# Patient Record
Sex: MALE | Race: WHITE | NOT HISPANIC OR LATINO | Employment: OTHER | URBAN - METROPOLITAN AREA
[De-identification: names, ages, dates, MRNs, and addresses within clinical notes are randomized per-mention and may not be internally consistent; named-entity substitution may affect disease eponyms.]

---

## 2017-02-09 ENCOUNTER — HOSPITAL ENCOUNTER (OUTPATIENT)
Facility: CLINIC | Age: 62
Discharge: HOME | End: 2017-02-09
Attending: INTERNAL MEDICINE | Admitting: INTERNAL MEDICINE
Payer: COMMERCIAL

## 2017-02-09 VITALS
HEIGHT: 72 IN | SYSTOLIC BLOOD PRESSURE: 120 MMHG | RESPIRATION RATE: 12 BRPM | BODY MASS INDEX: 29.12 KG/M2 | OXYGEN SATURATION: 92 % | WEIGHT: 215 LBS | DIASTOLIC BLOOD PRESSURE: 78 MMHG

## 2017-02-09 LAB — COLONOSCOPY: NORMAL

## 2017-02-09 PROCEDURE — G0500 MOD SEDAT ENDO SERVICE >5YRS: HCPCS | Performed by: INTERNAL MEDICINE

## 2017-02-09 PROCEDURE — 45378 DIAGNOSTIC COLONOSCOPY: CPT | Performed by: INTERNAL MEDICINE

## 2017-02-09 PROCEDURE — G0121 COLON CA SCRN NOT HI RSK IND: HCPCS | Performed by: INTERNAL MEDICINE

## 2017-02-09 PROCEDURE — 25000128 H RX IP 250 OP 636: Performed by: INTERNAL MEDICINE

## 2017-02-09 PROCEDURE — 25000125 ZZHC RX 250: Performed by: INTERNAL MEDICINE

## 2017-02-09 RX ORDER — FENTANYL CITRATE 50 UG/ML
INJECTION, SOLUTION INTRAMUSCULAR; INTRAVENOUS PRN
Status: DISCONTINUED | OUTPATIENT
Start: 2017-02-09 | End: 2017-02-09 | Stop reason: HOSPADM

## 2017-02-09 RX ORDER — ONDANSETRON 4 MG/1
4 TABLET, ORALLY DISINTEGRATING ORAL EVERY 6 HOURS PRN
Status: DISCONTINUED | OUTPATIENT
Start: 2017-02-09 | End: 2017-02-09 | Stop reason: HOSPADM

## 2017-02-09 RX ORDER — ONDANSETRON 2 MG/ML
4 INJECTION INTRAMUSCULAR; INTRAVENOUS
Status: DISCONTINUED | OUTPATIENT
Start: 2017-02-09 | End: 2017-02-09 | Stop reason: HOSPADM

## 2017-02-09 RX ORDER — NALOXONE HYDROCHLORIDE 0.4 MG/ML
.1-.4 INJECTION, SOLUTION INTRAMUSCULAR; INTRAVENOUS; SUBCUTANEOUS
Status: DISCONTINUED | OUTPATIENT
Start: 2017-02-09 | End: 2017-02-09 | Stop reason: HOSPADM

## 2017-02-09 RX ORDER — ONDANSETRON 2 MG/ML
4 INJECTION INTRAMUSCULAR; INTRAVENOUS EVERY 6 HOURS PRN
Status: DISCONTINUED | OUTPATIENT
Start: 2017-02-09 | End: 2017-02-09 | Stop reason: HOSPADM

## 2017-02-09 RX ORDER — FLUMAZENIL 0.1 MG/ML
0.2 INJECTION, SOLUTION INTRAVENOUS
Status: DISCONTINUED | OUTPATIENT
Start: 2017-02-09 | End: 2017-02-09 | Stop reason: HOSPADM

## 2017-02-09 RX ORDER — LIDOCAINE 40 MG/G
CREAM TOPICAL
Status: DISCONTINUED | OUTPATIENT
Start: 2017-02-09 | End: 2017-02-09 | Stop reason: HOSPADM

## 2017-02-09 NOTE — H&P
Pre-Endoscopy History and Physical     Lev Cabral MRN# [id number]   YOB: 1955 Age: 61 year old     Date of Procedure: 2/9/2017  Primary care provider: Анна Gonzáles  Type of Endoscopy: Colonoscopy with possible biopsy, possible polypectomy  Reason for Procedure: screen  Type of Anesthesia Anticipated: Conscious Sedation    HPI:    eLv is a 61 year old male who will be undergoing the above procedure.      A history and physical has been performed. The patient's medications and allergies have been reviewed. The risks and benefits of the procedure and the sedation options and risks were discussed with the patient.  All questions were answered and informed consent was obtained.      He denies a personal or family history of anesthesia complications or bleeding disorders.     Patient Active Problem List   Diagnosis     CARDIOVASCULAR SCREENING; LDL GOAL LESS THAN 130     Family history of diabetes mellitus     Advanced directives, counseling/discussion     Cervicalgia     Acute reaction to stress        Past Medical History   Diagnosis Date     Unspecified adjustment reaction         Past Surgical History   Procedure Laterality Date     Appendectomy       Ent surgery       tonsils     Orthopedic surgery  1999     neck fusion- 3 level     Orthopedic surgery Left      left knee arthroscopic     Colonoscopy  2009     Dr. Oliva       Social History   Substance Use Topics     Smoking status: Never Smoker      Smokeless tobacco: Never Used     Alcohol Use: No       Family History   Problem Relation Age of Onset     CANCER Father      brain tumor     DIABETES Father      DIABETES Mother      Type 2     Colon Cancer No family hx of        Prior to Admission medications    Medication Sig Start Date End Date Taking? Authorizing Provider   Cholecalciferol (VITAMIN D) 1000 UNITS capsule Take 2 capsules by mouth daily   Yes Reported, Patient   azithromycin (ZITHROMAX) 250 MG tablet Two tablets first  day, then one tablet daily for four days. 3/30/16   Francisca Lopez PA-C   guaiFENesin-codeine (ROBITUSSIN AC) 100-10 MG/5ML SOLN Take 10 mLs by mouth nightly as needed for cough 3/30/16   Francisca Lopez PA-C   benzonatate (TESSALON) 200 MG capsule Take 1 capsule (200 mg) by mouth 3 times daily as needed for cough 3/30/16   Francisca Lopez PA-C       Allergies   Allergen Reactions     No Known Drug Allergies         REVIEW OF SYSTEMS:   5 point ROS negative except as noted above in HPI, including Gen., Resp., CV, GI &  system review.    PHYSICAL EXAM:   Ht 1.829 m (6')  Wt 97.523 kg (215 lb)  BMI 29.15 kg/m2 Estimated body mass index is 29.15 kg/(m^2) as calculated from the following:    Height as of this encounter: 1.829 m (6').    Weight as of this encounter: 97.523 kg (215 lb).   GENERAL APPEARANCE: alert, and oriented  MENTAL STATUS: alert  AIRWAY EXAM: Mallampatti Class I (visualization of the soft palate, fauces, uvula, anterior and posterior pillars)  RESP: lungs clear to auscultation - no rales, rhonchi or wheezes  CV: regular rates and rhythm  DIAGNOSTICS:    Not indicated    IMPRESSION   ASA Class 2 - Mild systemic disease    PLAN:   Plan for Colonoscopy with possible biopsy, possible polypectomy. We discussed the risks, benefits and alternatives and the patient wished to proceed.    The above has been forwarded to the consulting provider.      Signed Electronically by: Amos Loving  February 9, 2017

## 2017-02-09 NOTE — LETTER
January 25, 2017      Lev Cabral  [address] [place] [address]              Dear Lev Cabral,    Thank you for choosing [facility]. You are scheduled for the following service(s).   Miralax Prep    Procedure:   Colonoscopy   Provider:         Dr. Loving  Date:    Thursday February 9, 2017                Arrival Time:   0800  *check in at Emergency/Endoscopy desk*  Procedure Time:  0830     Location:   [facility]      Endoscopy Department, First Floor (Enter through ER Doors) *       [address] [place], [place] [address]    [phone] or [phone] () to reschedule   What is a colonoscopy?  A colonoscopy is the most accurate test to detect colon polyps and colon cancer, and the only test where polyps can be removed. During this procedure, a doctor examines the lining of your large intestine and rectum through a flexible tube called a colonoscope.   To produce the best and most accurate results, your colon must be completely clean. You will drink a special bowel cleansing preparation to help clean out your colon. You will also need to follow a special diet several days prior to your scheduled colonoscopy.  What happens during a colonoscopy?  Plan to spend up to two hours, starting at registration time, at the endoscopy center the day of your procedure. The exam itself takes approximately 15 minutes to complete, and recovery is approximately 30 minutes.     Before the exam:    You will change into a gown.    Your medical history will be reviewed with you and you will be given a consent form to sign.     A nurse will insert an intravenous (IV) line into your hand or arm.  During the exam:     Medicine will be given through the IV line to help you relax and feel drowsy.     Your heart rate and oxygen levels will be monitored. If your blood pressure is low, you may be given fluids through the IV line.     The doctor will  insert a flexible hollow tube, called a colonoscope, into your rectum.   The scope will be advanced slowly through the large intestine (colon).    You may have a feeling of pressure or fullness.     If an abnormal tissue, or a polyp are found, the doctor may remove it through the endoscope for closer examination, or biopsy. Tissue removal is painless.  What happens after the exam?           The doctor will talk with you about the initial results of your exam.     The doctor will prepare a full report for the physician who referred you for the colonoscopy.     You may feel bloated after the procedure. This is normal.     Medication given during the exam will prohibit you from driving for the rest of the day.     Following the exam, you may resume your normal diet. Avoid alcohol until the next day.     You may resume your regular activities the day after the procedure.     A nurse will provide you with complete discharge instructions before you leave the endoscopy center. Be sure to ask the nurse for specific instructions if you take blood thinners such as aspirin, Coumadin or Plavix.     Any tissue samples removed during the exam will be sent to a lab for evaluation. It may take 5-7 working days for you to be notified of the results.     Miralax-Gatorade  If you have diabetes, ask your regular doctor for diet and medication restrictions.   If you take a medication to thin your blood, such as coumadin or warfarin, please call your primary care provider for directions on when to stop this medication.  If you take aspirin, you may continue to do so.  If you are or may be pregnant, please discuss the risks and benefits of this procedure with your doctor.  You must arrange for a ride for the day of your exam. If you fail to arrange transportation with a responsible adult (someone you know and trust) your procedure will need to be cancelled and rescheduled.  If you must cancel or reschedule your appointment, please call  [phone] as soon as possible.        PREPARATION  To ensure a successful exam, please follow all instructions carefully. Failure to accurately and completely prepare for your exam may result in the need for an additional procedure and both procedures will be billed to your insurance.     Purchase the following over-the-counter supplies at your local pharmacy:      ? 2 tablets bisacodyl, each containing 5 mg of bisacodyl (Dulcolax  laxative NOT Dulcolax  stool softener)   ? 1-8.3 oz. bottle Miralax  powder (238 grams)   ? 64 oz. Gatorade  liquid (NOT red; NOT powdered). Regular Gatorade , Gatorade G2 , Powerade  or  PoweradeZero  are acceptable.   ? 1-10 oz. bottle Magnesium Citrate (NOT red)  7 days before your exam:  Discontinue fiber supplements or medications containing iron. This includes multivitamins with iron, Metamucil  and Fibercon .    3 Days prior to your exam:  Stop eating all high-fiber foods and begin a Low-Fiber Diet. A low fiber diet helps make the cleanout more effective.     Examples of a Low Fiber Diet include:     White bread, white rice, pasta, potato without skin, plain crackers     Fish, white meat chicken, eggs, peanut butter without nuts     Clear beverages (apple juice, white grape juice, Sprite , sparkling water, Gatorade )     Cooked carrots, cooked green beans, cooked spinach        Milk, plain yogurt, cheese     Jelly, salt, pepper, sugar  Avoid: Raw fruits or vegetables, whole wheat or high fiber foods, seeds, nuts, popcorn, bran or bulking agents     2 days prior to your exam     Continue Low Fiber Diet.     Drink at least 8 (eight ounces) glasses of water throughout the day.     Refrigerate the Gatorade  or Powerade , if you wish to drink it cold.     Stop eating solid foods at 11:45 pm.    1 day prior to your exam  Start a Clear Liquid Diet   Examples of a Clear Liquid Diet:     No red liquids; No coffee; No alcohol; No dairy products     May drink clear caffeinated  beverages    Water: drink at least 8 glasses of water during the day     Tea (do not add milk or creamer)     Clear broth or bouillon     Gatorade , Pedialyte  or Powerade  (No red)     Carbonated and non-carbonated soft drinks (Sprite , 7-Up , Ginger ale)     Strained fruit juices without pulp (apple, white grape, white cranberry)     Jell-O , popsicles, hard candy (No red)    At 12 Noon:  Take the 2 bisacodyl (Dulcolax ) tablets    At 4 PM (no later than 6 PM)    Mix 1 bottle of Miralax  (8.3 oz) with 64 oz. of Gatorade  in a large pitcher.     Drink 1 - 8 oz. glass of the Miralax /Gatorade  solution.     Continue drinking 1 - 8 oz. glass every 15 minutes thereafter until the mixture is gone.     Continue clear liquid diet     Colon Cleansing Tips     If you experience nausea or vomiting while taking the prep, rinse your mouth with water,  or mouthwash , take a 15-30 minute break, and then continue taking the prep solution. If you are still unable to complete the prep, without severe nausea or vomiting, you will need to contact your primary care provider (the provider who ordered the test) for a possible anti-nausea medication. Or if you are prone to nausea you may want to ask your primary care provider to prescribe an as needed anti-nausea medication that you may have on hand.    Chill the Miralax /Gatorade  solution in the refrigerator. DO NOT add ice to the solution or your drinking glass.      Set a timer for every 15 minutes. Drink each 8 - oz. glass of solution quickly to help flush your colon.     Stay near a toilet! You will have diarrhea.     Even if you are sitting on the toilet, continue to drink the cleansing solution every 15 minutes.     Drink all of the solution until it is gone.     You will be uncomfortable until the stool has flushed from your colon (in about 2-4 hours). You may feel chilled.     You may suck on a few hard candies (NO red).     Alcohol-free baby wipes or Vaseline  may help ease  skin irritation.     Over-the-counter hydrocortisone creams, hemorrhoid treatments or Tucks may be used if desired.    The day of your exam:            Continue clear liquid diet. Do not eat solid foods.     You may take all of your morning medications.    4 hours before your procedure:     Drink 10 oz. of Magnesium Citrate.    2 hours before your procedure:     Stop drinking clear liquids.     Allow extra time to travel to your procedure as you may need to stop and use a restroom along the way.  You are ready for the exam, if you followed all instructions and your stool is no longer formed, but clear or yellow liquid. If you are unsure whether your colon is clean, please call our department at [phone] before you leave for your appointment.      Bring a list of all of your current medications, including any allergy or over-the-counter medications.      Bring a photo ID, as well as up-to-date insurance information, such as your insurance card and any referral forms that might be required by your insurance company.  DIRECTIONS  From the north ([facility], [place])  Take [address] south, exit to [address] [place] [address]. Get into the left hand channing, turn left (east), go one-half mile to [address]. Turn left (north) on [address]. Go north to first stoplight, take a right on the newly constructed [address] and follow it to the Emergency entrance.  From the south ([facility] [place] [facility])  Take 35 north to the east split, [address], and exit to [address] [place] [address]. Turn left (west) on [address] [address] to [address]. Turn right (north) on [address]. Go north to first stoplight, take a right on the newly [address] and follow it to the Emergency entrance.    From the east via 35E ([facility])  Take [address]E south to [address] [address] exit. Turn right on [address] [address]. Go west to [address]. Turn right (north) on [address], go to the first stoplight, take a right and  follow the newly constructed road, [place], to the Emergency entrance.    From the east via [address] ([facility])  Take [address] west to [address]. Turn left (south) on [address] to [address]. Turn left (east) on [address] and follow the newly constructed road to the Emergency entrance.    From the west via [address] ([place])  Take [address] east to [address]. Turn right (south) on [address] to [address].  Turn left (east) on [address] and follow the newly constructed road to the Emergency entrance.  Cut along line  -------------------------------------------------------------------------------------------------------------------  SHOPPING LIST FOR OVER-THE-COUNTER COLONOSCOPY PREP:  ? 2 tablets bisacodyl, each containing 5 mg of bisacodyl (Dulcolax  laxative                     NOT Dulcolax  stool softener)   ? 1-8.3 oz. bottle Miralax  powder (238 grams)   ? 64 oz. Gatorade  liquid (NOT red; NOT powdered). Regular Gatorade ,                     Gatorade G2 , Powerade  or  PoweradeZero  are acceptable.   ? 1-10 oz. bottle Magnesium Citrate (NOT red)

## 2017-02-09 NOTE — DISCHARGE INSTRUCTIONS
The patient has received a copy of the Provation  report the doctor has written and discharge instructions have been discussed with the patient and responsible adult.  All questions were addressed and answered prior to patient discharge.

## 2017-07-06 ENCOUNTER — RADIANT APPOINTMENT (OUTPATIENT)
Dept: GENERAL RADIOLOGY | Facility: CLINIC | Age: 62
End: 2017-07-06
Attending: PHYSICIAN ASSISTANT
Payer: COMMERCIAL

## 2017-07-06 ENCOUNTER — OFFICE VISIT (OUTPATIENT)
Dept: FAMILY MEDICINE | Facility: CLINIC | Age: 62
End: 2017-07-06
Payer: COMMERCIAL

## 2017-07-06 VITALS
TEMPERATURE: 98.1 F | BODY MASS INDEX: 28.65 KG/M2 | SYSTOLIC BLOOD PRESSURE: 126 MMHG | OXYGEN SATURATION: 94 % | DIASTOLIC BLOOD PRESSURE: 80 MMHG | WEIGHT: 211.5 LBS | HEART RATE: 68 BPM | HEIGHT: 72 IN

## 2017-07-06 DIAGNOSIS — M94.8X9: ICD-10-CM

## 2017-07-06 DIAGNOSIS — R22.2 LUMP IN CHEST: Primary | ICD-10-CM

## 2017-07-06 DIAGNOSIS — R22.2 LUMP IN CHEST: ICD-10-CM

## 2017-07-06 PROCEDURE — 99213 OFFICE O/P EST LOW 20 MIN: CPT | Performed by: PHYSICIAN ASSISTANT

## 2017-07-06 PROCEDURE — 71101 X-RAY EXAM UNILAT RIBS/CHEST: CPT | Mod: LT

## 2017-07-06 NOTE — NURSING NOTE
Chief Complaint   Patient presents with     Rib Problem       Initial /80  Pulse 68  Temp 98.1  F (36.7  C) (Oral)  Ht 6' (1.829 m)  Wt 211 lb 8 oz (95.9 kg)  SpO2 94%  BMI 28.68 kg/m2 Estimated body mass index is 28.68 kg/(m^2) as calculated from the following:    Height as of this encounter: 6' (1.829 m).    Weight as of this encounter: 211 lb 8 oz (95.9 kg).  Medication Reconciliation: complete   Chirag Teague CMA

## 2017-07-06 NOTE — MR AVS SNAPSHOT
After Visit Summary   7/6/2017    Lev Cabral    MRN: [id number]           Patient Information     Date Of Birth          1955        Visit Information        Provider Department      7/6/2017 8:00 AM Cr Corley PA-C [facility]        Today's Diagnoses     Lump in chest    -  1    Calcification of cartilage           Follow-ups after your visit        Additional Services     GENERAL SURG ADULT REFERRAL       Your provider has referred you to: [facility]: [facility] [place] [phone]   [email or web address]    Please be aware that coverage of these services is subject to the terms and limitations of your health insurance plan.  Call member services at your health plan with any benefit or coverage questions.      Please bring the following with you to your appointment:    (1) Any X-Rays, CTs or MRIs which have been performed.  Contact the facility where they were done to arrange for  prior to your scheduled appointment.   (2) List of current medications   (3) This referral request   (4) Any documents/labs given to you for this referral                  Who to contact     If you have questions or need follow up information about today's clinic visit or your schedule please contact [facility] directly at [phone].  Normal or non-critical lab and imaging results will be communicated to you by MyChart, letter or phone within 4 business days after the clinic has received the results. If you do not hear from us within 7 days, please contact the clinic through MyChart or phone. If you have a critical or abnormal lab result, we will notify you by phone as soon as possible.  Submit refill requests through [employer] or call your pharmacy and they will forward the refill request to us. Please allow 3 business days for your refill to be completed.          Additional Information About Your Visit       "  MyChart Information     MyShape lets you send messages to your doctor, view your test results, renew your prescriptions, schedule appointments and more. To sign up, go to [email or web address] . Click on \"Log in\" on the left side of the screen, which will take you to the Welcome page. Then click on \"Sign up Now\" on the right side of the page.     You will be asked to enter the access code listed below, as well as some personal information. Please follow the directions to create your username and password.     Your access code is: [email or web address]  Expires: 10/4/2017  9:03 AM     Your access code will  in 90 days. If you need help or a new code, please call your [facility] clinic or [phone].        Care EveryWhere ID     This is your Care EveryWhere ID. This could be used by other organizations to access your [facility] medical records  [id number]        Your Vitals Were     Pulse Temperature Height Pulse Oximetry BMI (Body Mass Index)       68 98.1  F (36.7  C) (Oral) 6' (1.829 m) 94% 28.68 kg/m2        Blood Pressure from Last 3 Encounters:   17 126/80   17 120/78   16 136/76    Weight from Last 3 Encounters:   17 211 lb 8 oz (95.9 kg)   17 215 lb (97.5 kg)   16 235 lb 8 oz (106.8 kg)              We Performed the Following     GENERAL SURG ADULT REFERRAL        Primary Care Provider Office Phone # Fax #    Анна Gonzáles [phone]       [facility] [address] [facility] [address]        Equal Access to Services     KATHLEEN HANSEN AH: Hadii lolita Rivera, waaiyanada arie, davonte kaalmada yuliya, rachael nunez. So [facility] [phone].    ATENCIÓN: Si habla español, tiene a henry disposición servicios gratuitos de asistencia lingüística. Llame al [phone].    We comply with applicable federal civil rights laws and [place] laws. We do not discriminate on the basis of race, color, national " origin, age, disability sex, sexual orientation or gender identity.            Thank you!     Thank you for choosing [facility]  for your care. Our goal is always to provide you with excellent care. Hearing back from our patients is one way we can continue to improve our services. Please take a few minutes to complete the written survey that you may receive in the mail after your visit with us. Thank you!             Your Updated Medication List - Protect others around you: Learn how to safely use, store and throw away your medicines at www.disposemymeds.org.          This list is accurate as of: 7/6/17  9:03 AM.  Always use your most recent med list.                   Brand Name Dispense Instructions for use Diagnosis    vitamin D 1000 UNITS capsule      Take 2 capsules by mouth daily

## 2017-07-06 NOTE — PROGRESS NOTES
"  SUBJECTIVE:                                                    Lev Cabral is a 61 year old male who presents to clinic today for the following health issues:    Musculoskeletal problem/pain      Duration: many years    Description  Location: lower left ribs    Intensity:  mild    Accompanying signs and symptoms: changed in size, thinks there might be another one     History  Previous similar problem: no   Previous evaluation:  none    Precipitating or alleviating factors:  Trauma or overuse: no   Aggravating factors include: none    Therapies tried and outcome: nothing    -Patient is a 60yo male with a hx of a \"lump\" along the lower left rib area  -He has noted it for years (est 30) but now it is increasing?  -Has lost some weight in the recent months - unsure if that is why it is looking larger?   Of note, weight loss IS intentional  -Hx of a rib fx in that area many years ago, otherwise no trauma  -the lump is not tender; is not soft - feels like \"a bone, hard as a rock\"  -it does not move    -denies fevers, chills; weight loss is intentional    Problem list and histories reviewed & adjusted, as indicated.  Additional history: as documented    Patient Active Problem List   Diagnosis     CARDIOVASCULAR SCREENING; LDL GOAL LESS THAN 130     Family history of diabetes mellitus     Advanced directives, counseling/discussion     Cervicalgia     Acute reaction to stress     Past Surgical History:   Procedure Laterality Date     APPENDECTOMY       COLONOSCOPY  2009    Dr. Oliva     COLONOSCOPY N/A 2/9/2017    Procedure: COLONOSCOPY;  Surgeon: Amos Loving MD;  Location:  GI     ENT SURGERY      tonsils     ORTHOPEDIC SURGERY  1999    neck fusion- 3 level     ORTHOPEDIC SURGERY Left     left knee arthroscopic       Social History   Substance Use Topics     Smoking status: Never Smoker     Smokeless tobacco: Never Used     Alcohol use No     Family History   Problem Relation Age of Onset     CANCER Father  "     brain tumor     DIABETES Father      DIABETES Mother      Type 2     Colon Cancer No family hx of            Reviewed and updated as needed this visit by clinical staff       Reviewed and updated as needed this visit by Provider         ROS:  Constitutional, HEENT, cardiovascular, pulmonary, gi and gu systems are negative, except as otherwise noted.    OBJECTIVE:     /80  Pulse 68  Temp 98.1  F (36.7  C) (Oral)  Ht 6' (1.829 m)  Wt 211 lb 8 oz (95.9 kg)  SpO2 94%  BMI 28.68 kg/m2  Body mass index is 28.68 kg/(m^2).  GENERAL: healthy, alert and no distress  MS/SKIN: along the left anterior, lower rib line (just lateral to sternum) is a large, non-tender, hardened lesion, about the size of a golf ball. It is rigid to touch without smooth surface    Diagnostic Test Results:  Xray - IMPRESSION: Cardiac silhouette and pulmonary vasculature are within  normal limits. No focal airspace disease, pleural effusion or  pneumothorax. Prominent costochondral calcification is noted at the  patient's palpable area of concern. No fractures are seen.    ASSESSMENT/PLAN:   1. Lump in chest  2. Calcification of cartilage  Radiographic impression as above. Likely costochondral calcification. Unclear if any relation to his past reported rib injury but doubtful. I do wonder if this has remained the same size but his weight loss has given it more subjective prominence. Patient did want to explore removal or further recommendation so will have him see general surgery.  - XR Ribs & Chest Left G/E 3 Views; Future  - GENERAL SURG ADULT REFERRAL    Cr Corley PA-C  [facility]

## 2017-07-07 ENCOUNTER — TELEPHONE (OUTPATIENT)
Dept: SURGERY | Facility: CLINIC | Age: 62
End: 2017-07-07

## 2017-07-21 ENCOUNTER — OFFICE VISIT (OUTPATIENT)
Dept: FAMILY MEDICINE | Facility: CLINIC | Age: 62
End: 2017-07-21
Payer: COMMERCIAL

## 2017-07-21 VITALS
HEIGHT: 72 IN | SYSTOLIC BLOOD PRESSURE: 110 MMHG | OXYGEN SATURATION: 96 % | DIASTOLIC BLOOD PRESSURE: 76 MMHG | WEIGHT: 209.8 LBS | BODY MASS INDEX: 28.42 KG/M2 | TEMPERATURE: 97.8 F | HEART RATE: 60 BPM

## 2017-07-21 DIAGNOSIS — Z13.6 CARDIOVASCULAR SCREENING; LDL GOAL LESS THAN 130: ICD-10-CM

## 2017-07-21 DIAGNOSIS — Z12.5 SCREENING FOR PROSTATE CANCER: ICD-10-CM

## 2017-07-21 DIAGNOSIS — H61.22 IMPACTED CERUMEN OF LEFT EAR: ICD-10-CM

## 2017-07-21 DIAGNOSIS — Z00.01 ENCOUNTER FOR ROUTINE ADULT HEALTH EXAMINATION WITH ABNORMAL FINDINGS: Primary | ICD-10-CM

## 2017-07-21 DIAGNOSIS — Z11.59 NEED FOR HEPATITIS C SCREENING TEST: ICD-10-CM

## 2017-07-21 DIAGNOSIS — N48.6 PEYRONIE'S DISEASE: ICD-10-CM

## 2017-07-21 DIAGNOSIS — Z23 NEED FOR VACCINATION: ICD-10-CM

## 2017-07-21 DIAGNOSIS — N52.9 ERECTILE DYSFUNCTION, UNSPECIFIED ERECTILE DYSFUNCTION TYPE: ICD-10-CM

## 2017-07-21 PROCEDURE — 80061 LIPID PANEL: CPT | Performed by: PHYSICIAN ASSISTANT

## 2017-07-21 PROCEDURE — 84270 ASSAY OF SEX HORMONE GLOBUL: CPT | Performed by: PHYSICIAN ASSISTANT

## 2017-07-21 PROCEDURE — 80048 BASIC METABOLIC PNL TOTAL CA: CPT | Performed by: PHYSICIAN ASSISTANT

## 2017-07-21 PROCEDURE — 99396 PREV VISIT EST AGE 40-64: CPT | Performed by: PHYSICIAN ASSISTANT

## 2017-07-21 PROCEDURE — 99213 OFFICE O/P EST LOW 20 MIN: CPT | Mod: 25 | Performed by: PHYSICIAN ASSISTANT

## 2017-07-21 PROCEDURE — 84403 ASSAY OF TOTAL TESTOSTERONE: CPT | Performed by: PHYSICIAN ASSISTANT

## 2017-07-21 PROCEDURE — 86803 HEPATITIS C AB TEST: CPT | Performed by: PHYSICIAN ASSISTANT

## 2017-07-21 PROCEDURE — 36415 COLL VENOUS BLD VENIPUNCTURE: CPT | Performed by: PHYSICIAN ASSISTANT

## 2017-07-21 RX ORDER — SILDENAFIL 100 MG/1
50-100 TABLET, FILM COATED ORAL DAILY PRN
Qty: 12 TABLET | Refills: 11 | Status: SHIPPED | OUTPATIENT
Start: 2017-07-21 | End: 2017-11-21

## 2017-07-21 NOTE — MR AVS SNAPSHOT
After Visit Summary   7/21/2017    Lev Cabral    MRN: [id number]           Patient Information     Date Of Birth          1955        Visit Information        Provider Department      7/21/2017 8:20 AM Cr Corley PA-C [facility]        Today's Diagnoses     Encounter for routine adult health examination with abnormal findings    -  1    CARDIOVASCULAR SCREENING; LDL GOAL LESS THAN 130        Erectile dysfunction, unspecified erectile dysfunction type        Peyronie's disease        Screening for prostate cancer        Need for vaccination        Need for hepatitis C screening test        Impacted cerumen of left ear          Care Instructions      Preventive Health Recommendations  Male Ages 50 - 64    Yearly exam:             See your health care provider every year in order to  o   Review health changes.   o   Discuss preventive care.    o   Review your medicines if your doctor has prescribed any.     Have a cholesterol test every 5 years, or more frequently if you are at risk for high cholesterol/heart disease.     Have a diabetes test (fasting glucose) every three years. If you are at risk for diabetes, you should have this test more often.     Have a colonoscopy at age 50, or have a yearly FIT test (stool test). These exams will check for colon cancer.      Talk with your health care provider about whether or not a prostate cancer screening test (PSA) is right for you.    You should be tested each year for STDs (sexually transmitted diseases), if you re at risk.     Shots: Get a flu shot each year. Get a tetanus shot every 10 years.     Nutrition:    Eat at least 5 servings of fruits and vegetables daily.     Eat whole-grain bread, whole-wheat pasta and brown rice instead of white grains and rice.     Talk to your provider about Calcium and Vitamin D.     Lifestyle    Exercise for at least 150 minutes a week (30 minutes a day, 5 days a week). This will help  "you control your weight and prevent disease.     Limit alcohol to one drink per day.     No smoking.     Wear sunscreen to prevent skin cancer.     See your dentist every six months for an exam and cleaning.     See your eye doctor every 1 to 2 years.            Follow-ups after your visit        Who to contact     If you have questions or need follow up information about today's clinic visit or your schedule please contact [facility] directly at [phone].  Normal or non-critical lab and imaging results will be communicated to you by Chattering Pixelshart, letter or phone within 4 business days after the clinic has received the results. If you do not hear from us within 7 days, please contact the clinic through Aeponat or phone. If you have a critical or abnormal lab result, we will notify you by phone as soon as possible.  Submit refill requests through PlayEarth or call your pharmacy and they will forward the refill request to us. Please allow 3 business days for your refill to be completed.          Additional Information About Your Visit        PlayEarth Information     PlayEarth lets you send messages to your doctor, view your test results, renew your prescriptions, schedule appointments and more. To sign up, go to [email or web address] . Click on \"Log in\" on the left side of the screen, which will take you to the Welcome page. Then click on \"Sign up Now\" on the right side of the page.     You will be asked to enter the access code listed below, as well as some personal information. Please follow the directions to create your username and password.     Your access code is: [email or web address]  Expires: 10/4/2017  9:03 AM     Your access code will  in 90 days. If you need help or a new code, please call your [place] clinic or [phone].        Care EveryWhere ID     This is your Care EveryWhere ID. This could be used by other organizations to access your [place] medical records  [id number]        Your " Vitals Were     Pulse Temperature Height Pulse Oximetry BMI (Body Mass Index)       60 97.8  F (36.6  C) (Oral) 6' (1.829 m) 96% 28.45 kg/m2        Blood Pressure from Last 3 Encounters:   07/21/17 110/76   07/06/17 126/80   02/09/17 120/78    Weight from Last 3 Encounters:   07/21/17 209 lb 12.8 oz (95.2 kg)   07/06/17 211 lb 8 oz (95.9 kg)   02/09/17 215 lb (97.5 kg)              We Performed the Following     Basic metabolic panel     Hepatitis C Screen Reflex to HCV RNA Quant and Genotype     Lipid panel reflex to direct LDL     Testosterone Free and Total          Today's Medication Changes          These changes are accurate as of: 7/21/17  8:56 AM.  If you have any questions, ask your nurse or doctor.               Start taking these medicines.        Dose/Directions    sildenafil 100 MG cap/tab   Commonly known as:  REVATIO/VIAGRA   Used for:  Peyronie's disease, Erectile dysfunction, unspecified erectile dysfunction type   Started by:  Cr Corley PA-C        Dose:   mg   Take 0.5-1 tablets ( mg) by mouth daily as needed for erectile dysfunction Take 30 min to 4 hours before intercourse.   Quantity:  12 tablet   Refills:  11            Where to get your medicines      These medications were sent to [facility] [place], [place] - [address]  [address] [facility] [address]     Phone:  [phone]     sildenafil 100 MG cap/tab                Primary Care Provider Office Phone # Fax #    Анна Gonzáles [phone]       [facility] [address]  [facility] [address]        Equal Access to Services     [facility] TYRONE AH: Hadii lolita Rivera, waaiyanada luqadaha, qaybta kaalmada adeegyada, rachael nunez. So [facility] [phone].    ATENCIÓN: Si habla español, tiene a henry disposición servicios gratuitos de asistencia lingüística. Llame al [phone].    We comply with applicable federal civil  rights laws and [place] laws. We do not discriminate on the basis of race, color, national origin, age, disability sex, sexual orientation or gender identity.            Thank you!     Thank you for choosing [facility]  for your care. Our goal is always to provide you with excellent care. Hearing back from our patients is one way we can continue to improve our services. Please take a few minutes to complete the written survey that you may receive in the mail after your visit with us. Thank you!             Your Updated Medication List - Protect others around you: Learn how to safely use, store and throw away your medicines at www.disposemymeds.org.          This list is accurate as of: 7/21/17  8:56 AM.  Always use your most recent med list.                   Brand Name Dispense Instructions for use Diagnosis    sildenafil 100 MG cap/tab    REVATIO/VIAGRA    12 tablet    Take 0.5-1 tablets ( mg) by mouth daily as needed for erectile dysfunction Take 30 min to 4 hours before intercourse.    Peyronie's disease, Erectile dysfunction, unspecified erectile dysfunction type       vitamin D 1000 UNITS capsule      Take 2 capsules by mouth daily

## 2017-07-21 NOTE — PROGRESS NOTES
SUBJECTIVE:   CC: Lev Cabral is an 61 year old male who presents for preventative health visit.     Physical   Annual:     Getting at least 3 servings of Calcium per day::  Yes    Bi-annual eye exam::  Yes    Dental care twice a year::  Yes    Sleep apnea or symptoms of sleep apnea::  None    Diet::  Regular (no restrictions)    Frequency of exercise::  6-7 days/week    Duration of exercise::  45-60 minutes    Taking medications regularly::  Not Applicable    Additional concerns today::  No        Today's PHQ-2 Score: PHQ-2 ( 1999 Pfizer) 7/21/2017   Q1: Little interest or pleasure in doing things 0   Q2: Feeling down, depressed or hopeless 0   PHQ-2 Score 0   Q1: Little interest or pleasure in doing things Not at all   Q2: Feeling down, depressed or hopeless Not at all   PHQ-2 Score 0       Abuse: Current or Past(Physical, Sexual or Emotional)- No  Do you feel safe in your environment - Yes    Social History   Substance Use Topics     Smoking status: Never Smoker     Smokeless tobacco: Never Used     Alcohol use No     The patient does not drink >3 drinks per day nor >7 drinks per week.    Last PSA:   PSA   Date Value Ref Range Status   05/01/2014 0.83 0 - 4 ug/L Final       Reviewed orders with patient. Reviewed health maintenance and updated orders accordingly - Yes  Labs reviewed in EPIC    Reviewed and updated as needed this visit by clinical staff         Reviewed and updated as needed this visit by Provider              ROS:  C: NEGATIVE for fever, chills, change in weight  I: NEGATIVE for worrisome rashes, moles or lesions  E: NEGATIVE for vision changes or irritation  ENT: NEGATIVE for ear, mouth and throat problems  R: NEGATIVE for significant cough or SOB  CV: NEGATIVE for chest pain, palpitations or peripheral edema  GI: NEGATIVE for nausea, abdominal pain, heartburn, or change in bowel habits   male: negative for dysuria, hematuria, urethral discharge; POSITIVE for change urinary stream,  erectile dysfunction  MUSCULOSKELETAL: chest mass  N: NEGATIVE for weakness, dizziness or paresthesias  P: NEGATIVE for changes in mood or affect    OBJECTIVE:   /76 (BP Location: Right arm, Cuff Size: Adult Large)  Pulse 60  Temp 97.8  F (36.6  C) (Oral)  Ht 6' (1.829 m)  Wt 209 lb 12.8 oz (95.2 kg)  SpO2 96%  BMI 28.45 kg/m2    EXAM:  GENERAL: healthy, alert and no distress  EYES: Eyes grossly normal to inspection, PERRL and conjunctivae and sclerae normal  HENT: ear canals and TM's normal, nose and mouth without ulcers or lesions  NECK: no adenopathy, no asymmetry, masses, or scars and thyroid normal to palpation  RESP: lungs clear to auscultation - no rales, rhonchi or wheezes  CV: regular rate and rhythm, normal S1 S2, no S3 or S4, no murmur, click or rub, no peripheral edema and peripheral pulses strong  ABDOMEN: soft, nontender, no hepatosplenomegaly, and bowel sounds normal; Again noted is a large, hard golf ball sized mass at the left costosternal border   (male): normal male genitalia with mild left penile curvature without lesions or urethral discharge, no hernia  RECTAL (male): patient deferred  MS: no gross musculoskeletal defects noted, no edema  SKIN: no suspicious lesions or rashes  NEURO: Normal strength and tone, mentation intact and speech normal  PSYCH: mentation appears normal, affect normal/bright    ASSESSMENT/PLAN:   1. Encounter for routine adult health examination with abnormal findings  60yo male in stable health. Updating HM and below  - Lipid panel reflex to direct LDL    2. CARDIOVASCULAR SCREENING; LDL GOAL LESS THAN 130  - Lipid panel reflex to direct LDL    3. Erectile dysfunction, unspecified erectile dysfunction type  4. Peyronie's disease  Hx of peyronies and now new onset development of ED. Likely some aspect psychological but he has an additional aspect of low testosterone. Rechecking testosterone today. Consider endocrine referral. Trial of sildenafil - side  effects and instructions reviewed.    - sildenafil (REVATIO/VIAGRA) 100 MG cap/tab; Take 0.5-1 tablets ( mg) by mouth daily as needed for erectile dysfunction Take 30 min to 4 hours before intercourse.  Dispense: 12 tablet; Refill: 11  - Testosterone Free and Total    5. Screening for prostate cancer  Asymptomatic. Reviewed today. Consider testing next year or if planning on testosterone replacement    6. Need for vaccination  Sending to pharmacy for Zoster    7. Need for hepatitis C screening test  - Basic metabolic panel  - Hepatitis C Screen Reflex to HCV RNA Quant and Genotype    8. Impacted cerumen of left ear  Cleared with flushing    COUNSELING:   Reviewed preventive health counseling, as reflected in patient instructions       Regular exercise       Healthy diet/nutrition       Colon cancer screening       Prostate cancer screening     reports that he has never smoked. He has never used smokeless tobacco.      Estimated body mass index is 28.68 kg/(m^2) as calculated from the following:    Height as of 7/6/17: 6' (1.829 m).    Weight as of 7/6/17: 211 lb 8 oz (95.9 kg).   Weight management plan: Discussed healthy diet and exercise guidelines and patient will follow up in 12 months in clinic to re-evaluate.    Counseling Resources:  ATP IV Guidelines  Pooled Cohorts Equation Calculator  FRAX Risk Assessment  ICSI Preventive Guidelines  Dietary Guidelines for Americans, 2010  USDA's MyPlate  ASA Prophylaxis  Lung CA Screening    Cr Corley PA-C  [facility]  Answers for HPI/ROS submitted by the patient on 7/21/2017   PHQ-2 Score: 0

## 2017-07-22 LAB
ANION GAP SERPL CALCULATED.3IONS-SCNC: 6 MMOL/L (ref 3–14)
BUN SERPL-MCNC: 28 MG/DL (ref 7–30)
CALCIUM SERPL-MCNC: 9.2 MG/DL (ref 8.5–10.1)
CHLORIDE SERPL-SCNC: 110 MMOL/L (ref 94–109)
CHOLEST SERPL-MCNC: 170 MG/DL
CO2 SERPL-SCNC: 24 MMOL/L (ref 20–32)
CREAT SERPL-MCNC: 1.02 MG/DL (ref 0.66–1.25)
GFR SERPL CREATININE-BSD FRML MDRD: 74 ML/MIN/1.7M2
GLUCOSE SERPL-MCNC: 112 MG/DL (ref 70–99)
HDLC SERPL-MCNC: 56 MG/DL
LDLC SERPL CALC-MCNC: 92 MG/DL
NONHDLC SERPL-MCNC: 114 MG/DL
POTASSIUM SERPL-SCNC: 4.3 MMOL/L (ref 3.4–5.3)
SODIUM SERPL-SCNC: 140 MMOL/L (ref 133–144)
TRIGL SERPL-MCNC: 112 MG/DL

## 2017-07-24 LAB — HCV AB SERPL QL IA: NORMAL

## 2017-07-25 LAB
SHBG SERPL-SCNC: 34 NMOL/L (ref 11–80)
TESTOST FREE SERPL-MCNC: 6.24 NG/DL (ref 4.7–24.4)
TESTOST SERPL-MCNC: 321 NG/DL (ref 240–950)

## 2017-07-26 ENCOUNTER — TELEPHONE (OUTPATIENT)
Dept: FAMILY MEDICINE | Facility: CLINIC | Age: 62
End: 2017-07-26

## 2017-07-26 NOTE — TELEPHONE ENCOUNTER
Patient is calling to ask about lab results.  Read lab note to him, he is not on my chart, does not have a computer at home.  He would like a copy of the results mailed to him, along  With the notes from the provider. Home address listed is verified.  Please print and mail to patient.     Ynes Dodson, KRYS  Triage Nurse

## 2017-07-26 NOTE — LETTER
"July 26, 2017      Lev Cabral  [address] [place] [address]        Dear Mr. Lev Cabral,    Total cholesterol is 170, please continue exercise and watch diet.  Triglycerides are normal at 112, this is simple sugar and fat in blood. HDL which is the \"good\" cholesterol (heart protective)  is good at 56. Increase this with more exercise.  The LDL or \"bad\" cholesterol is at 96. Given cardiac risk, would recommend statin medication. If agreeable, please let me know, will send in medication, will need labs in 3 months for recheck.  Your hepatitis C testing was negative.  Your CMP reveals normal kidney function, liver function and electrolytes. Your fasting sugar was elevated at 112, change diet and increase exercise to reduce this.    Please notify patient testosterone levels are within normal range    Francisca Lopez PA-C          "

## 2017-08-02 NOTE — TELEPHONE ENCOUNTER
Patient is calling to let us know he will try to increase his exercise and continue to watch his diet, and would like to hold off   On cholesterol medication for now. He will come back next year for a recheck. No family history of high cholesterol, just  FH of type 2 diabetes of parents later in life.   Ynes Dodson, RN  Triage Nurse

## 2017-11-01 ENCOUNTER — TELEPHONE (OUTPATIENT)
Dept: FAMILY MEDICINE | Facility: CLINIC | Age: 62
End: 2017-11-01

## 2017-11-01 NOTE — TELEPHONE ENCOUNTER
Pt calls.      Yesterday he was hunting and he shot a deer.  He was field dressing it and he fell forward.  One of the deers horns punctured the palm of his hand.  His hand is swollen and he can't close it - it is pretty stiff when it is almost closed.  He put some hydrogen peroxide and neosporin on it and a band aid.  It went in the palm of his hand about 1/2 an inch.      Advised he should be seen.  He said he is going to watch it one more day and see how it is.  Advised if it gets worse before then we do have UC open until 9:00 tonight.  Locations given.

## 2017-11-07 ENCOUNTER — TELEPHONE (OUTPATIENT)
Dept: FAMILY MEDICINE | Facility: CLINIC | Age: 62
End: 2017-11-07

## 2017-11-07 NOTE — TELEPHONE ENCOUNTER
Patient is calling to ask about the lump in his chest that he came to see [facility] for this summer.  It was felt to be a calcification on his rib cartilage, and he was referred to surgery.  He is wondering why he would need to see surgery for this? Can it just be biopsied?  Not sure if he would need to have it removed necessarily. He does feel that it is growing.  Advised that per note, due to location and size that surgery would be the best specialty   To address this, and advise if surgery would be advised, or if a biopsy would be sufficient.  He is agreeable, and will call them to schedule.     Ynes Dodson, RN  Triage Nurse

## 2017-11-15 ENCOUNTER — TELEPHONE (OUTPATIENT)
Dept: FAMILY MEDICINE | Facility: CLINIC | Age: 62
End: 2017-11-15

## 2017-11-15 NOTE — TELEPHONE ENCOUNTER
"LUCRETIAI  Pt had an appt with Surgeon 11/14/17 (surgeon's office tried to call pt to let him know appt was cancelled before he left for appt-didn't happen) this appt was cancelled per the provider (surgeon) told pt \"due to the location of the lump by the heart she was not going to touch it\" and gave pt another recommendation to [facility].  Pt is now struggling as to why he would need to see oncology.  Pt is going to contact [facility] to see when first available appt is as his wife has a hx of breast cancer. Pt wanted to inform you to know what his plans are.      Advised pt that [facility] does do thoracic type surgeries located on the chest and maybe a biopsy would take place. Pt felt more at ease.    Marla Buckley RN     "

## 2017-11-17 ENCOUNTER — HOSPITAL ENCOUNTER (OUTPATIENT)
Dept: CT IMAGING | Facility: CLINIC | Age: 62
Discharge: HOME | End: 2017-11-17
Attending: THORACIC SURGERY (CARDIOTHORACIC VASCULAR SURGERY) | Admitting: THORACIC SURGERY (CARDIOTHORACIC VASCULAR SURGERY)
Payer: COMMERCIAL

## 2017-11-17 DIAGNOSIS — M89.9 RIB LESION: ICD-10-CM

## 2017-11-17 PROCEDURE — 71250 CT THORAX DX C-: CPT

## 2017-11-20 ENCOUNTER — TRANSFERRED RECORDS (OUTPATIENT)
Dept: HEALTH INFORMATION MANAGEMENT | Facility: CLINIC | Age: 62
End: 2017-11-20

## 2017-11-21 ENCOUNTER — OFFICE VISIT (OUTPATIENT)
Dept: FAMILY MEDICINE | Facility: CLINIC | Age: 62
End: 2017-11-21
Payer: COMMERCIAL

## 2017-11-21 VITALS
HEART RATE: 62 BPM | BODY MASS INDEX: 29.19 KG/M2 | DIASTOLIC BLOOD PRESSURE: 82 MMHG | WEIGHT: 215.5 LBS | OXYGEN SATURATION: 97 % | HEIGHT: 72 IN | SYSTOLIC BLOOD PRESSURE: 116 MMHG | TEMPERATURE: 97.8 F

## 2017-11-21 DIAGNOSIS — Z01.818 PREOP GENERAL PHYSICAL EXAM: Primary | ICD-10-CM

## 2017-11-21 DIAGNOSIS — M89.9 RIB LESION: ICD-10-CM

## 2017-11-21 LAB — HGB BLD-MCNC: 16.6 G/DL (ref 13.3–17.7)

## 2017-11-21 PROCEDURE — 99214 OFFICE O/P EST MOD 30 MIN: CPT | Performed by: PHYSICIAN ASSISTANT

## 2017-11-21 PROCEDURE — 85018 HEMOGLOBIN: CPT | Performed by: PHYSICIAN ASSISTANT

## 2017-11-21 PROCEDURE — 36415 COLL VENOUS BLD VENIPUNCTURE: CPT | Performed by: PHYSICIAN ASSISTANT

## 2017-11-21 NOTE — MR AVS SNAPSHOT
After Visit Summary   11/21/2017    Lev Cabral    MRN: [id number]           Patient Information     Date Of Birth          1955        Visit Information        Provider Department      11/21/2017 3:00 PM Cr [facility]-C [facility]        Today's Diagnoses     Preop general physical exam    -  1    Rib lesion          Care Instructions      Before Your Surgery      Call your surgeon if there is any change in your health. This includes signs of a cold or flu (such as a sore throat, runny nose, cough, rash or fever).    Do not smoke, drink alcohol or take over the counter medicine (unless your surgeon or primary care doctor tells you to) for the 24 hours before and after surgery.    If you take prescribed drugs: Follow your doctor s orders about which medicines to take and which to stop until after surgery.    Eating and drinking prior to surgery: follow the instructions from your surgeon    Take a shower or bath the night before surgery. Use the soap your surgeon gave you to gently clean your skin. If you do not have soap from your surgeon, use your regular soap. Do not shave or scrub the surgery site.  Wear clean pajamas and have clean sheets on your bed.           Follow-ups after your visit        Who to contact     If you have questions or need follow up information about today's clinic visit or your schedule please contact [facility] directly at [phone].  Normal or non-critical lab and imaging results will be communicated to you by MyChart, letter or phone within 4 business days after the clinic has received the results. If you do not hear from us within 7 days, please contact the clinic through MyChart or phone. If you have a critical or abnormal lab result, we will notify you by phone as soon as possible.  Submit refill requests through [email or web address] or call your pharmacy and they will forward the refill request to us. Please allow 3 business  "days for your refill to be completed.          Additional Information About Your Visit        Yidiohart Information     Sprig Toys lets you send messages to your doctor, view your test results, renew your prescriptions, schedule appointments and more. To sign up, go to [email or web address] . Click on \"Log in\" on the left side of the screen, which will take you to the Welcome page. Then click on \"Sign up Now\" on the right side of the page.     You will be asked to enter the access code listed below, as well as some personal information. Please follow the directions to create your username and password.     Your access code is: [email or web address]  Expires: 2018  3:47 PM     Your access code will  in 90 days. If you need help or a new code, please call your [place] clinic or [phone].        Care EveryWhere ID     This is your Care EveryWhere ID. This could be used by other organizations to access your [place] medical records  [id number]        Your Vitals Were     Pulse Temperature Height Pulse Oximetry BMI (Body Mass Index)       62 97.8  F (36.6  C) (Oral) 6' (1.829 m) 97% 29.23 kg/m2        Blood Pressure from Last 3 Encounters:   17 116/82   17 110/76   17 126/80    Weight from Last 3 Encounters:   17 215 lb 8 oz (97.8 kg)   17 209 lb 12.8 oz (95.2 kg)   17 211 lb 8 oz (95.9 kg)              We Performed the Following     Hemoglobin        Primary Care Provider Office Phone # Fax #    Анна Gonzáles [phone]       [address] [address]        Equal Access to Services     KATHLEEN HANSEN : Ugo Rivera, madan sargent, davonte kaalmada yuliya, rachael nunez. So [facility] [phone].    ATENCIÓN: Si habla español, tiene a henry disposición servicios gratuitos de asistencia lingüística. Llame al [phone].    We comply with applicable federal civil rights laws and [place] laws. We do " not discriminate on the basis of race, color, national origin, age, disability, sex, sexual orientation, or gender identity.            Thank you!     Thank you for choosing [facility]  for your care. Our goal is always to provide you with excellent care. Hearing back from our patients is one way we can continue to improve our services. Please take a few minutes to complete the written survey that you may receive in the mail after your visit with us. Thank you!             Your Updated Medication List - Protect others around you: Learn how to safely use, store and throw away your medicines at www.disposemymeds.org.          This list is accurate as of: 11/21/17  3:47 PM.  Always use your most recent med list.                   Brand Name Dispense Instructions for use Diagnosis    sildenafil 100 MG tablet    VIAGRA    12 tablet    Take 0.5-1 tablets ( mg) by mouth daily as needed for erectile dysfunction Take 30 min to 4 hours before intercourse.    Peyronie's disease, Erectile dysfunction, unspecified erectile dysfunction type       vitamin D 1000 UNITS capsule      Take 2 capsules by mouth daily

## 2017-11-21 NOTE — NURSING NOTE
Chief Complaint   Patient presents with     Pre-Op Exam       Initial /82 (BP Location: Right arm, Cuff Size: Adult Large)  Pulse 62  Temp 97.8  F (36.6  C) (Oral)  Ht 6' (1.829 m)  Wt 215 lb 8 oz (97.8 kg)  SpO2 97%  BMI 29.23 kg/m2 Estimated body mass index is 29.23 kg/(m^2) as calculated from the following:    Height as of this encounter: 6' (1.829 m).    Weight as of this encounter: 215 lb 8 oz (97.8 kg).  Medication Reconciliation: complete   Chirag Teague CMA

## 2017-11-21 NOTE — PROGRESS NOTES
[facility]  [address] [facility] [address] [phone]  Dept: [phone]    PRE-OP EVALUATION:  Today's date: 2017    Lev Cabral (: 1955) presents for pre-operative evaluation assessment as requested by Dr. Sterling.  He requires evaluation and anesthesia risk assessment prior to undergoing surgery/procedure for treatment of rib lesion, left sided.  Proposed procedure: resection of rib lesion    Date of Surgery/ Procedure: unsure - within the next 2 weeks  Time of Surgery/ Procedure: unsure - as above  Hospital/Surgical Facility: [facility]  Surgeon: Faizan Sterling MD  Primary Physician: Анна Gonzáles  Type of Anesthesia Anticipated: General    Patient has a Health Care Directive or Living Will:  YES     Preop Questions 2017   1.  Do you have a history of heart attack, stroke, stent, bypass or surgery on an artery in the head, neck, heart or legs? No   2.  Do you ever have any pain or discomfort in your chest? No   3.  Do you have a history of  Heart Failure? No   4.   Are you troubled by shortness of breath when:  walking on a level surface, or up a slight hill, or at night? No   5.  Do you currently have a cold, bronchitis or other respiratory infection? No   6.  Do you have a cough, shortness of breath, or wheezing? No   7.  Do you sometimes get pains in the calves of your legs when you walk? No   8. Do you or anyone in your family have previous history of blood clots? No   9.  Do you or does anyone in your family have a serious bleeding problem such as prolonged bleeding following surgeries or cuts? No   10. Have you ever had problems with anemia or been told to take iron pills? No   11. Have you had any abnormal blood loss such as black, tarry or bloody stools? No   12. Have you ever had a blood transfusion? No   13. Have you or any of your relatives ever had problems with anesthesia? No   14. Do you have sleep apnea,  excessive snoring or daytime drowsiness? No   15. Do you have any prosthetic heart valves? No   16. Do you have prosthetic joints? No           HPI:                                                    Brief HPI related to upcoming procedure: Patient presents for pre-op eval. He was seen in July with a newly noted mass to the low left rib line. Will now be pursuing resection of this mass. It may have grown some since initially seen.    See problem list for active medical problems.  Problems all longstanding and stable, except as noted/documented.  See ROS for pertinent symptoms related to these conditions.                                                                                                  .    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       Acute reaction to stress 08/03/2015     Priority: Medium     EAP through spouse employee for evaluation of stressors.       CARDIOVASCULAR SCREENING; LDL GOAL LESS THAN 130 05/01/2014     Priority: Medium     Family history of diabetes mellitus 05/01/2014     Priority: Medium     Advanced directives, counseling/discussion 05/01/2014     Priority: Medium     Advance Care Planning:   ACP Review and Resources Provided:  Reviewed chart for advance care plan.  Lev Cabral has no plan or code status on file. Discussed available resources and provided with information. Confirmed code status reflects current choices pending further ACP discussions.  Confirmed/documented designated decision maker(s). See permanent comments section of demographics in clinical tab.   Added by Corine Hampton on 5/1/2014              Past Medical History:    Diagnosis Date     Unspecified adjustment reaction      Past Surgical History:   Procedure Laterality Date     APPENDECTOMY       COLONOSCOPY  2009    Dr. Oliva     COLONOSCOPY N/A 2/9/2017    Procedure: COLONOSCOPY;  Surgeon: Amos Loving MD;  Location:  GI     ENT SURGERY      tonsils     ORTHOPEDIC SURGERY  1999    neck fusion- 3 level     ORTHOPEDIC SURGERY Left     left knee arthroscopic     Current Outpatient Prescriptions   Medication Sig Dispense Refill     Cholecalciferol (VITAMIN D) 1000 UNITS capsule Take 2 capsules by mouth daily       sildenafil (REVATIO/VIAGRA) 100 MG cap/tab Take 0.5-1 tablets ( mg) by mouth daily as needed for erectile dysfunction Take 30 min to 4 hours before intercourse. (Patient not taking: Reported on 11/21/2017) 12 tablet 11     OTC products: None, except as noted above    Allergies   Allergen Reactions     No Known Drug Allergies       Latex Allergy: NO    Social History   Substance Use Topics     Smoking status: Never Smoker     Smokeless tobacco: Never Used     Alcohol use No     History   Drug Use No       REVIEW OF SYSTEMS:                                                    C: NEGATIVE for fever, chills, change in weight  E: NEGATIVE for vision changes or irritation  E/M: NEGATIVE for ear, mouth and throat problems  R: NEGATIVE for significant cough or SOB  CV: NEGATIVE for chest pain, palpitations or peripheral edema  GI: NEGATIVE for nausea, abdominal pain, heartburn, or change in bowel habits  : NEGATIVE for frequency, dysuria, or hematuria  M: NEGATIVE for significant arthralgias or myalgia  N: NEGATIVE for weakness, dizziness or paresthesias  E: NEGATIVE for temperature intolerance, skin/hair changes  PSYCHIATRIC: POSITIVE for anxiety about dx and upcoming procedure    EXAM:                                                    /82 (BP Location: Right arm, Cuff Size: Adult Large)  Pulse 62  Temp 97.8  F (36.6  C) (Oral)  Ht 6' (1.829 m)  Wt 215  lb 8 oz (97.8 kg)  SpO2 97%  BMI 29.23 kg/m2    GENERAL APPEARANCE: healthy, alert and no distress     EYES: EOMI,  PERRL     HENT: ear canals and TM's normal and nose and mouth without ulcers or lesions     NECK: no adenopathy, no asymmetry, masses, or scars and thyroid normal to palpation     RESP: lungs clear to auscultation - no rales, rhonchi or wheezes     CV: regular rates and rhythm, normal S1 S2, no S3 or S4 and no murmur, click or rub     ABDOMEN:  soft, nontender, no HSM or masses and bowel sounds normal     MS: no peripheral edema     SKIN: large hardened, immobile lesion to the left lower anterior rib line     NEURO: Normal strength and tone, sensory exam grossly normal, mentation intact and speech normal     PSYCH: open, honest and with anxiety about upcoming procedure    DIAGNOSTICS:                                                    Hemoglobin:16.6    Recent Labs   Lab Test  07/21/17   0911  08/03/15   1648   05/01/14   0923   HGB   --    --    --   16.8   NA  140  139   < >  143   POTASSIUM  4.3  3.8   < >  5.7*   CR  1.02  0.99   < >  1.07   A1C   --   5.9   --   6.0    < > = values in this interval not displayed.        IMPRESSION:                                                    Reason for surgery/procedure: Rib mass  Diagnosis/reason for consult: Pre-operative exam    The proposed surgical procedure is considered INTERMEDIATE risk.    REVISED CARDIAC RISK INDEX  The patient has the following serious cardiovascular risks for perioperative complications such as (MI, PE, VFib and 3  AV Block):  No serious cardiac risks  INTERPRETATION: 0 risks: Class I (very low risk - 0.4% complication rate)    The patient has the following additional risks for perioperative complications:  No identified additional risks      ICD-10-CM    1. Preop general physical exam Z01.818    2. Rib lesion M89.9 Hemoglobin       RECOMMENDATIONS:                                                      APPROVAL GIVEN to proceed  with proposed procedure, without further diagnostic evaluation       Signed Electronically by: Cr Corley PA-C    Copy of this evaluation report is provided to requesting physician.    [place] Preop Guidelines

## 2017-11-29 NOTE — H&P (VIEW-ONLY)
[facility]  [address] [facility] [address] [phone]  Dept: [phone]    PRE-OP EVALUATION:  Today's date: 2017    Lev Cabral (: 1955) presents for pre-operative evaluation assessment as requested by Dr. Sterling.  He requires evaluation and anesthesia risk assessment prior to undergoing surgery/procedure for treatment of rib lesion, left sided.  Proposed procedure: resection of rib lesion    Date of Surgery/ Procedure: unsure - within the next 2 weeks  Time of Surgery/ Procedure: unsure - as above  Hospital/Surgical Facility: [facility]  Surgeon: Faizan Sterling MD  Primary Physician: Анна Gonzáles  Type of Anesthesia Anticipated: General    Patient has a Health Care Directive or Living Will:  YES     Preop Questions 2017   1.  Do you have a history of heart attack, stroke, stent, bypass or surgery on an artery in the head, neck, heart or legs? No   2.  Do you ever have any pain or discomfort in your chest? No   3.  Do you have a history of  Heart Failure? No   4.   Are you troubled by shortness of breath when:  walking on a level surface, or up a slight hill, or at night? No   5.  Do you currently have a cold, bronchitis or other respiratory infection? No   6.  Do you have a cough, shortness of breath, or wheezing? No   7.  Do you sometimes get pains in the calves of your legs when you walk? No   8. Do you or anyone in your family have previous history of blood clots? No   9.  Do you or does anyone in your family have a serious bleeding problem such as prolonged bleeding following surgeries or cuts? No   10. Have you ever had problems with anemia or been told to take iron pills? No   11. Have you had any abnormal blood loss such as black, tarry or bloody stools? No   12. Have you ever had a blood transfusion? No   13. Have you or any of your relatives ever had problems with anesthesia? No   14. Do you have sleep apnea,  excessive snoring or daytime drowsiness? No   15. Do you have any prosthetic heart valves? No   16. Do you have prosthetic joints? No           HPI:                                                    Brief HPI related to upcoming procedure: Patient presents for pre-op eval. He was seen in July with a newly noted mass to the low left rib line. Will now be pursuing resection of this mass. It may have grown some since initially seen.    See problem list for active medical problems.  Problems all longstanding and stable, except as noted/documented.  See ROS for pertinent symptoms related to these conditions.                                                                                                  .    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       Acute reaction to stress 08/03/2015     Priority: Medium     EAP through spouse employee for evaluation of stressors.       CARDIOVASCULAR SCREENING; LDL GOAL LESS THAN 130 05/01/2014     Priority: Medium     Family history of diabetes mellitus 05/01/2014     Priority: Medium     Advanced directives, counseling/discussion 05/01/2014     Priority: Medium     Advance Care Planning:   ACP Review and Resources Provided:  Reviewed chart for advance care plan.  Lev Cabral has no plan or code status on file. Discussed available resources and provided with information. Confirmed code status reflects current choices pending further ACP discussions.  Confirmed/documented designated decision maker(s). See permanent comments section of demographics in clinical tab.   Added by Corine Hampton on 5/1/2014              Past Medical History:    Diagnosis Date     Unspecified adjustment reaction      Past Surgical History:   Procedure Laterality Date     APPENDECTOMY       COLONOSCOPY  2009    Dr. Oliva     COLONOSCOPY N/A 2/9/2017    Procedure: COLONOSCOPY;  Surgeon: Amos Loving MD;  Location:  GI     ENT SURGERY      tonsils     ORTHOPEDIC SURGERY  1999    neck fusion- 3 level     ORTHOPEDIC SURGERY Left     left knee arthroscopic     Current Outpatient Prescriptions   Medication Sig Dispense Refill     Cholecalciferol (VITAMIN D) 1000 UNITS capsule Take 2 capsules by mouth daily       sildenafil (REVATIO/VIAGRA) 100 MG cap/tab Take 0.5-1 tablets ( mg) by mouth daily as needed for erectile dysfunction Take 30 min to 4 hours before intercourse. (Patient not taking: Reported on 11/21/2017) 12 tablet 11     OTC products: None, except as noted above    Allergies   Allergen Reactions     No Known Drug Allergies       Latex Allergy: NO    Social History   Substance Use Topics     Smoking status: Never Smoker     Smokeless tobacco: Never Used     Alcohol use No     History   Drug Use No       REVIEW OF SYSTEMS:                                                    C: NEGATIVE for fever, chills, change in weight  E: NEGATIVE for vision changes or irritation  E/M: NEGATIVE for ear, mouth and throat problems  R: NEGATIVE for significant cough or SOB  CV: NEGATIVE for chest pain, palpitations or peripheral edema  GI: NEGATIVE for nausea, abdominal pain, heartburn, or change in bowel habits  : NEGATIVE for frequency, dysuria, or hematuria  M: NEGATIVE for significant arthralgias or myalgia  N: NEGATIVE for weakness, dizziness or paresthesias  E: NEGATIVE for temperature intolerance, skin/hair changes  PSYCHIATRIC: POSITIVE for anxiety about dx and upcoming procedure    EXAM:                                                    /82 (BP Location: Right arm, Cuff Size: Adult Large)  Pulse 62  Temp 97.8  F (36.6  C) (Oral)  Ht 6' (1.829 m)  Wt 215  lb 8 oz (97.8 kg)  SpO2 97%  BMI 29.23 kg/m2    GENERAL APPEARANCE: healthy, alert and no distress     EYES: EOMI,  PERRL     HENT: ear canals and TM's normal and nose and mouth without ulcers or lesions     NECK: no adenopathy, no asymmetry, masses, or scars and thyroid normal to palpation     RESP: lungs clear to auscultation - no rales, rhonchi or wheezes     CV: regular rates and rhythm, normal S1 S2, no S3 or S4 and no murmur, click or rub     ABDOMEN:  soft, nontender, no HSM or masses and bowel sounds normal     MS: no peripheral edema     SKIN: large hardened, immobile lesion to the left lower anterior rib line     NEURO: Normal strength and tone, sensory exam grossly normal, mentation intact and speech normal     PSYCH: open, honest and with anxiety about upcoming procedure    DIAGNOSTICS:                                                    Hemoglobin:16.6    Recent Labs   Lab Test  07/21/17   0911  08/03/15   1648   05/01/14   0923   HGB   --    --    --   16.8   NA  140  139   < >  143   POTASSIUM  4.3  3.8   < >  5.7*   CR  1.02  0.99   < >  1.07   A1C   --   5.9   --   6.0    < > = values in this interval not displayed.        IMPRESSION:                                                    Reason for surgery/procedure: Rib mass  Diagnosis/reason for consult: Pre-operative exam    The proposed surgical procedure is considered INTERMEDIATE risk.    REVISED CARDIAC RISK INDEX  The patient has the following serious cardiovascular risks for perioperative complications such as (MI, PE, VFib and 3  AV Block):  No serious cardiac risks  INTERPRETATION: 0 risks: Class I (very low risk - 0.4% complication rate)    The patient has the following additional risks for perioperative complications:  No identified additional risks      ICD-10-CM    1. Preop general physical exam Z01.818    2. Rib lesion M89.9 Hemoglobin       RECOMMENDATIONS:                                                      APPROVAL GIVEN to proceed  with proposed procedure, without further diagnostic evaluation       Signed Electronically by: Cr Corley PA-C    Copy of this evaluation report is provided to requesting physician.    [facility] Preop Guidelines

## 2017-12-01 ENCOUNTER — HOSPITAL ENCOUNTER (INPATIENT)
Facility: CLINIC | Age: 62
LOS: 2 days | Discharge: HOME | DRG: 497 | End: 2017-12-03
Attending: THORACIC SURGERY (CARDIOTHORACIC VASCULAR SURGERY) | Admitting: THORACIC SURGERY (CARDIOTHORACIC VASCULAR SURGERY)
Payer: COMMERCIAL

## 2017-12-01 ENCOUNTER — ANESTHESIA (OUTPATIENT)
Dept: SURGERY | Facility: CLINIC | Age: 62
DRG: 497 | End: 2017-12-01
Payer: COMMERCIAL

## 2017-12-01 ENCOUNTER — APPOINTMENT (OUTPATIENT)
Dept: GENERAL RADIOLOGY | Facility: CLINIC | Age: 62
DRG: 497 | End: 2017-12-01
Attending: THORACIC SURGERY (CARDIOTHORACIC VASCULAR SURGERY)
Payer: COMMERCIAL

## 2017-12-01 ENCOUNTER — ANESTHESIA EVENT (OUTPATIENT)
Dept: SURGERY | Facility: CLINIC | Age: 62
DRG: 497 | End: 2017-12-01
Payer: COMMERCIAL

## 2017-12-01 DIAGNOSIS — R22.2 MASS OF CHEST WALL, LEFT: Primary | ICD-10-CM

## 2017-12-01 LAB
ABO + RH BLD: NORMAL
ABO + RH BLD: NORMAL
ANION GAP SERPL CALCULATED.3IONS-SCNC: 5 MMOL/L (ref 3–14)
BLD GP AB SCN SERPL QL: NORMAL
BLOOD BANK CMNT PATIENT-IMP: NORMAL
BUN SERPL-MCNC: 12 MG/DL (ref 7–30)
CALCIUM SERPL-MCNC: 8.9 MG/DL (ref 8.5–10.1)
CHLORIDE SERPL-SCNC: 107 MMOL/L (ref 94–109)
CO2 SERPL-SCNC: 28 MMOL/L (ref 20–32)
CREAT SERPL-MCNC: 1.04 MG/DL (ref 0.66–1.25)
ERYTHROCYTE [DISTWIDTH] IN BLOOD BY AUTOMATED COUNT: 13.2 % (ref 10–15)
GFR SERPL CREATININE-BSD FRML MDRD: 72 ML/MIN/1.7M2
GLUCOSE SERPL-MCNC: 112 MG/DL (ref 70–99)
HCT VFR BLD AUTO: 47.6 % (ref 40–53)
HGB BLD-MCNC: 16.3 G/DL (ref 13.3–17.7)
INR PPP: 1.1 (ref 0.86–1.14)
MCH RBC QN AUTO: 30.8 PG (ref 26.5–33)
MCHC RBC AUTO-ENTMCNC: 34.2 G/DL (ref 31.5–36.5)
MCV RBC AUTO: 90 FL (ref 78–100)
PLATELET # BLD AUTO: 189 10E9/L (ref 150–450)
POTASSIUM SERPL-SCNC: 4.8 MMOL/L (ref 3.4–5.3)
RBC # BLD AUTO: 5.3 10E12/L (ref 4.4–5.9)
SODIUM SERPL-SCNC: 140 MMOL/L (ref 133–144)
SPECIMEN EXP DATE BLD: NORMAL
WBC # BLD AUTO: 6.5 10E9/L (ref 4–11)

## 2017-12-01 PROCEDURE — 86901 BLOOD TYPING SEROLOGIC RH(D): CPT | Performed by: THORACIC SURGERY (CARDIOTHORACIC VASCULAR SURGERY)

## 2017-12-01 PROCEDURE — 25000132 ZZH RX MED GY IP 250 OP 250 PS 637: Performed by: PHYSICIAN ASSISTANT

## 2017-12-01 PROCEDURE — 88305 TISSUE EXAM BY PATHOLOGIST: CPT | Performed by: THORACIC SURGERY (CARDIOTHORACIC VASCULAR SURGERY)

## 2017-12-01 PROCEDURE — 27210794 ZZH OR GENERAL SUPPLY STERILE: Performed by: THORACIC SURGERY (CARDIOTHORACIC VASCULAR SURGERY)

## 2017-12-01 PROCEDURE — 25000125 ZZHC RX 250: Performed by: PHYSICIAN ASSISTANT

## 2017-12-01 PROCEDURE — 36000063 ZZH SURGERY LEVEL 4 EA 15 ADDTL MIN: Performed by: THORACIC SURGERY (CARDIOTHORACIC VASCULAR SURGERY)

## 2017-12-01 PROCEDURE — 25000128 H RX IP 250 OP 636: Performed by: NURSE ANESTHETIST, CERTIFIED REGISTERED

## 2017-12-01 PROCEDURE — S0020 INJECTION, BUPIVICAINE HYDRO: HCPCS | Performed by: THORACIC SURGERY (CARDIOTHORACIC VASCULAR SURGERY)

## 2017-12-01 PROCEDURE — C1768 GRAFT, VASCULAR: HCPCS | Performed by: THORACIC SURGERY (CARDIOTHORACIC VASCULAR SURGERY)

## 2017-12-01 PROCEDURE — 37000009 ZZH ANESTHESIA TECHNICAL FEE, EACH ADDTL 15 MIN: Performed by: THORACIC SURGERY (CARDIOTHORACIC VASCULAR SURGERY)

## 2017-12-01 PROCEDURE — 36000093 ZZH SURGERY LEVEL 4 1ST 30 MIN: Performed by: THORACIC SURGERY (CARDIOTHORACIC VASCULAR SURGERY)

## 2017-12-01 PROCEDURE — 37000008 ZZH ANESTHESIA TECHNICAL FEE, 1ST 30 MIN: Performed by: THORACIC SURGERY (CARDIOTHORACIC VASCULAR SURGERY)

## 2017-12-01 PROCEDURE — 88311 DECALCIFY TISSUE: CPT | Performed by: THORACIC SURGERY (CARDIOTHORACIC VASCULAR SURGERY)

## 2017-12-01 PROCEDURE — 12000007 ZZH R&B INTERMEDIATE

## 2017-12-01 PROCEDURE — 40000986 XR CHEST PORT 1 VW

## 2017-12-01 PROCEDURE — S0028 INJECTION, FAMOTIDINE, 20 MG: HCPCS | Performed by: PHYSICIAN ASSISTANT

## 2017-12-01 PROCEDURE — 40000169 ZZH STATISTIC PRE-PROCEDURE ASSESSMENT I: Performed by: THORACIC SURGERY (CARDIOTHORACIC VASCULAR SURGERY)

## 2017-12-01 PROCEDURE — 88311 DECALCIFY TISSUE: CPT | Mod: 26 | Performed by: THORACIC SURGERY (CARDIOTHORACIC VASCULAR SURGERY)

## 2017-12-01 PROCEDURE — 0WU80JZ SUPPLEMENT CHEST WALL WITH SYNTHETIC SUBSTITUTE, OPEN APPROACH: ICD-10-PCS | Performed by: THORACIC SURGERY (CARDIOTHORACIC VASCULAR SURGERY)

## 2017-12-01 PROCEDURE — 25000566 ZZH SEVOFLURANE, EA 15 MIN: Performed by: THORACIC SURGERY (CARDIOTHORACIC VASCULAR SURGERY)

## 2017-12-01 PROCEDURE — 86850 RBC ANTIBODY SCREEN: CPT | Performed by: THORACIC SURGERY (CARDIOTHORACIC VASCULAR SURGERY)

## 2017-12-01 PROCEDURE — 85610 PROTHROMBIN TIME: CPT | Performed by: THORACIC SURGERY (CARDIOTHORACIC VASCULAR SURGERY)

## 2017-12-01 PROCEDURE — 80048 BASIC METABOLIC PNL TOTAL CA: CPT | Performed by: THORACIC SURGERY (CARDIOTHORACIC VASCULAR SURGERY)

## 2017-12-01 PROCEDURE — 36415 COLL VENOUS BLD VENIPUNCTURE: CPT | Performed by: THORACIC SURGERY (CARDIOTHORACIC VASCULAR SURGERY)

## 2017-12-01 PROCEDURE — 71000013 ZZH RECOVERY PHASE 1 LEVEL 1 EA ADDTL HR: Performed by: THORACIC SURGERY (CARDIOTHORACIC VASCULAR SURGERY)

## 2017-12-01 PROCEDURE — 25000125 ZZHC RX 250: Performed by: THORACIC SURGERY (CARDIOTHORACIC VASCULAR SURGERY)

## 2017-12-01 PROCEDURE — 25000125 ZZHC RX 250: Performed by: NURSE ANESTHETIST, CERTIFIED REGISTERED

## 2017-12-01 PROCEDURE — 85027 COMPLETE CBC AUTOMATED: CPT | Performed by: THORACIC SURGERY (CARDIOTHORACIC VASCULAR SURGERY)

## 2017-12-01 PROCEDURE — 25000128 H RX IP 250 OP 636: Performed by: THORACIC SURGERY (CARDIOTHORACIC VASCULAR SURGERY)

## 2017-12-01 PROCEDURE — 25000128 H RX IP 250 OP 636: Performed by: ANESTHESIOLOGY

## 2017-12-01 PROCEDURE — 88305 TISSUE EXAM BY PATHOLOGIST: CPT | Mod: 26 | Performed by: THORACIC SURGERY (CARDIOTHORACIC VASCULAR SURGERY)

## 2017-12-01 PROCEDURE — 86900 BLOOD TYPING SEROLOGIC ABO: CPT | Performed by: THORACIC SURGERY (CARDIOTHORACIC VASCULAR SURGERY)

## 2017-12-01 PROCEDURE — 0WB80ZZ EXCISION OF CHEST WALL, OPEN APPROACH: ICD-10-PCS | Performed by: THORACIC SURGERY (CARDIOTHORACIC VASCULAR SURGERY)

## 2017-12-01 PROCEDURE — 0BBT0ZZ EXCISION OF DIAPHRAGM, OPEN APPROACH: ICD-10-PCS | Performed by: THORACIC SURGERY (CARDIOTHORACIC VASCULAR SURGERY)

## 2017-12-01 PROCEDURE — 27210995 ZZH RX 272: Performed by: THORACIC SURGERY (CARDIOTHORACIC VASCULAR SURGERY)

## 2017-12-01 PROCEDURE — 25000128 H RX IP 250 OP 636: Performed by: PHYSICIAN ASSISTANT

## 2017-12-01 PROCEDURE — 71000012 ZZH RECOVERY PHASE 1 LEVEL 1 FIRST HR: Performed by: THORACIC SURGERY (CARDIOTHORACIC VASCULAR SURGERY)

## 2017-12-01 DEVICE — IMPLANTABLE DEVICE: Type: IMPLANTABLE DEVICE | Site: CHEST  WALL | Status: FUNCTIONAL

## 2017-12-01 RX ORDER — LIDOCAINE 40 MG/G
CREAM TOPICAL
Status: DISCONTINUED | OUTPATIENT
Start: 2017-12-01 | End: 2017-12-03 | Stop reason: HOSPADM

## 2017-12-01 RX ORDER — AMOXICILLIN 250 MG
2 CAPSULE ORAL 2 TIMES DAILY PRN
Status: DISCONTINUED | OUTPATIENT
Start: 2017-12-01 | End: 2017-12-01

## 2017-12-01 RX ORDER — SODIUM CHLORIDE 9 MG/ML
INJECTION, SOLUTION INTRAVENOUS CONTINUOUS
Status: DISCONTINUED | OUTPATIENT
Start: 2017-12-01 | End: 2017-12-02

## 2017-12-01 RX ORDER — KETOROLAC TROMETHAMINE 30 MG/ML
30 INJECTION, SOLUTION INTRAMUSCULAR; INTRAVENOUS EVERY 6 HOURS
Status: DISPENSED | OUTPATIENT
Start: 2017-12-01 | End: 2017-12-02

## 2017-12-01 RX ORDER — DIPHENHYDRAMINE HCL 25 MG
25 CAPSULE ORAL EVERY 6 HOURS PRN
Status: DISCONTINUED | OUTPATIENT
Start: 2017-12-01 | End: 2017-12-03 | Stop reason: HOSPADM

## 2017-12-01 RX ORDER — SODIUM CHLORIDE, SODIUM LACTATE, POTASSIUM CHLORIDE, CALCIUM CHLORIDE 600; 310; 30; 20 MG/100ML; MG/100ML; MG/100ML; MG/100ML
INJECTION, SOLUTION INTRAVENOUS CONTINUOUS
Status: DISCONTINUED | OUTPATIENT
Start: 2017-12-01 | End: 2017-12-01 | Stop reason: HOSPADM

## 2017-12-01 RX ORDER — PROPOFOL 10 MG/ML
INJECTION, EMULSION INTRAVENOUS PRN
Status: DISCONTINUED | OUTPATIENT
Start: 2017-12-01 | End: 2017-12-01

## 2017-12-01 RX ORDER — ONDANSETRON 2 MG/ML
INJECTION INTRAMUSCULAR; INTRAVENOUS PRN
Status: DISCONTINUED | OUTPATIENT
Start: 2017-12-01 | End: 2017-12-01

## 2017-12-01 RX ORDER — ONDANSETRON 4 MG/1
4 TABLET, ORALLY DISINTEGRATING ORAL EVERY 30 MIN PRN
Status: DISCONTINUED | OUTPATIENT
Start: 2017-12-01 | End: 2017-12-01 | Stop reason: HOSPADM

## 2017-12-01 RX ORDER — IBUPROFEN 600 MG/1
600 TABLET, FILM COATED ORAL 4 TIMES DAILY
Status: DISCONTINUED | OUTPATIENT
Start: 2017-12-02 | End: 2017-12-03 | Stop reason: HOSPADM

## 2017-12-01 RX ORDER — LIDOCAINE HYDROCHLORIDE 20 MG/ML
INJECTION, SOLUTION INFILTRATION; PERINEURAL PRN
Status: DISCONTINUED | OUTPATIENT
Start: 2017-12-01 | End: 2017-12-01

## 2017-12-01 RX ORDER — PROCHLORPERAZINE MALEATE 5 MG
10 TABLET ORAL EVERY 6 HOURS PRN
Status: DISCONTINUED | OUTPATIENT
Start: 2017-12-01 | End: 2017-12-03 | Stop reason: HOSPADM

## 2017-12-01 RX ORDER — AMOXICILLIN 250 MG
1 CAPSULE ORAL 2 TIMES DAILY PRN
Status: DISCONTINUED | OUTPATIENT
Start: 2017-12-01 | End: 2017-12-01

## 2017-12-01 RX ORDER — BUPIVACAINE HYDROCHLORIDE 5 MG/ML
INJECTION, SOLUTION PERINEURAL PRN
Status: DISCONTINUED | OUTPATIENT
Start: 2017-12-01 | End: 2017-12-01 | Stop reason: HOSPADM

## 2017-12-01 RX ORDER — CEFAZOLIN SODIUM 2 G/100ML
2 INJECTION, SOLUTION INTRAVENOUS
Status: COMPLETED | OUTPATIENT
Start: 2017-12-01 | End: 2017-12-01

## 2017-12-01 RX ORDER — HYDROMORPHONE HYDROCHLORIDE 2 MG/1
2-4 TABLET ORAL
Status: DISCONTINUED | OUTPATIENT
Start: 2017-12-01 | End: 2017-12-03 | Stop reason: HOSPADM

## 2017-12-01 RX ORDER — GINSENG 100 MG
CAPSULE ORAL 3 TIMES DAILY
Status: DISCONTINUED | OUTPATIENT
Start: 2017-12-01 | End: 2017-12-03 | Stop reason: HOSPADM

## 2017-12-01 RX ORDER — ACETAMINOPHEN 325 MG/1
650 TABLET ORAL EVERY 4 HOURS PRN
Status: DISCONTINUED | OUTPATIENT
Start: 2017-12-04 | End: 2017-12-03 | Stop reason: HOSPADM

## 2017-12-01 RX ORDER — AMOXICILLIN 250 MG
2 CAPSULE ORAL 2 TIMES DAILY PRN
Status: DISCONTINUED | OUTPATIENT
Start: 2017-12-01 | End: 2017-12-03 | Stop reason: HOSPADM

## 2017-12-01 RX ORDER — MAGNESIUM HYDROXIDE 1200 MG/15ML
LIQUID ORAL PRN
Status: DISCONTINUED | OUTPATIENT
Start: 2017-12-01 | End: 2017-12-01 | Stop reason: HOSPADM

## 2017-12-01 RX ORDER — BISACODYL 10 MG
10 SUPPOSITORY, RECTAL RECTAL DAILY PRN
Status: DISCONTINUED | OUTPATIENT
Start: 2017-12-01 | End: 2017-12-03 | Stop reason: HOSPADM

## 2017-12-01 RX ORDER — NEOSTIGMINE METHYLSULFATE 1 MG/ML
VIAL (ML) INJECTION PRN
Status: DISCONTINUED | OUTPATIENT
Start: 2017-12-01 | End: 2017-12-01

## 2017-12-01 RX ORDER — DEXAMETHASONE SODIUM PHOSPHATE 4 MG/ML
INJECTION, SOLUTION INTRA-ARTICULAR; INTRALESIONAL; INTRAMUSCULAR; INTRAVENOUS; SOFT TISSUE PRN
Status: DISCONTINUED | OUTPATIENT
Start: 2017-12-01 | End: 2017-12-01

## 2017-12-01 RX ORDER — ACETAMINOPHEN 325 MG/1
975 TABLET ORAL EVERY 8 HOURS
Status: DISCONTINUED | OUTPATIENT
Start: 2017-12-01 | End: 2017-12-03 | Stop reason: HOSPADM

## 2017-12-01 RX ORDER — NALOXONE HYDROCHLORIDE 0.4 MG/ML
.1-.4 INJECTION, SOLUTION INTRAMUSCULAR; INTRAVENOUS; SUBCUTANEOUS
Status: DISCONTINUED | OUTPATIENT
Start: 2017-12-01 | End: 2017-12-03 | Stop reason: HOSPADM

## 2017-12-01 RX ORDER — CALCIUM CARBONATE 500 MG/1
1000 TABLET, CHEWABLE ORAL 4 TIMES DAILY PRN
Status: DISCONTINUED | OUTPATIENT
Start: 2017-12-01 | End: 2017-12-03 | Stop reason: HOSPADM

## 2017-12-01 RX ORDER — HYDROMORPHONE HYDROCHLORIDE 1 MG/ML
.3-.5 INJECTION, SOLUTION INTRAMUSCULAR; INTRAVENOUS; SUBCUTANEOUS EVERY 5 MIN PRN
Status: DISCONTINUED | OUTPATIENT
Start: 2017-12-01 | End: 2017-12-01 | Stop reason: HOSPADM

## 2017-12-01 RX ORDER — EPHEDRINE SULFATE 50 MG/ML
INJECTION, SOLUTION INTRAMUSCULAR; INTRAVENOUS; SUBCUTANEOUS PRN
Status: DISCONTINUED | OUTPATIENT
Start: 2017-12-01 | End: 2017-12-01

## 2017-12-01 RX ORDER — FENTANYL CITRATE 0.05 MG/ML
25-50 INJECTION, SOLUTION INTRAMUSCULAR; INTRAVENOUS
Status: DISCONTINUED | OUTPATIENT
Start: 2017-12-01 | End: 2017-12-01 | Stop reason: HOSPADM

## 2017-12-01 RX ORDER — DIPHENHYDRAMINE HYDROCHLORIDE 50 MG/ML
25 INJECTION INTRAMUSCULAR; INTRAVENOUS EVERY 6 HOURS PRN
Status: DISCONTINUED | OUTPATIENT
Start: 2017-12-01 | End: 2017-12-03 | Stop reason: HOSPADM

## 2017-12-01 RX ORDER — DIPHENHYDRAMINE HYDROCHLORIDE 50 MG/ML
INJECTION INTRAMUSCULAR; INTRAVENOUS PRN
Status: DISCONTINUED | OUTPATIENT
Start: 2017-12-01 | End: 2017-12-01

## 2017-12-01 RX ORDER — ONDANSETRON 4 MG/1
4 TABLET, ORALLY DISINTEGRATING ORAL EVERY 6 HOURS PRN
Status: DISCONTINUED | OUTPATIENT
Start: 2017-12-01 | End: 2017-12-03 | Stop reason: HOSPADM

## 2017-12-01 RX ORDER — ONDANSETRON 2 MG/ML
4 INJECTION INTRAMUSCULAR; INTRAVENOUS EVERY 30 MIN PRN
Status: DISCONTINUED | OUTPATIENT
Start: 2017-12-01 | End: 2017-12-01 | Stop reason: HOSPADM

## 2017-12-01 RX ORDER — GLYCOPYRROLATE 0.2 MG/ML
INJECTION, SOLUTION INTRAMUSCULAR; INTRAVENOUS PRN
Status: DISCONTINUED | OUTPATIENT
Start: 2017-12-01 | End: 2017-12-01

## 2017-12-01 RX ORDER — AMOXICILLIN 250 MG
1-2 CAPSULE ORAL 2 TIMES DAILY
Status: DISCONTINUED | OUTPATIENT
Start: 2017-12-01 | End: 2017-12-03 | Stop reason: HOSPADM

## 2017-12-01 RX ORDER — FENTANYL CITRATE 50 UG/ML
INJECTION, SOLUTION INTRAMUSCULAR; INTRAVENOUS PRN
Status: DISCONTINUED | OUTPATIENT
Start: 2017-12-01 | End: 2017-12-01

## 2017-12-01 RX ORDER — ONDANSETRON 2 MG/ML
4 INJECTION INTRAMUSCULAR; INTRAVENOUS EVERY 6 HOURS PRN
Status: DISCONTINUED | OUTPATIENT
Start: 2017-12-01 | End: 2017-12-03 | Stop reason: HOSPADM

## 2017-12-01 RX ORDER — AMOXICILLIN 250 MG
1 CAPSULE ORAL 2 TIMES DAILY PRN
Status: DISCONTINUED | OUTPATIENT
Start: 2017-12-01 | End: 2017-12-03 | Stop reason: HOSPADM

## 2017-12-01 RX ADMIN — ROCURONIUM BROMIDE 10 MG: 10 INJECTION INTRAVENOUS at 10:16

## 2017-12-01 RX ADMIN — SODIUM CHLORIDE: 9 INJECTION, SOLUTION INTRAVENOUS at 14:47

## 2017-12-01 RX ADMIN — HYDROMORPHONE HYDROCHLORIDE 0.5 MG: 1 INJECTION, SOLUTION INTRAMUSCULAR; INTRAVENOUS; SUBCUTANEOUS at 10:11

## 2017-12-01 RX ADMIN — ONDANSETRON 4 MG: 2 INJECTION INTRAMUSCULAR; INTRAVENOUS at 11:25

## 2017-12-01 RX ADMIN — Medication 5 MG: at 11:08

## 2017-12-01 RX ADMIN — DEXAMETHASONE SODIUM PHOSPHATE 4 MG: 4 INJECTION, SOLUTION INTRA-ARTICULAR; INTRALESIONAL; INTRAMUSCULAR; INTRAVENOUS; SOFT TISSUE at 09:50

## 2017-12-01 RX ADMIN — ONDANSETRON 4 MG: 2 SOLUTION INTRAMUSCULAR; INTRAVENOUS at 15:38

## 2017-12-01 RX ADMIN — HYDROMORPHONE HYDROCHLORIDE 0.5 MG: 1 INJECTION, SOLUTION INTRAMUSCULAR; INTRAVENOUS; SUBCUTANEOUS at 11:46

## 2017-12-01 RX ADMIN — FENTANYL CITRATE 50 MCG: 50 INJECTION, SOLUTION INTRAMUSCULAR; INTRAVENOUS at 11:32

## 2017-12-01 RX ADMIN — PROCHLORPERAZINE EDISYLATE 10 MG: 5 INJECTION INTRAMUSCULAR; INTRAVENOUS at 16:13

## 2017-12-01 RX ADMIN — HYDROMORPHONE HYDROCHLORIDE: 10 INJECTION, SOLUTION INTRAMUSCULAR; INTRAVENOUS; SUBCUTANEOUS at 12:24

## 2017-12-01 RX ADMIN — FENTANYL CITRATE 50 MCG: 50 INJECTION INTRAMUSCULAR; INTRAVENOUS at 11:50

## 2017-12-01 RX ADMIN — CEFAZOLIN SODIUM 2 G: 2 INJECTION, SOLUTION INTRAVENOUS at 09:30

## 2017-12-01 RX ADMIN — HYDROMORPHONE HYDROCHLORIDE 0.5 MG: 1 INJECTION, SOLUTION INTRAMUSCULAR; INTRAVENOUS; SUBCUTANEOUS at 12:27

## 2017-12-01 RX ADMIN — SENNOSIDES AND DOCUSATE SODIUM 2 TABLET: 8.6; 5 TABLET ORAL at 20:30

## 2017-12-01 RX ADMIN — DIPHENHYDRAMINE HYDROCHLORIDE 12.5 MG: 50 INJECTION, SOLUTION INTRAMUSCULAR; INTRAVENOUS at 10:42

## 2017-12-01 RX ADMIN — HYDROMORPHONE HYDROCHLORIDE 0.5 MG: 1 INJECTION, SOLUTION INTRAMUSCULAR; INTRAVENOUS; SUBCUTANEOUS at 12:07

## 2017-12-01 RX ADMIN — MIDAZOLAM HYDROCHLORIDE 1 MG: 1 INJECTION, SOLUTION INTRAMUSCULAR; INTRAVENOUS at 09:30

## 2017-12-01 RX ADMIN — ROCURONIUM BROMIDE 50 MG: 10 INJECTION INTRAVENOUS at 09:34

## 2017-12-01 RX ADMIN — ROCURONIUM BROMIDE 10 MG: 10 INJECTION INTRAVENOUS at 11:02

## 2017-12-01 RX ADMIN — Medication 5 MG: at 11:05

## 2017-12-01 RX ADMIN — PROPOFOL 200 MG: 10 INJECTION, EMULSION INTRAVENOUS at 09:34

## 2017-12-01 RX ADMIN — LIDOCAINE HYDROCHLORIDE 80 MG: 20 INJECTION, SOLUTION INFILTRATION; PERINEURAL at 09:34

## 2017-12-01 RX ADMIN — GLYCOPYRROLATE 0.2 MG: 0.2 INJECTION, SOLUTION INTRAMUSCULAR; INTRAVENOUS at 09:48

## 2017-12-01 RX ADMIN — HYDROMORPHONE HYDROCHLORIDE: 10 INJECTION, SOLUTION INTRAMUSCULAR; INTRAVENOUS; SUBCUTANEOUS at 23:46

## 2017-12-01 RX ADMIN — NEOSTIGMINE METHYLSULFATE 5 MG: 1 INJECTION INTRAMUSCULAR; INTRAVENOUS; SUBCUTANEOUS at 11:22

## 2017-12-01 RX ADMIN — SODIUM CHLORIDE, POTASSIUM CHLORIDE, SODIUM LACTATE AND CALCIUM CHLORIDE: 600; 310; 30; 20 INJECTION, SOLUTION INTRAVENOUS at 09:17

## 2017-12-01 RX ADMIN — FENTANYL CITRATE 50 MCG: 50 INJECTION, SOLUTION INTRAMUSCULAR; INTRAVENOUS at 09:52

## 2017-12-01 RX ADMIN — GLYCOPYRROLATE 0.8 MG: 0.2 INJECTION, SOLUTION INTRAMUSCULAR; INTRAVENOUS at 11:22

## 2017-12-01 RX ADMIN — ROCURONIUM BROMIDE 20 MG: 10 INJECTION INTRAVENOUS at 09:52

## 2017-12-01 RX ADMIN — SODIUM CHLORIDE, POTASSIUM CHLORIDE, SODIUM LACTATE AND CALCIUM CHLORIDE: 600; 310; 30; 20 INJECTION, SOLUTION INTRAVENOUS at 11:06

## 2017-12-01 RX ADMIN — HYDROMORPHONE HYDROCHLORIDE 0.5 MG: 1 INJECTION, SOLUTION INTRAMUSCULAR; INTRAVENOUS; SUBCUTANEOUS at 12:51

## 2017-12-01 RX ADMIN — ACETAMINOPHEN 975 MG: 325 TABLET, FILM COATED ORAL at 15:06

## 2017-12-01 RX ADMIN — Medication 5 MG: at 09:47

## 2017-12-01 RX ADMIN — FENTANYL CITRATE 25 MCG: 50 INJECTION INTRAMUSCULAR; INTRAVENOUS at 12:15

## 2017-12-01 RX ADMIN — HYDROMORPHONE HYDROCHLORIDE 0.5 MG: 1 INJECTION, SOLUTION INTRAMUSCULAR; INTRAVENOUS; SUBCUTANEOUS at 12:15

## 2017-12-01 RX ADMIN — MIDAZOLAM HYDROCHLORIDE 1 MG: 1 INJECTION, SOLUTION INTRAMUSCULAR; INTRAVENOUS at 09:04

## 2017-12-01 RX ADMIN — PSYLLIUM HUSK 1 PACKET: 3.4 POWDER ORAL at 15:06

## 2017-12-01 RX ADMIN — FAMOTIDINE 20 MG: 10 INJECTION, SOLUTION INTRAVENOUS at 20:29

## 2017-12-01 RX ADMIN — FENTANYL CITRATE 50 MCG: 50 INJECTION, SOLUTION INTRAMUSCULAR; INTRAVENOUS at 10:01

## 2017-12-01 RX ADMIN — KETOROLAC TROMETHAMINE 30 MG: 30 INJECTION, SOLUTION INTRAMUSCULAR at 12:28

## 2017-12-01 RX ADMIN — FENTANYL CITRATE 25 MCG: 50 INJECTION INTRAMUSCULAR; INTRAVENOUS at 12:02

## 2017-12-01 RX ADMIN — ROCURONIUM BROMIDE 10 MG: 10 INJECTION INTRAVENOUS at 10:42

## 2017-12-01 RX ADMIN — HYDROMORPHONE HYDROCHLORIDE 0.5 MG: 1 INJECTION, SOLUTION INTRAMUSCULAR; INTRAVENOUS; SUBCUTANEOUS at 11:57

## 2017-12-01 RX ADMIN — FENTANYL CITRATE 100 MCG: 50 INJECTION, SOLUTION INTRAMUSCULAR; INTRAVENOUS at 09:34

## 2017-12-01 RX ADMIN — KETOROLAC TROMETHAMINE 30 MG: 30 INJECTION, SOLUTION INTRAMUSCULAR at 20:29

## 2017-12-01 ASSESSMENT — LIFESTYLE VARIABLES: TOBACCO_USE: 0

## 2017-12-01 NOTE — IP AVS SNAPSHOT
[facility] [address] [facility]    [address] [place] [address]    Phone:  [phone]                                       After Visit Summary   12/1/2017    Lev Cabral    MRN: [id number]           After Visit Summary Signature Page     I have received my discharge instructions, and my questions have been answered. I have discussed any challenges I see with this plan with the nurse or doctor.    ..........................................................................................................................................  Patient/Patient Representative Signature      ..........................................................................................................................................  Patient Representative Print Name and Relationship to Patient    ..................................................               ................................................  Date                                            Time    ..........................................................................................................................................  Reviewed by Signature/Title    ...................................................              ..............................................  Date                                                            Time

## 2017-12-01 NOTE — ANESTHESIA POSTPROCEDURE EVALUATION
Patient: Lev Cabral    Procedure(s):  RESECTION OF LEFT ANTERIOR CHEST WALL MASS AND RECONSTRUCTION WITH GOR-TIM SOFT TISSUE PATCH, PRIMARY REPAIR OF DIAPHRAGM - Wound Class: I-Clean    Diagnosis:LEFT CHEST WALL MASS  Diagnosis Additional Information: No value filed.    Anesthesia Type:  General, ETT    Note:  Anesthesia Post Evaluation    Patient location during evaluation: PACU  Patient participation: Able to fully participate in evaluation  Level of consciousness: awake and alert  Pain management: adequate  Airway patency: patent  Cardiovascular status: acceptable  Respiratory status: acceptable  Hydration status: acceptable  PONV: none     Anesthetic complications: None          Last vitals:  Vitals:    12/01/17 1210 12/01/17 1220 12/01/17 1230   BP: 139/63 (!) 153/102 (!) 140/103   Pulse:      Resp: 14 12 15   Temp: 36.3  C (97.3  F) 36.5  C (97.7  F) 36.6  C (97.9  F)   SpO2: 98% 99% 98%         Electronically Signed By: Zheng Holt MD  December 1, 2017  12:52 PM

## 2017-12-01 NOTE — BRIEF OP NOTE
[facility] Brief Operative Note    Pre-operative diagnosis: LEFT CHEST WALL MASS   Post-operative diagnosis left anterior chest wall mass     Procedure: Procedure(s):  RESECTION OF LEFT ANTERIOR CHEST WALL MASS AND RECONSTRUCTION WITH GOR-TIM SOFT TISSUE PATCH, PRIMARY REPAIR OF DIAPHRAGM - Wound Class: I-Clean   Surgeon(s): Surgeon(s) and Role:     * Faizan Sterling MD - Primary     * Neda Waite PA-C - Assisting   Estimated blood loss: 15 mL    Specimens:   ID Type Source Tests Collected by Time Destination   A : left anterior chest wall mass Tissue Chest SURGICAL PATHOLOGY EXAM Faizan Sterling MD 12/1/2017 10:27 AM       Findings: Left chest wall mass arising from anterior left seventh rib, resected with grossly clear margins- diaphragm closed primarily with #1 Prolene, Await final pathology

## 2017-12-01 NOTE — PROGRESS NOTES
Admission medication history interview status for the 12/1/2017  admission is complete. See EPIC admission navigator for prior to admission medications     Medication history source reliability:Good    Medication history interview source(s):Patient    Medication history resources (including written lists, pill bottles, clinic record):None    Primary pharmacy.[facility]    Additional medication history information not noted on PTA med list :None    Time spent in this activity: 40 minutes    Prior to Admission medications    Medication Sig Last Dose Taking? Auth Provider   VITAMIN D, CHOLECALCIFEROL, PO Take 1-2 tablets by mouth daily 11/30/2017 at AM Yes Reported, Patient   psyllium (METAMUCIL) 58.6 % POWD Take 2 Tablespoonful by mouth daily 11/30/2017 at AM Yes Reported, Patient

## 2017-12-01 NOTE — ANESTHESIA PREPROCEDURE EVALUATION
Procedure: Procedure(s):  REPAIR CHEST WALL  Preop diagnosis: LEFT CHEST WALL MASS    Allergies   Allergen Reactions     No Known Drug Allergies      Past Medical History:   Diagnosis Date     Unspecified adjustment reaction      Past Surgical History:   Procedure Laterality Date     APPENDECTOMY       COLONOSCOPY  2009    Dr. Oliva     COLONOSCOPY N/A 2/9/2017    Procedure: COLONOSCOPY;  Surgeon: Amos Loving MD;  Location:  GI     ENT SURGERY      tonsils     ORTHOPEDIC SURGERY  1999    neck fusion- 3 level     ORTHOPEDIC SURGERY Left     left knee arthroscopic     Social History   Substance Use Topics     Smoking status: Never Smoker     Smokeless tobacco: Never Used     Alcohol use No     Prior to Admission medications    Medication Sig Start Date End Date Taking? Authorizing Provider   VITAMIN D, CHOLECALCIFEROL, PO Take 1-2 tablets by mouth daily   Yes Reported, Patient   psyllium (METAMUCIL) 58.6 % POWD Take 2 Tablespoonful by mouth daily   Yes Reported, Patient     Current Facility-Administered Medications Ordered in Epic   Medication Dose Route Frequency Last Rate Last Dose     ceFAZolin (ANCEF) intermittent infusion 2 g in 100 mL dextrose PRE-MIX  2 g Intravenous Pre-Op/Pre-procedure x 1 dose         ceFAZolin (ANCEF) intermittent infusion 1 g (pre-mix)  1 g Intravenous See Admin Instructions         lidocaine 1 % 1 mL  1 mL Other Q1H PRN         lactated ringers infusion   Intravenous Continuous         midazolam (VERSED) injection 1 mg  1 mg Intravenous Pre-Op/Pre-procedure x 1 dose         No current [facility]-ordered outpatient prescriptions on file.       lactated ringers       Wt Readings from Last 1 Encounters:   12/01/17 98 kg (216 lb)     Temp Readings from Last 1 Encounters:   12/01/17 36.4  C (97.6  F) (Temporal)     BP Readings from Last 6 Encounters:   12/01/17 148/90   11/21/17 116/82   07/21/17 110/76   07/06/17 126/80   02/09/17 120/78   03/30/16 136/76     Pulse Readings from Last 4  Encounters:   12/01/17 56   11/21/17 62   07/21/17 60   07/06/17 68     Resp Readings from Last 1 Encounters:   12/01/17 16     SpO2 Readings from Last 1 Encounters:   12/01/17 97%     Recent Labs   Lab Test  07/21/17   0911  08/03/15   1648   NA  140  139   POTASSIUM  4.3  3.8   CHLORIDE  110*  106   CO2  24  24   ANIONGAP  6  9   GLC  112*  89   BUN  28  14   CR  1.02  0.99   JENNIFER  9.2  8.7     Recent Labs   Lab Test  08/03/15   1648  05/01/14   0923   AST  31  31   ALT  45  45   ALKPHOS  82  84   BILITOTAL  1.1  0.7     Recent Labs   Lab Test  12/01/17   0831  11/21/17   1549   WBC  6.5   --    HGB  16.3  16.6   PLT  189   --      No results for input(s): ABO, RH in the last 93641 hours.  No results for input(s): INR, PTT in the last 07931 hours.   No results for input(s): TROPI in the last 07522 hours.  No results for input(s): PH, PCO2, PO2, HCO3 in the last 96479 hours.  No results for input(s): HCG in the last 61149 hours.  Recent Results (from the past 744 hour(s))   CT Chest w/o Contrast    Narrative    CT CHEST WITHOUT CONTRAST  11/17/2017 7:45 AM     HISTORY:  Rib lesion.    TECHNIQUE: Axial images from the thoracic inlet to the lung bases are  performed with additional coronal reformatted images. No contrast is  utilized.  Radiation dose for this scan was reduced using automated  exposure control, adjustment of the mA and/or kV according to patient  size, or iterative reconstruction technique.    FINDINGS:  The lung bases are clear. No pleural or pericardial fluid.  Heart is normal in size. Esophagus is unremarkable. No enlarged  axillary or intrathoracic lymph nodes. Thyroid gland appears normal in  size where imaged. Limited images upper abdomen are within normal  limits. Bone window examination demonstrates mild degenerative spine  changes. There is a partially calcified mass which appears to arise  out of the costochondral cartilage of the anterior left seventh rib.  This measures 4.5 x 3.7 cm on  series 3, image 47 and is most  consistent with a cartilaginous lesion. 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      Impression    IMPRESSION: Cartilaginous-appearing lesion arising out of the anterior  left seventh rib costochondral cartilage. 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 No other similar lesions are appreciated. There is no  obvious deeper invasion into the chest wall.    CARLIE CANAS MD       RECENT LABS:   ECG:   ECHO:     Anesthesia Evaluation     .             ROS/MED HX    ENT/Pulmonary:      (-) tobacco use and sleep apnea   Neurologic:       Cardiovascular:         METS/Exercise Tolerance:     Hematologic:         Musculoskeletal:         GI/Hepatic:        (-) GERD   Renal/Genitourinary:         Endo:         Psychiatric:         Infectious Disease:         Malignancy:         Other:                     Physical Exam  Normal systems: cardiovascular, pulmonary and dental    Airway   Mallampati: II  TM distance: >3 FB  Neck ROM: limited    Dental     Cardiovascular       Pulmonary                     Anesthesia Plan      History & Physical Review  History and physical reviewed and following examination; no interval change.    ASA Status:  2 .    NPO Status:  > 8 hours    Plan for General and ETT with Intravenous and Propofol induction. Maintenance will be Inhalation.    PONV prophylaxis:  Ondansetron (or other 5HT-3) and Dexamethasone or Solumedrol  Additional equipment: Double Lumen ETT 12.5mg Benadryl, 4mg Decadron and Zofran for PONV      Postoperative Care      Consents  Anesthetic plan, risks, benefits and alternatives discussed with:  Patient and Spouse..                          .

## 2017-12-01 NOTE — PLAN OF CARE
Problem: Patient Care Overview  Goal: Plan of Care/Patient Progress Review  Outcome: Improving  Arrived to floor at 13:50.  A&O. AVSS,  CTx1 to -20 suction, 100 ml sanguinous output over 6 hours.  Dressing c/d/i.  Bands in place.  No AL, no crepitus.  LS dim left.  IS to 1500, done frequently, independently.  BS hypoactive, chronic constipation per pt, metamucil given.   Stood bedside.    Very pleasant patient.    DTV.

## 2017-12-01 NOTE — ANESTHESIA CARE TRANSFER NOTE
Patient: Lev Cabral    Procedure(s):  RESECTION OF LEFT ANTERIOR CHEST WALL MASS AND RECONSTRUCTION, PRIMARY REPAIR OF DIAPHRAGM - Wound Class: I-Clean    Diagnosis: LEFT CHEST WALL MASS  Diagnosis Additional Information: No value filed.    Anesthesia Type:   General, ETT     Note:  Airway :Face Mask  Patient transferred to:PACU  Comments: Pt to PACU. VSS. Report complete to RNHandoff Report: Identifed the Patient, Identified the Reponsible Provider, Reviewed the pertinent medical history, Discussed the surgical course, Reviewed Intra-OP anesthesia mangement and issues during anesthesia, Set expectations for post-procedure period and Allowed opportunity for questions and acknowledgement of understanding      Vitals: (Last set prior to Anesthesia Care Transfer)    CRNA VITALS  12/1/2017 1105 - 12/1/2017 1140      12/1/2017             Resp Rate (set): 10                Electronically Signed By: Arely Hernandez CRNA, APRN CRNA  December 1, 2017  11:40 AM

## 2017-12-01 NOTE — IP AVS SNAPSHOT
MRN:[id number]                      After Visit Summary   12/1/2017    Lev Cabral    MRN: [id number]           Thank you!     Thank you for choosing [facility] for your care. Our goal is always to provide you with excellent care. Hearing back from our patients is one way we can continue to improve our services. Please take a few minutes to complete the written survey that you may receive in the mail after you visit with us. Thank you!        Patient Information     Date Of Birth          1955        Designated Caregiver       Most Recent Value    Caregiver    Will someone help with your care after discharge? yes    Name of designated caregiver CATHIE    Phone number of caregiver [phone]    Caregiver address [address]      About your hospital stay     You were admitted on:  December 1, 2017 You last received care in the:  [place] [facility] [address] [facility]    You were discharged on:  December 3, 2017       Who to Call     For medical emergencies, please call 911.  For non-urgent questions about your medical care, please call your primary care provider or clinic, [phone]  For questions related to your surgery, please call your surgery clinic        Attending Provider     Provider Faizan Mccoy MD Thoracic Diseases       Primary Care Provider Office Phone # Fax #    Cr Popeye Corley PA-C [phone]      Further instructions from your care team       [facility]  Discharge Orders & Follow-up Care  Video-Assisted: Thoracoscopy - Thoracotomy    Call Kenia Sterling  office at [phone] to make a return appointment with a chest x-ray on Monday 12-11.  Your appointment will be with either Neda Waite PA-C or Ivone Hernandez PA-C, or Dr. Sterling.      Our office is located at:  [facility], [address] [place] [address], [address], [place] [address].  Kenia will explain where to park when you call for  "an appointment.    A. Patient Care  Call Dr Sterling  office @ [phone] if:  ? Severe chills or a fever or 100.4 F.  temperature on two occasions  ? Increased incisional pain and/or redness  ? Presence of unusual incisional or chest tube site drainage  ? Coughing up bright red blood or greenish-yellow secretions  ? Significant increase in shortness of breath    Pain Relief  You have been given a prescription for narcotic pain medicine.  You may also take ibuprofen and acetaminophen over the counter.  Recommended dosages are:  600 mg Ibuprofen every 6 hours as needed and 650 mg Acetaminophen every 4 hours as needed.  Many patients get good pain relief by \"staggering\" these medications.     No driving while on narcotics.     Activity  ___ No activity restrictions for a scope procedure/thoracoscopy  XXX No heavy lifting greater than 8-10 pounds on the operative side for 8 weeks for a thoracotomy    Wound Care  Remove all dressings in 48 hours and then you may shower.  Wash the incision and chest tube site(s) daily with soap and water. No bathing or immersing incision underwater for approximately 2 weeks or until the chest tube sites are completely healed.     Place a dry gauze dressing over the chest tube site(s) because it(they) will drain a few days.  Do not be alarmed if there is drainage of a large amount of fluid (should be pink or yellow-- or somewhat bloody) either spontaneously or with coughing or exertion. If this happens, just place a large dry gauze dressing over the chest tube site-- it will stop and scab over in about a week or so. Once the drainage stops, then leave the chest tube site(s) open to air.     White steri strips will be present on the incision(s). They will peel off within about 10 days-- otherwise, they will be removed at your post-op appointment.     B. Respiratory  XXX Utilize Incentive spirometer 10 times in a row every few hours while awake for a few weeks after discharging home from " "the hospital          Pending Results     Date and Time Order Name Status Description    2017 1027 Surgical pathology exam In process             Admission Information     Date & Time Provider Department Dept. Phone    2017 Faizan Sterling MD [place] [address] [facility] [phone]      Your Vitals Were     Blood Pressure Pulse Temperature Respirations Height Weight    128/84 52 97.4  F (36.3  C) 16 1.829 m (6') 98 kg (216 lb)    Pulse Oximetry BMI (Body Mass Index)                96% 29.29 kg/m2          [employer] Information     [employer] lets you send messages to your doctor, view your test results, renew your prescriptions, schedule appointments and more. To sign up, go to [email or web address] . Click on \"Log in\" on the left side of the screen, which will take you to the Welcome page. Then click on \"Sign up Now\" on the right side of the page.     You will be asked to enter the access code listed below, as well as some personal information. Please follow the directions to create your username and password.     Your access code is: [email or web address]  Expires: 2018  3:47 PM     Your access code will  in 90 days. If you need help or a new code, please call your [facility] clinic or [phone].        Care EveryWhere ID     This is your Care EveryWhere ID. This could be used by other organizations to access your [facility] medical records  [id number]        Equal Access to Services     KATHLEEN HANSEN : Hadii lolita mayorga hadasho Soomaali, waaxda luqadaha, qaybta kaalmada adeegyada, waxay aspen allen . So [facility] [phone].    ATENCIÓN: Si habla español, tiene a henry disposición servicios gratuitos de asistencia lingüística. Llame al [phone].    We comply with applicable federal civil rights laws and [place] laws. We do not discriminate on the basis of race, color, national origin, age, disability, sex, sexual orientation, or gender identity.             "   Review of your medicines      START taking        Dose / Directions    HYDROmorphone 2 MG tablet   Commonly known as:  DILAUDID        Dose:  2-4 mg   Take 1-2 tablets (2-4 mg) by mouth every 3 hours as needed for moderate to severe pain   Quantity:  40 tablet   Refills:  0         CONTINUE these medicines which have NOT CHANGED        Dose / Directions    psyllium 58.6 % Powd   Commonly known as:  METAMUCIL        Dose:  2 Tablespoonful   Take 2 Tablespoonful by mouth daily   Refills:  0       VITAMIN D (CHOLECALCIFEROL) PO        Dose:  1-2 tablet   Take 1-2 tablets by mouth daily   Refills:  0            Where to get your medicines      Some of these will need a paper prescription and others can be bought over the counter. Ask your nurse if you have questions.     Bring a paper prescription for each of these medications     HYDROmorphone 2 MG tablet                Protect others around you: Learn how to safely use, store and throw away your medicines at www.disposemymeds.org.             Medication List: This is a list of all your medications and when to take them. Check marks below indicate your daily home schedule. Keep this list as a reference.      Medications           Morning Afternoon Evening Bedtime As Needed    HYDROmorphone 2 MG tablet   Commonly known as:  DILAUDID   Take 1-2 tablets (2-4 mg) by mouth every 3 hours as needed for moderate to severe pain   Last time this was given:  2 mg on 12/3/2017 11:00 AM                                psyllium 58.6 % Powd   Commonly known as:  METAMUCIL   Take 2 Tablespoonful by mouth daily                                VITAMIN D (CHOLECALCIFEROL) PO   Take 1-2 tablets by mouth daily

## 2017-12-02 ENCOUNTER — APPOINTMENT (OUTPATIENT)
Dept: GENERAL RADIOLOGY | Facility: CLINIC | Age: 62
DRG: 497 | End: 2017-12-02
Attending: PHYSICIAN ASSISTANT
Payer: COMMERCIAL

## 2017-12-02 LAB — GLUCOSE BLDC GLUCOMTR-MCNC: 105 MG/DL (ref 70–99)

## 2017-12-02 PROCEDURE — 25000132 ZZH RX MED GY IP 250 OP 250 PS 637: Performed by: PHYSICIAN ASSISTANT

## 2017-12-02 PROCEDURE — 71010 XR CHEST PORT 1 VW: CPT

## 2017-12-02 PROCEDURE — 25000125 ZZHC RX 250: Performed by: PHYSICIAN ASSISTANT

## 2017-12-02 PROCEDURE — 00000146 ZZHCL STATISTIC GLUCOSE BY METER IP

## 2017-12-02 PROCEDURE — 25000128 H RX IP 250 OP 636: Performed by: PHYSICIAN ASSISTANT

## 2017-12-02 PROCEDURE — 12000007 ZZH R&B INTERMEDIATE

## 2017-12-02 RX ADMIN — IBUPROFEN 600 MG: 600 TABLET ORAL at 17:43

## 2017-12-02 RX ADMIN — ACETAMINOPHEN 975 MG: 325 TABLET, FILM COATED ORAL at 06:49

## 2017-12-02 RX ADMIN — ACETAMINOPHEN 975 MG: 325 TABLET, FILM COATED ORAL at 22:02

## 2017-12-02 RX ADMIN — SENNOSIDES AND DOCUSATE SODIUM 2 TABLET: 8.6; 5 TABLET ORAL at 09:53

## 2017-12-02 RX ADMIN — HYDROMORPHONE HYDROCHLORIDE 2 MG: 2 TABLET ORAL at 09:52

## 2017-12-02 RX ADMIN — KETOROLAC TROMETHAMINE 30 MG: 30 INJECTION, SOLUTION INTRAMUSCULAR at 06:49

## 2017-12-02 RX ADMIN — PSYLLIUM HUSK 1 PACKET: 3.4 POWDER ORAL at 09:52

## 2017-12-02 RX ADMIN — ACETAMINOPHEN 975 MG: 325 TABLET, FILM COATED ORAL at 14:22

## 2017-12-02 RX ADMIN — BACITRACIN: 500 OINTMENT TOPICAL at 10:20

## 2017-12-02 RX ADMIN — ENOXAPARIN SODIUM 40 MG: 40 INJECTION SUBCUTANEOUS at 06:49

## 2017-12-02 RX ADMIN — HYDROMORPHONE HYDROCHLORIDE 2 MG: 2 TABLET ORAL at 14:26

## 2017-12-02 RX ADMIN — SENNOSIDES AND DOCUSATE SODIUM 2 TABLET: 8.6; 5 TABLET ORAL at 21:11

## 2017-12-02 RX ADMIN — RANITIDINE 150 MG: 150 TABLET ORAL at 19:50

## 2017-12-02 RX ADMIN — IBUPROFEN 600 MG: 600 TABLET ORAL at 21:12

## 2017-12-02 RX ADMIN — RANITIDINE 150 MG: 150 TABLET ORAL at 06:49

## 2017-12-02 RX ADMIN — SODIUM CHLORIDE: 9 INJECTION, SOLUTION INTRAVENOUS at 03:33

## 2017-12-02 NOTE — PLAN OF CARE
Problem: Patient Care Overview  Goal: Plan of Care/Patient Progress Review  Outcome: Improving  Patient is alert and orientated X 4, up with assist of one, vital signs stable, Chest Tube to suction at -20, output 18 ML, pain managed with PCA Dilaudid and scheduled Toradol. Patient due to void, bladder scan at 10 pm 518 ML, patient is trying to void, continue to monitor. Void 50 ML

## 2017-12-02 NOTE — PLAN OF CARE
Problem: Patient Care Overview  Goal: Plan of Care/Patient Progress Review  Outcome: Improving   A&O. AVSS,  CTx1 to -20 suction, 0 ml sanguinous output over 8 hours. Dressing removed.  Incision w/steri strips, wdl.  CT dressing changed, site wdi.   Bands in place.  No AL, no crepitus.  LS dim left.  IS to 1500, done frequently, independently.  BS hypoactive, chronic constipation per pt, metamucil given. No flatus yet.   Stood bedside.    Very pleasant patient.

## 2017-12-02 NOTE — PLAN OF CARE
Problem: Patient Care Overview  Goal: Plan of Care/Patient Progress Review  Outcome: No Change  VSS on RA. Pain managed with PCA dilaudid and scheduled tylenol and toradol. CT to -20 suction, dressing CDI, no AL, no crepitus, 42 cc serosanguenous output. Incision CDI. LS diminished. Hypo BS, denies flatus. Up SBA. Tolerating PO but little appetite. Pt has voided 300 cc over shift, CTM urine output since it has been low since surgery, pt denies bladder discomfort or fullness. IS encouraged.

## 2017-12-02 NOTE — PROGRESS NOTES
THORACIC SURGERY POD # 1    Doing well  AVSS  On RA  Good pain control    CXR ok    Discussed findings and procedure    Ambulate  resp care ++  CT suction    INDIRA PETER MD [facility] ONCOLOGY THORACIC SURGERY  CELL:  [phone]  OFFICE: [phone]

## 2017-12-03 ENCOUNTER — APPOINTMENT (OUTPATIENT)
Dept: GENERAL RADIOLOGY | Facility: CLINIC | Age: 62
DRG: 497 | End: 2017-12-03
Attending: PHYSICIAN ASSISTANT
Payer: COMMERCIAL

## 2017-12-03 VITALS
SYSTOLIC BLOOD PRESSURE: 128 MMHG | HEART RATE: 52 BPM | HEIGHT: 72 IN | TEMPERATURE: 97.4 F | BODY MASS INDEX: 29.26 KG/M2 | DIASTOLIC BLOOD PRESSURE: 84 MMHG | OXYGEN SATURATION: 96 % | RESPIRATION RATE: 16 BRPM | WEIGHT: 216 LBS

## 2017-12-03 LAB — GLUCOSE BLDC GLUCOMTR-MCNC: 100 MG/DL (ref 70–99)

## 2017-12-03 PROCEDURE — 00000146 ZZHCL STATISTIC GLUCOSE BY METER IP

## 2017-12-03 PROCEDURE — 25000128 H RX IP 250 OP 636: Performed by: PHYSICIAN ASSISTANT

## 2017-12-03 PROCEDURE — 25000132 ZZH RX MED GY IP 250 OP 250 PS 637: Performed by: PHYSICIAN ASSISTANT

## 2017-12-03 PROCEDURE — 71010 XR CHEST PORT 1 VW: CPT

## 2017-12-03 RX ORDER — HYDROMORPHONE HYDROCHLORIDE 2 MG/1
2-4 TABLET ORAL
Qty: 40 TABLET | Refills: 0 | Status: SHIPPED | OUTPATIENT
Start: 2017-12-03 | End: 2018-09-27

## 2017-12-03 RX ADMIN — ACETAMINOPHEN 975 MG: 325 TABLET, FILM COATED ORAL at 06:34

## 2017-12-03 RX ADMIN — HYDROMORPHONE HYDROCHLORIDE 2 MG: 2 TABLET ORAL at 11:00

## 2017-12-03 RX ADMIN — PSYLLIUM HUSK 1 PACKET: 3.4 POWDER ORAL at 09:14

## 2017-12-03 RX ADMIN — IBUPROFEN 600 MG: 600 TABLET ORAL at 09:13

## 2017-12-03 RX ADMIN — ENOXAPARIN SODIUM 40 MG: 40 INJECTION SUBCUTANEOUS at 06:33

## 2017-12-03 RX ADMIN — RANITIDINE 150 MG: 150 TABLET ORAL at 06:34

## 2017-12-03 RX ADMIN — ACETAMINOPHEN 975 MG: 325 TABLET, FILM COATED ORAL at 14:06

## 2017-12-03 RX ADMIN — IBUPROFEN 600 MG: 600 TABLET ORAL at 12:51

## 2017-12-03 RX ADMIN — SENNOSIDES AND DOCUSATE SODIUM 2 TABLET: 8.6; 5 TABLET ORAL at 09:13

## 2017-12-03 NOTE — PLAN OF CARE
Problem: Patient Care Overview  Goal: Individualization & Mutuality  1415 Discharge instructions and dressing supplies given to pt and wife. Rx filled for Dilaudid and given to pt. Had bm before discharge. Denies questions. Melinda Hernandez RN

## 2017-12-03 NOTE — PROGRESS NOTES
THORACIC SURGERY POD # 2    Doing well  AVSS on RA  Minimal serous CT output  CXR ok    Wound:ok  Chest wall solid    CT out    D/c today    INDIRA PETER MD [facility] ONCOLOGY THORACIC SURGERY  CELL:  [phone]  OFFICE: [phone]

## 2017-12-03 NOTE — DISCHARGE INSTRUCTIONS
"[facility]  Discharge Orders & Follow-up Care  Video-Assisted: Thoracoscopy - Thoracotomy    Call Kenia at Dr. Sterling  office at [phone] to make a return appointment with a chest x-ray on Monday 12-11.  Your appointment will be with either Neda Waite PA-C or Ivone Hernandez PA-C, or Dr. Sterling.      Our office is located at:  [facility], [address] [place] [address], [address], [place] [address].  Kenia will explain where to park when you call for an appointment.    A. Patient Care  Call Dr Sterling  office @ [phone] if:  ? Severe chills or a fever or 100.4 F.  temperature on two occasions  ? Increased incisional pain and/or redness  ? Presence of unusual incisional or chest tube site drainage  ? Coughing up bright red blood or greenish-yellow secretions  ? Significant increase in shortness of breath    Pain Relief  You have been given a prescription for narcotic pain medicine.  You may also take ibuprofen and acetaminophen over the counter.  Recommended dosages are:  600 mg Ibuprofen every 6 hours as needed and 650 mg Acetaminophen every 4 hours as needed.  Many patients get good pain relief by \"staggering\" these medications.     No driving while on narcotics.     Activity  ___ No activity restrictions for a scope procedure/thoracoscopy  XXX No heavy lifting greater than 8-10 pounds on the operative side for 8 weeks for a thoracotomy    Wound Care  Remove all dressings in 48 hours and then you may shower.  Wash the incision and chest tube site(s) daily with soap and water. No bathing or immersing incision underwater for approximately 2 weeks or until the chest tube sites are completely healed.     Place a dry gauze dressing over the chest tube site(s) because it(they) will drain a few days.  Do not be alarmed if there is drainage of a large amount of fluid (should be pink or yellow-- or somewhat bloody) either spontaneously or with coughing or exertion. If this happens, just " place a large dry gauze dressing over the chest tube site-- it will stop and scab over in about a week or so. Once the drainage stops, then leave the chest tube site(s) open to air.     White steri strips will be present on the incision(s). They will peel off within about 10 days-- otherwise, they will be removed at your post-op appointment.     B. Respiratory  XXX Utilize Incentive spirometer 10 times in a row every few hours while awake for a few weeks after discharging home from the hospital

## 2017-12-03 NOTE — PLAN OF CARE
Problem: Patient Care Overview  Goal: Plan of Care/Patient Progress Review  Outcome: No Change  VSS on RA. Pain managed with scheduled tylenol. CT to -20 suction, no AL, no crepitus, dressing CDI, 5 cc serosanguenous output. Incision intact with steri strips. LS diminished. +BS, denies flatus, no BM since surgery. IS and acapella encouraged. Up SBA. Voiding. Tolerating PO, little appetite.

## 2017-12-03 NOTE — PLAN OF CARE
Problem: Patient Care Overview  Goal: Plan of Care/Patient Progress Review  Outcome: Improving  A/O, VSS, SBA, steri strips intact WDL, Ct -20 suction serosanguinous output, no AL, no crep, LS dim, IS 2000, acapella done, ambulated in boyd, BS, no flatus, scheduled ibuprofen and tylenol for pain.

## 2017-12-04 ENCOUNTER — TELEPHONE (OUTPATIENT)
Dept: FAMILY MEDICINE | Facility: CLINIC | Age: 62
End: 2017-12-04

## 2017-12-04 NOTE — OP NOTE
DATE OF OPERATION:  12/01/2017       SURGEON:  Faizan Sterling MD       ASSISTANT:  Neda Waite PA-C      PREOPERATIVE DIAGNOSIS:  Left anterior chest wall mass.      POSTOPERATIVE DIAGNOSIS:  Left anterior chest wall mass.      PROCEDURES:  Radical chest wall resection, including portion of the 6th and 7th left costal cartilage and adjacent rib, portion of underlying diaphragm, adjacent pectoralis major muscle and adjacent rectus muscle, primary repair of diaphragm, chest wall reconstruction with 2 mm [place]-Duglas patch.      ANESTHESIA:  General with double-lumen endotracheal tube.      INDICATIONS:  Lev Cabral is a 62-year-old gentleman who noticed an enlarging mass of his costal margin.  CT scan showed a partially calcified mass measuring approximately 5 cm arising from the 7th costal cartilage.  This is suspicious for chondrosarcoma.  Based on the findings, a resection is indicated for diagnosis and treatment.      DESCRIPTION OF PROCEDURE:  The patient was brought to the OR and placed in supine position.  Under general anesthesia with double-lumen endotracheal tube, the patient was left in the supine position.  The chest and abdomen were prepared in the usual fashion using ChloraPrep.  An elliptical incision was made transversely excising an ellipse of skin overlying the tumor.  Dissection was carried down circumferentially, keeping the margin of dissection over 2 cm circumferentially.  The costal margin was divided, as well as the lateral aspect of the 6th rib, approximately 2 cm from the tumor.  The intercostal muscle just below the 5th rib and costal cartilage was divided.  Then, the anterior rectus sheath was incised, staying away from the tumor, dividing the posterior and anterior rectus sheath.  Then, the mediastinum was entered.  The tumor was not invading the mediastinum, but some of the diaphragm was attached and some of these attachments were left on the specimen.  The diaphragm was closed  with running #1 Prolene.  Then, the resection was completed, dividing the 6th and 7th costal cartilage medially close to the sternum.  Hemostasis was verified and was excellent.  Through a separate stab wound, a 24-Turkmen Arben drain was placed.  Then, a 2 mm [place]-Duglas patch was used.  The patch was sutured around the 5th rib and costal cartilage superiorly, with the end of the costal cartilage anteriorly and laterally.  Then, the posterior rectus sheath was attached to the patch with interrupted #1 Prolene.  Then, the adjacent muscle was closed over the patch, with excellent solidity to the chest wall.  Then, the incision was closed with running 2-0 Vicryl, subcutaneous tissues closed with Insorb staples and Steri-Strips.  Pressure dressing was applied.  Estimated blood loss 200 mL.  Needle and sponge counts correct.      Neda Waite PA-C was the first assistant during the procedure.  Her role as first assistant was essential and necessary in accomplishing the steps of procedure as described above, providing exposure and retraction.         FAIZAN STERLING MD             D: 2017 12:35   T: 2017 15:19   MT: TS      Name:     AJIT YEUNG   MRN:      [id number]        Account:        [id number]   :      1955           Procedure Date: 2017      Document: [id number]       cc: Faizan Sterling MD

## 2017-12-04 NOTE — TELEPHONE ENCOUNTER
"ED/Discharge Protocol    \"Hi, my name is Lidia BERNALThomas Pena, a registered nurse, and I am calling on behalf of Wilder Corley's office at [facility].  I am calling to follow up and see how things are going for you after your recent visit.\"    \"I see that you were in the (ER/UC/IP) on 12/1/17.    How are you doing now that you are home?\" doing ok, could be worse    Is patient experiencing symptoms that may require a hospital visit?  no    Discharge Instructions    \"Let's review your discharge instructions.  What is/are the follow-up recommendations?  Pt. Response: He is f/u with Dr. Sterling    \"Were you instructed to make a follow-up appointment?\"  Pt. Response: Yes.  Has appointment been made?   Yes      \"When you see the provider, I would recommend that you bring your discharge instructions with you.    Medications    \"How many new medications are you on since your hospitalization/ED visit?\"    0-1  \"How many of your current medicines changed (dose, timing, name, etc.) while you were in the hospital/ED visit?\"   0-1  \"Do you have questions about your medications?\"   No  \"Were you newly diagnosed with heart failure, COPD, diabetes or did you have a heart attack?\"   No  For patients on insulin: \"Did you start on insulin in the hospital or did you have your insulin dose changed?\"   No    Medication reconciliation completed? Yes    Was MTM referral placed (*Make sure to put transitions as reason for referral)?   No    Call Summary    \"Do you have any questions or concerns about your condition or care plan at the moment?\"    No  Triage nurse advice given: He said it feels a little numb around his incision.  Advised to call the surgeons office about that.      Patient was in ER zero in the past year (assess appropriateness of ER visits.)      \"If you have questions or things don't continue to improve, we encourage you contact us through the main clinic number,  [phone].  Even if the clinic is not open, triage nurses are " "available 24/7 to help you.     We would like you to know that our clinic has extended hours (provide information).  We also have urgent care (provide details on closest location and hours/contact info)\"      \"Thank you for your time and take care!\"        "

## 2017-12-04 NOTE — TELEPHONE ENCOUNTER
Please contact patient for In-patient follow up.  [phone] (home)     Visit date: 12/03/2017  Diagnosis listed:Mass of Chest Wall, left  Number of visits in past 12 months:0/1

## 2017-12-07 LAB — COPATH REPORT: NORMAL

## 2017-12-09 NOTE — DISCHARGE SUMMARY
THORACIC SURGERY [facility] DISCHARGE SUMMARY  [facility]  [phone]  [email or web address]    [address]   [address] / [place] [address]  Phone: [phone]   Fax: [phone]        Re: Lev Cabral             1955             [id number]               Dates of Hospitalization: 12/1/2017 - 12/3/2017    Dear Dr. Corley:     As you are aware, we had the pleasure of caring for your patient, Lev Cabral, here at [facility].  He is a pleasant 62 year old gentleman with an enlarging left-sided mass suspicious for chondrosarcoma.    On 12/1/2017, Dr. Faizan Sterling performed a radical chest wall resection, including portion of the 6th and 7th left costal cartilage and adjacent rib, portion of underlying diaphragm, adjacent pectoralis major muscle, and adjacent rectus muscle, primary repair of diaphragm, chest wall reconstruction with 2mm [place] patch.     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  No necrosis or lymphovascular invasion identified.     His post-operative course was unremarkable.    Mr Cabral has otherwise recovered sufficiently to be discharged to home today on post-operative day #2 for further convalescence.  His incisions are healing well with no signs or symptoms of infection.  His bowels have moved sufficiently and he is tolerating diet and activity, ambulating and transferring independently.  He is currently afebrile with stable vital signs as below.     /84  Pulse 52  Temp 97.4  F (36.3  C)  Resp 16  Ht 1.829 m (6')  Wt 98 kg (216 lb)  SpO2 96%  BMI 29.29 kg/m2 ; satting on room air.    Below, you will find a full discharge medication list and instructions.  Mr Cabral was instructed to follow-up with Dr. Sterling at [facility] in [place] in 7-10 days with a Chest X-Ray prior to  that appointment.  We thank you for allowing us to participate in the care of Mr Cabral.  Please feel free to contact our office at [phone] with any questions or concerns or if we can be of any further assistance in the care of this patient.    Sincerely,  Faizan Sterling MD, [facility]    D/C Summary Prepared by Ivone Hernandez PA-C    Discharge Medications:     Review of your medicines      START taking       Dose / Directions    HYDROmorphone 2 MG tablet   Commonly known as:  DILAUDID   Used for:  Mass of chest wall, left        Dose:  2-4 mg   Take 1-2 tablets (2-4 mg) by mouth every 3 hours as needed for moderate to severe pain   Quantity:  40 tablet   Refills:  0         CONTINUE these medicines which have NOT CHANGED       Dose / Directions    psyllium 58.6 % Powd   Commonly known as:  METAMUCIL        Dose:  2 Tablespoonful   Take 2 Tablespoonful by mouth daily   Refills:  0       VITAMIN D (CHOLECALCIFEROL) PO        Dose:  1-2 tablet   Take 1-2 tablets by mouth daily   Refills:  0            Where to get your medicines      Some of these will need a paper prescription and others can be bought over the counter. Ask your nurse if you have questions.     Bring a paper prescription for each of these medications      HYDROmorphone 2 MG tablet             Discharge Instructions:  1. Daily wound care for incision and chest tube site:     Keep the occlusive dressing over your chest tube site(s) in place for two days.  Then remove the dressing and shower.      Wash incision and chest tube site(s) daily with soap and water.      Cover chest tube site with dry gauze dressing if draining/needed.     DO NOT be alarmed if your chest tube site(s) drain yellowish or light pink fluid especially with coughing or exertion for a few days. Should this happen, please cover the chest tube site with a thick dry gauze dressing and tape and change as needed.      Please inspect wounds daily and report any new redness, frankly  bloody drainage, warmth, or wound tenderness to Dr. Sterling' office at [phone].  2. Remove the white steri-strip tapes over your incision(s) in a week.   3. No driving while on narcotic pain medication.   4. Exercise your arm on affected side by gentle motion, such as climbing a wall with your fingers and brushing or combing your hair.   5. Continue daily use of incentive spirometer 10x in a row, 3-4 times per day while awake for next 2 weeks.   6. Remove steri-strips over incision in 1 week.   7. No lifting greater than 10-15 pounds for 2-3 weeks

## 2018-01-16 ENCOUNTER — OFFICE VISIT (OUTPATIENT)
Dept: DERMATOLOGY | Facility: CLINIC | Age: 63
End: 2018-01-16
Payer: COMMERCIAL

## 2018-01-16 VITALS — HEART RATE: 69 BPM | DIASTOLIC BLOOD PRESSURE: 96 MMHG | OXYGEN SATURATION: 97 % | SYSTOLIC BLOOD PRESSURE: 151 MMHG

## 2018-01-16 DIAGNOSIS — L82.1 SEBORRHEIC KERATOSIS: ICD-10-CM

## 2018-01-16 DIAGNOSIS — T14.8XXA EXCORIATION: ICD-10-CM

## 2018-01-16 DIAGNOSIS — D48.5 NEOPLASM OF UNCERTAIN BEHAVIOR OF SCALP: Primary | ICD-10-CM

## 2018-01-16 DIAGNOSIS — L91.8 ACROCHORDON: ICD-10-CM

## 2018-01-16 DIAGNOSIS — L85.3 XEROSIS OF SKIN: ICD-10-CM

## 2018-01-16 PROCEDURE — 88305 TISSUE EXAM BY PATHOLOGIST: CPT | Performed by: PHYSICIAN ASSISTANT

## 2018-01-16 PROCEDURE — 11100 HC BIOPSY SKIN/SUBQ/MUC MEM, SINGLE LESION: CPT | Performed by: PHYSICIAN ASSISTANT

## 2018-01-16 PROCEDURE — 99203 OFFICE O/P NEW LOW 30 MIN: CPT | Mod: 25 | Performed by: PHYSICIAN ASSISTANT

## 2018-01-16 NOTE — PROGRESS NOTES
Lev Cabral is a 62 year old year old male patient here today for spot on scalp. Present for 15+ years. Had it biopsied years ago and was not cancerous. Pt picks at it a lot and has noticed that it is growing. Does not hurt. Tried OTC compound W with minimal relief.      Pt also has a few skin tags on his neck and eyelid that he would like looked at today. Denies symptoms.  Patient has no other skin complaints today.  Remainder of the HPI, Meds, PMH, Allergies, FH, and SH was reviewed in chart.      Past Medical History:   Diagnosis Date     Unspecified adjustment reaction        Past Surgical History:   Procedure Laterality Date     APPENDECTOMY       COLONOSCOPY  2009    Dr. Oliva     COLONOSCOPY N/A 2/9/2017    Procedure: COLONOSCOPY;  Surgeon: Amos Loving MD;  Location:  GI     ENT SURGERY      tonsils     ORTHOPEDIC SURGERY  1999    neck fusion- 3 level     ORTHOPEDIC SURGERY Left     left knee arthroscopic     REPAIR CHEST WALL N/A 12/1/2017    Procedure: REPAIR CHEST WALL;  RESECTION OF LEFT ANTERIOR CHEST WALL MASS AND RECONSTRUCTION WITH GOR-TIM SOFT TISSUE PATCH, PRIMARY REPAIR OF DIAPHRAGM;  Surgeon: Faizan Sterling MD;  Location:  OR        Family History   Problem Relation Age of Onset     CANCER Father      brain tumor     DIABETES Father      DIABETES Mother      Type 2     Colon Cancer No family hx of        Social History     Social History     Marital status:      Spouse name: N/A     Number of children: N/A     Years of education: N/A     Occupational History     Not on file.     Social History Main Topics     Smoking status: Never Smoker     Smokeless tobacco: Never Used     Alcohol use No     Drug use: No     Sexual activity: Yes     Partners: Female     Other Topics Concern     Parent/Sibling W/ Cabg, Mi Or Angioplasty Before 65f 55m? No     Social History Narrative       Outpatient Encounter Prescriptions as of 1/16/2018   Medication Sig Dispense Refill      VITAMIN D, CHOLECALCIFEROL, PO Take 1-2 tablets by mouth daily       psyllium (METAMUCIL) 58.6 % POWD Take 2 Tablespoonful by mouth daily       HYDROmorphone (DILAUDID) 2 MG tablet Take 1-2 tablets (2-4 mg) by mouth every 3 hours as needed for moderate to severe pain (Patient not taking: Reported on 1/16/2018) 40 tablet 0     No facility-administered encounter medications on file as of 1/16/2018.        Review Of Systems:  Skin: As above  Eyes: negative  Ears/Nose/Throat: negative  Respiratory: No shortness of breath, dyspnea on exertion, cough, or hemoptysis  Cardiovascular: negative  Gastrointestinal: negative  Genitourinary: negative  Musculoskeletal: negative  Neurologic: negative  Psychiatric: negative  Hematologic/Lymphatic/Immunologic: negative  Endocrine: negative    Objective:     BP (!) 151/96  Pulse 69  SpO2 97%  Eyes: Conjunctivae/lids: Normal   ENT: Lips:  Normal  MSK: Normal  Pulm: Breathing Normal  Neuro/Psych: Orientation: Normal; Mood/Affect: Normal, NAD, WDWN  Following areas examined: Scalp, face, eyelids, lips, neck, chest, abdomen, back, and R&L upper  lower extremities. Pt wearing pants and shoes.   Findings:  Pink WD firm nontender smooth papule on left inferior occipital scalp  Brown, stuck-on scaly appearing papule on left inferior neck  Light brown pedunculated papules at anterior and lateral aspects of inferior neck  Excoriated smooth macule on right mid antihelix  Flaking and mild excoriated skin of superior back      Assessment and Plan:  1) Neoplasm of uncertain behavior  Prurigo nodularis dbt KA  TANGENTIAL EXCISION:  After consent, anesthesia with LEC and prep, tangential excision performed.  No complications and routine wound care.  May grow back and will get a scar. Based on lesion type may need to completely remove lesion.  If biopsy proven PN and area regrows can do Kenalog injections. Disc D/C picking.   2) seborrheic keratosis and acrochordon  I discussed the specifics of  tumor, prognosis, and genetics of benign lesions.  I explained that treatment of these lesions would be purely cosmetic and not medically neccessary.  I discussed with patient different removal options including excision, cryotherapy, cautery and /or laser.      3) Excoriation     Pt states he may have had his hear nicked  with an electric razor yesterday. Disc biopsy vs W/M. Pt elects to watch and monitor. If no resolution in a few weeks follow up for further evaluation.    Signs and Symptoms of non-melanoma skin cancer and ABCDEs of melanoma reviewed with patient. Patient encouraged to perform monthly self skin exams including checking nails for any changes. UV precautions reviewed with patient.       4) xerosis    Proper skin care from [facility]:    -Eliminate harsh soaps as they strip the natural oils from the skin, often resulting in dry itchy skin ( i.e. Dial, Zest, Geetha Spring)  -Use mild soaps such as Cetaphil or Dove Sensitive Skin in the shower. You do not need to use soap on arms, legs, and trunk every time you shower unless visibly soiled.   -Avoid hot or cold showers.  -After showering, lightly dry off and apply moisturizing within 2-3 minutes. This will help trap moisture in the skin.   -Aggressive use of a moisturizer at least 1-2 times a day to the entire body (including -Vanicream, Cetaphil, Aquaphor or Cerave) and moisturize hands after every washing.  -We recommend using moisturizers that come in a tub that needs to be scooped out, not a pump. This has more of an oil base. It will hold moisture in your skin much better than a water base moisturizer. The above recommended are non-pore clogging.       Follow up in yearly skin exams

## 2018-01-16 NOTE — LETTER
1/16/2018         RE: Lev Cabral  [address] [place] [address]        Dear Colleague,    Thank you for referring your patient, Lev Cabral, to the [facility]. Please see a copy of my visit note below.    Lev Cabral is a 62 year old year old male patient here today for spot on scalp. Present for 15+ years. Had it biopsied years ago and was not cancerous. Pt picks at it a lot and has noticed that it is growing. Does not hurt. Tried OTC compound W with minimal relief.      Pt also has a few skin tags on his neck and eyelid that he would like looked at today. Denies symptoms.  Patient has no other skin complaints today.  Remainder of the HPI, Meds, PMH, Allergies, FH, and SH was reviewed in chart.      Past Medical History:   Diagnosis Date     Unspecified adjustment reaction        Past Surgical History:   Procedure Laterality Date     APPENDECTOMY       COLONOSCOPY  2009    Dr. Oliva     COLONOSCOPY N/A 2/9/2017    Procedure: COLONOSCOPY;  Surgeon: Amos Loving MD;  Location:  GI     ENT SURGERY      tonsils     ORTHOPEDIC SURGERY  1999    neck fusion- 3 level     ORTHOPEDIC SURGERY Left     left knee arthroscopic     REPAIR CHEST WALL N/A 12/1/2017    Procedure: REPAIR CHEST WALL;  RESECTION OF LEFT ANTERIOR CHEST WALL MASS AND RECONSTRUCTION WITH GOR-TIM SOFT TISSUE PATCH, PRIMARY REPAIR OF DIAPHRAGM;  Surgeon: Faizan Sterling MD;  Location:  OR        Family History   Problem Relation Age of Onset     CANCER Father      brain tumor     DIABETES Father      DIABETES Mother      Type 2     Colon Cancer No family hx of        Social History     Social History     Marital status:      Spouse name: N/A     Number of children: N/A     Years of education: N/A     Occupational History     Not on file.     Social History Main Topics     Smoking status: Never Smoker     Smokeless tobacco: Never Used     Alcohol use No     Drug use: No      Sexual activity: Yes     Partners: Female     Other Topics Concern     Parent/Sibling W/ Cabg, Mi Or Angioplasty Before 65f 55m? No     Social History Narrative       Outpatient Encounter Prescriptions as of 1/16/2018   Medication Sig Dispense Refill     VITAMIN D, CHOLECALCIFEROL, PO Take 1-2 tablets by mouth daily       psyllium (METAMUCIL) 58.6 % POWD Take 2 Tablespoonful by mouth daily       HYDROmorphone (DILAUDID) 2 MG tablet Take 1-2 tablets (2-4 mg) by mouth every 3 hours as needed for moderate to severe pain (Patient not taking: Reported on 1/16/2018) 40 tablet 0     No facility-administered encounter medications on file as of 1/16/2018.        Review Of Systems:  Skin: As above  Eyes: negative  Ears/Nose/Throat: negative  Respiratory: No shortness of breath, dyspnea on exertion, cough, or hemoptysis  Cardiovascular: negative  Gastrointestinal: negative  Genitourinary: negative  Musculoskeletal: negative  Neurologic: negative  Psychiatric: negative  Hematologic/Lymphatic/Immunologic: negative  Endocrine: negative    Objective:     BP (!) 151/96  Pulse 69  SpO2 97%  Eyes: Conjunctivae/lids: Normal   ENT: Lips:  Normal  MSK: Normal  Pulm: Breathing Normal  Neuro/Psych: Orientation: Normal; Mood/Affect: Normal, NAD, WDWN  Following areas examined: Scalp, face, eyelids, lips, neck, chest, abdomen, back, and R&L upper  lower extremities. Pt wearing pants and shoes.   Findings:  Pink WD firm nontender smooth papule on left inferior occipital scalp  Brown, stuck-on scaly appearing papule on left inferior neck  Light brown pedunculated papules at anterior and lateral aspects of inferior neck  Excoriated smooth macule on right mid antihelix  Flaking and mild excoriated skin of superior back      Assessment and Plan:  1) Neoplasm of uncertain behavior  Prurigo nodularis dbt KA  TANGENTIAL EXCISION:  After consent, anesthesia with LEC and prep, tangential excision performed.  No complications and routine wound care.   May grow back and will get a scar. Based on lesion type may need to completely remove lesion.  If biopsy proven PN and area regrows can do Kenalog injections. Disc D/C picking.   2) seborrheic keratosis and acrochordon  I discussed the specifics of tumor, prognosis, and genetics of benign lesions.  I explained that treatment of these lesions would be purely cosmetic and not medically neccessary.  I discussed with patient different removal options including excision, cryotherapy, cautery and /or laser.      3) Excoriation     Pt states he may have had his hear nicked  with an electric razor yesterday. Disc biopsy vs W/M. Pt elects to watch and monitor. If no resolution in a few weeks follow up for further evaluation.    Signs and Symptoms of non-melanoma skin cancer and ABCDEs of melanoma reviewed with patient. Patient encouraged to perform monthly self skin exams including checking nails for any changes. UV precautions reviewed with patient.       4) xerosis    Proper skin care from [facility]:    -Eliminate harsh soaps as they strip the natural oils from the skin, often resulting in dry itchy skin ( i.e. Dial, Zest, [place] Spring)  -Use mild soaps such as Cetaphil or Dove Sensitive Skin in the shower. You do not need to use soap on arms, legs, and trunk every time you shower unless visibly soiled.   -Avoid hot or cold showers.  -After showering, lightly dry off and apply moisturizing within 2-3 minutes. This will help trap moisture in the skin.   -Aggressive use of a moisturizer at least 1-2 times a day to the entire body (including -Vanicream, Cetaphil, Aquaphor or Cerave) and moisturize hands after every washing.  -We recommend using moisturizers that come in a tub that needs to be scooped out, not a pump. This has more of an oil base. It will hold moisture in your skin much better than a water base moisturizer. The above recommended are non-pore clogging.       Follow up in yearly skin  exams      Again, thank you for allowing me to participate in the care of your patient.        Sincerely,        Yana Castillo PA-C

## 2018-01-16 NOTE — PATIENT INSTRUCTIONS
Wound Care Instructions     FOR SUPERFICIAL WOUNDS     [facility] [phone]    [facility] [phone]                       AFTER 24 HOURS YOU SHOULD REMOVE THE BANDAGE AND BEGIN DAILY DRESSING CHANGES AS FOLLOWS:     1) Remove Dressing.     2) Clean and dry the area with tap water using a Q-tip or sterile gauze pad.     3) Apply Vaseline, Aquaphor, Polysporin ointment or Bacitracin ointment over entire wound.  Do NOT use Neosporin ointment.     4) Cover the wound with a band-aid, or a sterile non-stick gauze pad and micropore paper tape      REPEAT THESE INSTRUCTIONS AT LEAST ONCE A DAY UNTIL THE WOUND HAS COMPLETELY HEALED.    It is an old wives tale that a wound heals better when it is exposed to air and allowed to dry out. The wound will heal faster with a better cosmetic result if it is kept moist with ointment and covered with a bandage.    **Do not let the wound dry out.**      Supplies Needed:      *Cotton tipped applicators (Q-tips)    *Polysporin Ointment or Bacitracin Ointment (NOT NEOSPORIN)    *Band-aids or non-stick gauze pads and micropore paper tape.      PATIENT INFORMATION:    During the healing process you will notice a number of changes. All wounds develop a small halo of redness surrounding the wound.  This means healing is occurring. Severe itching with extensive redness usually indicates sensitivity to the ointment or bandage tape used to dress the wound.  You should call our office if this develops.      Swelling  and/or discoloration around your surgical site is common, particularly when performed around the eye.    All wounds normally drain.  The larger the wound the more drainage there will be.  After 7-10 days, you will notice the wound beginning to shrink and new skin will begin to grow.  The wound is healed when you can see skin has formed over the entire area.  A healed wound has a healthy, shiny look to the surface and is red to dark pink in color  to normalize.  Wounds may take approximately 4-6 weeks to heal.  Larger wounds may take 6-8 weeks.  After the wound is healed you may discontinue dressing changes.    You may experience a sensation of tightness as your wound heals. This is normal and will gradually subside.    Your healed wound may be sensitive to temperature changes. This sensitivity improves with time, but if you re having a lot of discomfort, try to avoid temperature extremes.    Patients frequently experience itching after their wound appears to have healed because of the continue healing under the skin.  Plain Vaseline will help relieve the itching.        POSSIBLE COMPLICATIONS    BLEEDIN. Leave the bandage in place.  2. Use tightly rolled up gauze or a cloth to apply direct pressure over the bandage for 30  minutes.  3. Reapply pressure for an additional 30 minutes if necessary  4. Use additional gauze and tape to maintain pressure once the bleeding has stopped.    Proper skin care from Dr. Reilly- [facility]     Eliminate harsh soaps, i.e. Dial, Zest, Geetha Spring;   Use mild soaps such as Cetaphil or Dove Sensitive Skin   Avoid hot or cold showers   After showering, lightly dry off.    Aggressive use of a moisturizer (including Vanicream, Cetaphil, Aquaphor or Cerave)   We recommend using a tub that needs to be scooped out, not a pump. This has more of an oil base. It will hold moisture in your skin much better than a water base moisturizer. The ones recommended are non- pore clogging.       If you have any questions call [phone] and follow the prompts to Dr. Reilly's office.

## 2018-01-16 NOTE — NURSING NOTE
Chief Complaint   Patient presents with     Skin Check     neck left        Initial BP (!) 151/96  Pulse 69  SpO2 97% Estimated body mass index is 29.29 kg/(m^2) as calculated from the following:    Height as of 12/1/17: 1.829 m (6').    Weight as of 12/1/17: 98 kg (216 lb).  Medication Reconciliation: complete

## 2018-01-16 NOTE — MR AVS SNAPSHOT
After Visit Summary   1/16/2018    Lev Cabral    MRN: [id number]           Patient Information     Date Of Birth          1955        Visit Information        Provider Department      1/16/2018 2:00 PM Yana Castillo PA-C [facility]        Today's Diagnoses     Neoplasm of uncertain behavior of scalp    -  1      Care Instructions          Wound Care Instructions     FOR SUPERFICIAL WOUNDS     [facility] [phone]    [facility] [phone]                       AFTER 24 HOURS YOU SHOULD REMOVE THE BANDAGE AND BEGIN DAILY DRESSING CHANGES AS FOLLOWS:     1) Remove Dressing.     2) Clean and dry the area with tap water using a Q-tip or sterile gauze pad.     3) Apply Vaseline, Aquaphor, Polysporin ointment or Bacitracin ointment over entire wound.  Do NOT use Neosporin ointment.     4) Cover the wound with a band-aid, or a sterile non-stick gauze pad and micropore paper tape      REPEAT THESE INSTRUCTIONS AT LEAST ONCE A DAY UNTIL THE WOUND HAS COMPLETELY HEALED.    It is an old wives tale that a wound heals better when it is exposed to air and allowed to dry out. The wound will heal faster with a better cosmetic result if it is kept moist with ointment and covered with a bandage.    **Do not let the wound dry out.**      Supplies Needed:      *Cotton tipped applicators (Q-tips)    *Polysporin Ointment or Bacitracin Ointment (NOT NEOSPORIN)    *Band-aids or non-stick gauze pads and micropore paper tape.      PATIENT INFORMATION:    During the healing process you will notice a number of changes. All wounds develop a small halo of redness surrounding the wound.  This means healing is occurring. Severe itching with extensive redness usually indicates sensitivity to the ointment or bandage tape used to dress the wound.  You should call our office if this develops.      Swelling  and/or discoloration around your surgical site is  common, particularly when performed around the eye.    All wounds normally drain.  The larger the wound the more drainage there will be.  After 7-10 days, you will notice the wound beginning to shrink and new skin will begin to grow.  The wound is healed when you can see skin has formed over the entire area.  A healed wound has a healthy, shiny look to the surface and is red to dark pink in color to normalize.  Wounds may take approximately 4-6 weeks to heal.  Larger wounds may take 6-8 weeks.  After the wound is healed you may discontinue dressing changes.    You may experience a sensation of tightness as your wound heals. This is normal and will gradually subside.    Your healed wound may be sensitive to temperature changes. This sensitivity improves with time, but if you re having a lot of discomfort, try to avoid temperature extremes.    Patients frequently experience itching after their wound appears to have healed because of the continue healing under the skin.  Plain Vaseline will help relieve the itching.        POSSIBLE COMPLICATIONS    BLEEDIN. Leave the bandage in place.  2. Use tightly rolled up gauze or a cloth to apply direct pressure over the bandage for 30  minutes.  3. Reapply pressure for an additional 30 minutes if necessary  4. Use additional gauze and tape to maintain pressure once the bleeding has stopped.    Proper skin care from Dr. Reilly- [facility]     Eliminate harsh soaps, i.e. Dial, Zest, Vietnamese Spring;   Use mild soaps such as Cetaphil or Dove Sensitive Skin   Avoid hot or cold showers   After showering, lightly dry off.    Aggressive use of a moisturizer (including Vanicream, Cetaphil, Aquaphor or Cerave)   We recommend using a tub that needs to be scooped out, not a pump. This has more of an oil base. It will hold moisture in your skin much better than a water base moisturizer. The ones recommended are non- pore clogging.       If you have any questions call  "[phone] and follow the prompts to Dr. Reilly's office.           Follow-ups after your visit        Who to contact     If you have questions or need follow up information about today's clinic visit or your schedule please contact [facility] directly at [phone].  Normal or non-critical lab and imaging results will be communicated to you by MyChart, letter or phone within 4 business days after the clinic has received the results. If you do not hear from us within 7 days, please contact the clinic through [employer] or phone. If you have a critical or abnormal lab result, we will notify you by phone as soon as possible.  Submit refill requests through [employer] or call your pharmacy and they will forward the refill request to us. Please allow 3 business days for your refill to be completed.          Additional Information About Your Visit        MyChart Information     [employer] lets you send messages to your doctor, view your test results, renew your prescriptions, schedule appointments and more. To sign up, go to [email or web address] . Click on \"Log in\" on the left side of the screen, which will take you to the Welcome page. Then click on \"Sign up Now\" on the right side of the page.     You will be asked to enter the access code listed below, as well as some personal information. Please follow the directions to create your username and password.     Your access code is: [email or web address]  Expires: 2018  3:47 PM     Your access code will  in 90 days. If you need help or a new code, please call your [facility] clinic or [phone].        Care EveryWhere ID     This is your Care EveryWhere ID. This could be used by other organizations to access your [facility] medical records  [id number]        Your Vitals Were     Pulse Pulse Oximetry                69 97%           Blood Pressure from Last 3 Encounters:   18 (!) 151/96   17 128/84   17 116/82    Weight from Last " 3 Encounters:   12/01/17 98 kg (216 lb)   11/21/17 97.8 kg (215 lb 8 oz)   07/21/17 95.2 kg (209 lb 12.8 oz)              Today, you had the following     No orders found for display       Primary Care Provider Office Phone # Fax #    Cr Corley PA-C [phone]       [address] [address] [facility] [address]        Equal Access to Services     [facility] : Hadii aad ku hadasho Soomaali, waaxda luqadaha, qaybta kaalmada adeegyada, waxay idiin hayaan adeeg kharash la'aan . So [facility] [phone].    ATENCIÓN: Si habla español, tiene a henry disposición servicios gratuitos de asistencia lingüística. Llame al [phone].    We comply with applicable federal civil rights laws and [place] laws. We do not discriminate on the basis of race, color, national origin, age, disability, sex, sexual orientation, or gender identity.            Thank you!     Thank you for choosing [facility]  for your care. Our goal is always to provide you with excellent care. Hearing back from our patients is one way we can continue to improve our services. Please take a few minutes to complete the written survey that you may receive in the mail after your visit with us. Thank you!             Your Updated Medication List - Protect others around you: Learn how to safely use, store and throw away your medicines at www.disposemymeds.org.          This list is accurate as of: 1/16/18  2:16 PM.  Always use your most recent med list.                   Brand Name Dispense Instructions for use Diagnosis    HYDROmorphone 2 MG tablet    DILAUDID    40 tablet    Take 1-2 tablets (2-4 mg) by mouth every 3 hours as needed for moderate to severe pain    Mass of chest wall, left       psyllium 58.6 % Powd    METAMUCIL     Take 2 Tablespoonful by mouth daily        VITAMIN D (CHOLECALCIFEROL) PO      Take 1-2 tablets by mouth daily

## 2018-01-18 LAB — COPATH REPORT: NORMAL

## 2018-03-09 ENCOUNTER — HOSPITAL ENCOUNTER (OUTPATIENT)
Dept: CT IMAGING | Facility: CLINIC | Age: 63
Discharge: HOME | End: 2018-03-09
Attending: THORACIC SURGERY (CARDIOTHORACIC VASCULAR SURGERY) | Admitting: THORACIC SURGERY (CARDIOTHORACIC VASCULAR SURGERY)
Payer: COMMERCIAL

## 2018-03-09 DIAGNOSIS — C41.9 CHONDROSARCOMA (H): ICD-10-CM

## 2018-03-09 PROCEDURE — 71250 CT THORAX DX C-: CPT

## 2018-06-13 ENCOUNTER — HOSPITAL ENCOUNTER (OUTPATIENT)
Dept: CT IMAGING | Facility: CLINIC | Age: 63
Discharge: HOME | End: 2018-06-13
Attending: THORACIC SURGERY (CARDIOTHORACIC VASCULAR SURGERY) | Admitting: THORACIC SURGERY (CARDIOTHORACIC VASCULAR SURGERY)
Payer: COMMERCIAL

## 2018-06-13 DIAGNOSIS — R22.2 MASS OF CHEST WALL, LEFT: ICD-10-CM

## 2018-06-13 PROCEDURE — 71250 CT THORAX DX C-: CPT

## 2018-09-24 ENCOUNTER — TELEPHONE (OUTPATIENT)
Dept: FAMILY MEDICINE | Facility: CLINIC | Age: 63
End: 2018-09-24

## 2018-09-24 NOTE — TELEPHONE ENCOUNTER
Reason for Call:  Other call back    Detailed comments: pt is calling and has a question for Wilder Corley.     Phone Number Patient can be reached at: Home number on file [phone] (home)    Best Time: any    Can we leave a detailed message on this number? YES    Call taken on 9/24/2018 at 3:20 PM by Ruchi Hoffman

## 2018-09-25 NOTE — TELEPHONE ENCOUNTER
Called patient and spoke with him. Patient stated that a while back he discussed with provider about starting medication for ED. Patient talked with our pharmacy and they informed him of a generic medication that they carry that is less expensive.     Patient is wondering if this is something that he can be prescribed? Or does he need an appt to discuss first. Patient was prescribed medication in the past but never filled due to cost.   Grazyna Montano MA

## 2018-09-27 ENCOUNTER — OFFICE VISIT (OUTPATIENT)
Dept: FAMILY MEDICINE | Facility: CLINIC | Age: 63
End: 2018-09-27
Payer: COMMERCIAL

## 2018-09-27 VITALS
BODY MASS INDEX: 29.4 KG/M2 | HEIGHT: 72 IN | SYSTOLIC BLOOD PRESSURE: 120 MMHG | WEIGHT: 217.1 LBS | DIASTOLIC BLOOD PRESSURE: 80 MMHG | TEMPERATURE: 97.8 F | RESPIRATION RATE: 14 BRPM | OXYGEN SATURATION: 95 % | HEART RATE: 80 BPM

## 2018-09-27 DIAGNOSIS — Z23 NEED FOR PROPHYLACTIC VACCINATION AND INOCULATION AGAINST INFLUENZA: ICD-10-CM

## 2018-09-27 DIAGNOSIS — N52.9 ERECTILE DYSFUNCTION, UNSPECIFIED ERECTILE DYSFUNCTION TYPE: Primary | ICD-10-CM

## 2018-09-27 PROCEDURE — 99213 OFFICE O/P EST LOW 20 MIN: CPT | Performed by: PHYSICIAN ASSISTANT

## 2018-09-27 RX ORDER — SILDENAFIL CITRATE 20 MG/1
40-100 TABLET ORAL DAILY
Qty: 30 TABLET | Refills: 1 | Status: SHIPPED | OUTPATIENT
Start: 2018-09-27 | End: 2019-01-16

## 2018-09-27 NOTE — PROGRESS NOTES
SUBJECTIVE:   Lev Cabral is a 62 year old male who presents to clinic today for the following health issues:    Patient is here today to discuss medication for ED.  He has been experiencing this problem for a few years  Seems to be increasing lately  In the past he has been hesitant to pay for the medications  Also has hx of peyronie    Problem list and histories reviewed & adjusted, as indicated.  Additional history: as documented    Patient Active Problem List   Diagnosis     CARDIOVASCULAR SCREENING; LDL GOAL LESS THAN 130     Family history of diabetes mellitus     Advanced directives, counseling/discussion     Cervicalgia     Acute reaction to stress     Peyronie's disease     Mass of chest wall, left     Past Surgical History:   Procedure Laterality Date     APPENDECTOMY       COLONOSCOPY  2009    Dr. Oilva     COLONOSCOPY N/A 2/9/2017    Procedure: COLONOSCOPY;  Surgeon: Amos Loving MD;  Location:  GI     ENT SURGERY      tonsils     ORTHOPEDIC SURGERY  1999    neck fusion- 3 level     ORTHOPEDIC SURGERY Left     left knee arthroscopic     REPAIR CHEST WALL N/A 12/1/2017    Procedure: REPAIR CHEST WALL;  RESECTION OF LEFT ANTERIOR CHEST WALL MASS AND RECONSTRUCTION WITH GOR-TIM SOFT TISSUE PATCH, PRIMARY REPAIR OF DIAPHRAGM;  Surgeon: Faizan Sterling MD;  Location:  OR       Social History   Substance Use Topics     Smoking status: Never Smoker     Smokeless tobacco: Never Used     Alcohol use No     Family History   Problem Relation Age of Onset     Cancer Father      brain tumor     Diabetes Father      Diabetes Mother      Type 2     Colon Cancer No family hx of            Reviewed and updated as needed this visit by clinical staff       Reviewed and updated as needed this visit by Provider         ROS:  Constitutional, HEENT, cardiovascular, pulmonary, gi and gu systems are negative, except as otherwise noted.    OBJECTIVE:     /80  Pulse 80  Temp 97.8  F (36.6  C)  (Tympanic)  Resp 14  Ht 6' (1.829 m)  Wt 217 lb 1.6 oz (98.5 kg)  SpO2 95%  BMI 29.44 kg/m2  Body mass index is 29.44 kg/(m^2).  GENERAL: healthy, alert and no distress   (male): penis normal with slight left curvature;  PSYCH: mentation appears normal, affect normal/bright    Diagnostic Test Results:  none     ASSESSMENT/PLAN:   1. Erectile dysfunction, unspecified erectile dysfunction type  - sildenafil (REVATIO) 20 MG tablet; Take 2-5 tablets ( mg) by mouth daily Take 2-5 tablet ( mg) by mouth orally 30 minutes to 4 hours before sex  Dispense: 30 tablet; Refill: 1    2. Need for prophylactic vaccination and inoculation against influenza  Will return in October.    Cr Corley PA-C  [facility]

## 2018-09-27 NOTE — MR AVS SNAPSHOT
After Visit Summary   9/27/2018    Lev Cabral    MRN: [id number]           Patient Information     Date Of Birth          1955        Visit Information        Provider Department      9/27/2018 9:40 AM Cr Corley PA-C [facility]        Today's Diagnoses     Erectile dysfunction, unspecified erectile dysfunction type    -  1    Need for prophylactic vaccination and inoculation against influenza           Follow-ups after your visit        Follow-up notes from your care team     Return in about 1 year (around 9/27/2019) for Physical Exam.      Who to contact     If you have questions or need follow up information about today's clinic visit or your schedule please contact [facility] directly at [phone].  Normal or non-critical lab and imaging results will be communicated to you by MyChart, letter or phone within 4 business days after the clinic has received the results. If you do not hear from us within 7 days, please contact the clinic through MyChart or phone. If you have a critical or abnormal lab result, we will notify you by phone as soon as possible.  Submit refill requests through [employer] or call your pharmacy and they will forward the refill request to us. Please allow 3 business days for your refill to be completed.          Additional Information About Your Visit        Care EveryWhere ID     This is your Care EveryWhere ID. This could be used by other organizations to access your [facility] medical records  [id number]        Your Vitals Were     Pulse Temperature Respirations Height Pulse Oximetry BMI (Body Mass Index)    80 97.8  F (36.6  C) (Tympanic) 14 6' (1.829 m) 95% 29.44 kg/m2       Blood Pressure from Last 3 Encounters:   09/27/18 120/80   01/16/18 (!) 151/96   12/03/17 128/84    Weight from Last 3 Encounters:   09/27/18 217 lb 1.6 oz (98.5 kg)   12/01/17 216 lb (98 kg)   11/21/17 215 lb 8 oz (97.8 kg)              Today, you  had the following     No orders found for display         Today's Medication Changes          These changes are accurate as of 9/27/18 10:17 AM.  If you have any questions, ask your nurse or doctor.               Start taking these medicines.        Dose/Directions    sildenafil 20 MG tablet   Commonly known as:  REVATIO   Used for:  Erectile dysfunction, unspecified erectile dysfunction type   Started by:  Cr Corley PA-C        Dose:   mg   Take 2-5 tablets ( mg) by mouth daily Take 2-5 tablet ( mg) by mouth orally 30 minutes to 4 hours before sex   Quantity:  30 tablet   Refills:  1         Stop taking these medicines if you haven't already. Please contact your care team if you have questions.     HYDROmorphone 2 MG tablet   Commonly known as:  DILAUDID   Stopped by:  Cr Corley PA-C                Where to get your medicines      These [place] [facility] [place] [address]  [address] [place] [address]     Phone:  [phone]     sildenafil 20 MG tablet                Primary Care Provider Office Phone # Fax #    Cr Corley PA-C [phone] [address]  [place] [address]        Equal Access to Services     ERMA HANSEN AH: Hadii aad ku hadasho Soomaali, waaxda luqadaha, qaybta kaalmada adeegyada, waxay idiin hayaan bette allen . So [facility] [phone].    ATENCIÓN: Si habla español, tiene a henry disposición servicios gratuitos de asistencia lingüística. Llame al [phone].    We comply with applicable federal civil rights laws and [place] laws. We do not discriminate on the basis of race, color, national origin, age, disability, sex, sexual orientation, or gender identity.            Thank you!     Thank you for choosing [facility]  for your care. Our goal is always to provide you with excellent care. Hearing back from our patients is one way we  can continue to improve our services. Please take a few minutes to complete the written survey that you may receive in the mail after your visit with us. Thank you!             Your Updated Medication List - Protect others around you: Learn how to safely use, store and throw away your medicines at www.disposemymeds.org.          This list is accurate as of 9/27/18 10:17 AM.  Always use your most recent med list.                   Brand Name Dispense Instructions for use Diagnosis    psyllium 58.6 % Powd    METAMUCIL     Take 2 Tablespoonful by mouth daily        sildenafil 20 MG tablet    REVATIO    30 tablet    Take 2-5 tablets ( mg) by mouth daily Take 2-5 tablet ( mg) by mouth orally 30 minutes to 4 hours before sex    Erectile dysfunction, unspecified erectile dysfunction type       VITAMIN D (CHOLECALCIFEROL) PO      Take 1-2 tablets by mouth daily

## 2018-11-21 ENCOUNTER — OFFICE VISIT (OUTPATIENT)
Dept: FAMILY MEDICINE | Facility: CLINIC | Age: 63
End: 2018-11-21
Payer: COMMERCIAL

## 2018-11-21 VITALS
TEMPERATURE: 97.9 F | SYSTOLIC BLOOD PRESSURE: 128 MMHG | HEIGHT: 71 IN | RESPIRATION RATE: 16 BRPM | DIASTOLIC BLOOD PRESSURE: 76 MMHG | HEART RATE: 57 BPM | WEIGHT: 218.9 LBS | OXYGEN SATURATION: 96 % | BODY MASS INDEX: 30.65 KG/M2

## 2018-11-21 DIAGNOSIS — J20.9 ACUTE BRONCHITIS, UNSPECIFIED ORGANISM: Primary | ICD-10-CM

## 2018-11-21 PROCEDURE — 99213 OFFICE O/P EST LOW 20 MIN: CPT | Performed by: PHYSICIAN ASSISTANT

## 2018-11-21 RX ORDER — AZITHROMYCIN 250 MG/1
TABLET, FILM COATED ORAL
Qty: 6 TABLET | Refills: 0 | Status: SHIPPED | OUTPATIENT
Start: 2018-11-21 | End: 2018-11-27

## 2018-11-21 RX ORDER — BENZONATATE 200 MG/1
200 CAPSULE ORAL 3 TIMES DAILY PRN
Qty: 21 CAPSULE | Refills: 0 | Status: SHIPPED | OUTPATIENT
Start: 2018-11-21 | End: 2020-01-17

## 2018-11-21 NOTE — PROGRESS NOTES
SUBJECTIVE:   Lev Cabral is a 63 year old male who presents to clinic today for the following health issues:      RESPIRATORY SYMPTOMS      Duration: 4 weeks    Description  nasal congestion, sore throat, facial pain/pressure and cough, PND, fatigue    Severity: mild-severe, fluctuating symptoms    Accompanying signs and symptoms: When he coughs he gets a shooting pain through left buttocks/hip    History (predisposing factors):  none    Precipitating or alleviating factors: None    Therapies tried and outcome:  Flonase, afrin nasal psray, cough drops, cough syrup; nothing is really helping    Patient is here today for evaluation of cold symptoms that will not go away, ongoing for 4-5 weeks  He admits to nasal congestion, purulent discharge, sore throat and cough initially  Things improved some but he still productive cough with drainage  Denies fever, headache, ear pain  Has a scratchy throat at times, feels run down  Was seen at a [facility], dx viral illness  Was given Afrin nasal spray and flonase which didn't help  Also notes that when he coughs he gets left upper leg pain    Problem list and histories reviewed & adjusted, as indicated.  Additional history: as documented    Patient Active Problem List   Diagnosis     CARDIOVASCULAR SCREENING; LDL GOAL LESS THAN 130     Family history of diabetes mellitus     Advanced directives, counseling/discussion     Cervicalgia     Acute reaction to stress     Peyronie's disease     Mass of chest wall, left     Past Surgical History:   Procedure Laterality Date     APPENDECTOMY       COLONOSCOPY  2009    Dr. Oliva     COLONOSCOPY N/A 2/9/2017    Procedure: COLONOSCOPY;  Surgeon: Amos Loving MD;  Location:  GI     ENT SURGERY      tonsils     ORTHOPEDIC SURGERY  1999    neck fusion- 3 level     ORTHOPEDIC SURGERY Left     left knee arthroscopic     REPAIR CHEST WALL N/A 12/1/2017    Procedure: REPAIR CHEST WALL;  RESECTION OF LEFT ANTERIOR CHEST WALL  "MASS AND RECONSTRUCTION WITH GOR-TIM SOFT TISSUE PATCH, PRIMARY REPAIR OF DIAPHRAGM;  Surgeon: Faizan Sterling MD;  Location:  OR       Social History   Substance Use Topics     Smoking status: Never Smoker     Smokeless tobacco: Never Used     Alcohol use No     Family History   Problem Relation Age of Onset     Cancer Father      brain tumor     Diabetes Father      Diabetes Mother      Type 2     Colon Cancer No family hx of          Current Outpatient Prescriptions   Medication Sig Dispense Refill     azithromycin (ZITHROMAX) 250 MG tablet Two tablets first day, then one tablet daily for four days. 6 tablet 0     benzonatate (TESSALON) 200 MG capsule Take 1 capsule (200 mg) by mouth 3 times daily as needed for cough 21 capsule 0     psyllium (METAMUCIL) 58.6 % POWD Take 2 Tablespoonful by mouth daily       sildenafil (REVATIO) 20 MG tablet Take 2-5 tablets ( mg) by mouth daily Take 2-5 tablet ( mg) by mouth orally 30 minutes to 4 hours before sex 30 tablet 1     VITAMIN D, CHOLECALCIFEROL, PO Take 1-2 tablets by mouth daily       No Known Allergies    Reviewed and updated as needed this visit by clinical staff  Tobacco  Allergies  Meds  Med Hx  Surg Hx  Fam Hx  Soc Hx      Reviewed and updated as needed this visit by Provider         ROS:  Constitutional, HEENT, cardiovascular, pulmonary, gi and gu systems are negative, except as otherwise noted.    OBJECTIVE:     /76 (BP Location: Right arm, Patient Position: Chair, Cuff Size: Adult Large)  Pulse 57  Temp 97.9  F (36.6  C) (Oral)  Resp 16  Ht 5' 11.25\" (1.81 m)  Wt 218 lb 14.4 oz (99.3 kg)  SpO2 96%  BMI 30.32 kg/m2  Body mass index is 30.32 kg/(m^2).  GENERAL: healthy, alert and no distress  EYES: Eyes grossly normal to inspection, PERRL and conjunctivae and sclerae normal  HENT: ear canals and TM's normal, nose and mouth without ulcers or lesions  NECK: no adenopathy, no asymmetry, masses, or scars and thyroid normal " to palpation  RESP: lungs clear to auscultation - no rales, rhonchi or wheezes  CV: regular rate and rhythm, normal S1 S2, no S3 or S4, no murmur, click or rub, no peripheral edema and peripheral pulses strong  MS: no gross musculoskeletal defects noted, no edema    Diagnostic Test Results:  none     ASSESSMENT/PLAN:             1. Acute bronchitis, unspecified organism  New problem, ongoing for 5 weeks, not fully resolved though slightly improved.  Will treat with Z elizabeth for 5 days, recommend rest, fluids and OTCs.  F/U if symptoms worsen or do not improve.  - azithromycin (ZITHROMAX) 250 MG tablet; Two tablets first day, then one tablet daily for four days.  Dispense: 6 tablet; Refill: 0  - benzonatate (TESSALON) 200 MG capsule; Take 1 capsule (200 mg) by mouth 3 times daily as needed for cough  Dispense: 21 capsule; Refill: 0    Risks, benefits and alternatives were discussed with patient. Agreeable to the plan of care.      Francisca Lopez PA-C  [facility]

## 2018-11-21 NOTE — MR AVS SNAPSHOT
After Visit Summary   11/21/2018    Lev Cabral    MRN: [id number]           Patient Information     Date Of Birth          1955        Visit Information        Provider Department      11/21/2018 1:10 PM Francisca Lopez PA-C [facility]        Today's Diagnoses     Acute bronchitis, unspecified organism    -  1       Follow-ups after your visit        Follow-up notes from your care team     Return in about 1 week (around 11/28/2018) for If symptoms worsen or fail to improve.      Your next 10 appointments already scheduled     Dec 03, 2018  7:45 AM CST   CT CHEST W/O CONTRAST with [address] ([facility])    [address] [facility] [address]  [facility] [address] [phone]           How do I prepare for my exam? (Food and drink instructions) No Food and Drink Restrictions.  How do I prepare for my exam? (Other instructions) You do not need to do anything special to prepare for this exam. For a sinus scan: Use your nose spray (nasal decongestant spray) as directed.  What should I wear: Please wear loose clothing, such as a sweat suit or jogging clothes. Avoid snaps, zippers and other metal. We may ask you to undress and put on a hospital gown.  How long does the exam take: Most scans take less than 20 minutes.  What should I bring: Please bring any scans or X-rays taken at other hospitals, if similar tests were done. Also bring a list of your medicines, including vitamins, minerals and over-the-counter drugs. It is safest to leave personal items at home.  Do I need a : No  is needed.  What do I need to tell my doctor? Be sure to tell your doctor: * If you have any allergies. * If there s any chance you are pregnant. * If you are breastfeeding.  What should I do after the exam: No restrictions, You may resume normal activities.  What is this test: A CT (computed tomography) scan is a series of  "pictures that allows us to look inside your body. The scanner creates images of the body in cross sections, much like slices of bread. This helps us see any problems more clearly.  Who should I call with questions: If you have any questions, please call the Imaging Department where you will have your exam. Directions, parking instructions, and other information is available on our website, [email or web address].              Who to contact     If you have questions or need follow up information about today's clinic visit or your schedule please contact [facility] directly at [phone].  Normal or non-critical lab and imaging results will be communicated to you by MyChart, letter or phone within 4 business days after the clinic has received the results. If you do not hear from us within 7 days, please contact the clinic through MyChart or phone. If you have a critical or abnormal lab result, we will notify you by phone as soon as possible.  Submit refill requests through [employer] or call your pharmacy and they will forward the refill request to us. Please allow 3 business days for your refill to be completed.          Additional Information About Your Visit        Care EveryWhere ID     This is your Care EveryWhere ID. This could be used by other organizations to access your [place] medical records  [id number]        Your Vitals Were     Pulse Temperature Respirations Height Pulse Oximetry BMI (Body Mass Index)    57 97.9  F (36.6  C) (Oral) 16 5' 11.25\" (1.81 m) 96% 30.32 kg/m2       Blood Pressure from Last 3 Encounters:   11/21/18 128/76   09/27/18 120/80   01/16/18 (!) 151/96    Weight from Last 3 Encounters:   11/21/18 218 lb 14.4 oz (99.3 kg)   09/27/18 217 lb 1.6 oz (98.5 kg)   12/01/17 216 lb (98 kg)              Today, you had the following     No orders found for display         Today's Medication Changes          These changes are accurate as of 11/21/18  1:24 PM.  If you have any " questions, ask your nurse or doctor.               Start taking these medicines.        Dose/Directions    azithromycin 250 MG tablet   Commonly known as:  ZITHROMAX   Used for:  Acute bronchitis, unspecified organism   Started by:  Francisca Lopez PA-C        Two tablets first day, then one tablet daily for four days.   Quantity:  6 tablet   Refills:  0       benzonatate 200 MG capsule   Commonly known as:  TESSALON   Used for:  Acute bronchitis, unspecified organism   Started by:  Francisca Lopez PA-C        Dose:  200 mg   Take 1 capsule (200 mg) by mouth 3 times daily as needed for cough   Quantity:  21 capsule   Refills:  0            Where to get your medicines      These medications were sent to [facility] [address] [facility] [address] [facility] AT [place] [address] & [facility]  [address] [facility] [address]     Phone:  [phone]     azithromycin 250 MG tablet    benzonatate 200 MG capsule                Primary Care Provider Office Phone # Fax #    Cr Corley PA-C [phone] [address]  [facility] [address]        Equal Access to Services     [facility]: Hadii aad ku hadasho Soomaali, waaxda luqadaha, qaybta kaalmada adeegyada, waxay idiin hayaan ademarvel allen . So [facility] [place] [phone].    ATENCIÓN: Si habla español, tiene a henry disposición servicios gratuitos de asistencia lingüística. [facility] [phone].    We comply with applicable federal civil rights laws and [place] laws. We do not discriminate on the basis of race, color, national origin, age, disability, sex, sexual orientation, or gender identity.            Thank you!     Thank you for choosing [facility]  for your care. Our goal is always to provide you with excellent care. Hearing back from our patients is one way we can continue to improve our services. Please take a few minutes to complete the written survey that you may  receive in the mail after your visit with us. Thank you!             Your Updated Medication List - Protect others around you: Learn how to safely use, store and throw away your medicines at www.disposemymeds.org.          This list is accurate as of 11/21/18  1:24 PM.  Always use your most recent med list.                   Brand Name Dispense Instructions for use Diagnosis    azithromycin 250 MG tablet    ZITHROMAX    6 tablet    Two tablets first day, then one tablet daily for four days.    Acute bronchitis, unspecified organism       benzonatate 200 MG capsule    TESSALON    21 capsule    Take 1 capsule (200 mg) by mouth 3 times daily as needed for cough    Acute bronchitis, unspecified organism       psyllium 58.6 % Powd    METAMUCIL     Take 2 Tablespoonful by mouth daily        sildenafil 20 MG tablet    REVATIO    30 tablet    Take 2-5 tablets ( mg) by mouth daily Take 2-5 tablet ( mg) by mouth orally 30 minutes to 4 hours before sex    Erectile dysfunction, unspecified erectile dysfunction type       VITAMIN D (CHOLECALCIFEROL) PO      Take 1-2 tablets by mouth daily

## 2018-11-27 ENCOUNTER — OFFICE VISIT (OUTPATIENT)
Dept: FAMILY MEDICINE | Facility: CLINIC | Age: 63
End: 2018-11-27
Payer: COMMERCIAL

## 2018-11-27 VITALS
DIASTOLIC BLOOD PRESSURE: 86 MMHG | SYSTOLIC BLOOD PRESSURE: 122 MMHG | RESPIRATION RATE: 12 BRPM | HEART RATE: 67 BPM | OXYGEN SATURATION: 97 % | WEIGHT: 220.3 LBS | TEMPERATURE: 99.1 F | BODY MASS INDEX: 30.84 KG/M2 | HEIGHT: 71 IN

## 2018-11-27 DIAGNOSIS — J20.9 ACUTE BRONCHITIS, UNSPECIFIED ORGANISM: ICD-10-CM

## 2018-11-27 DIAGNOSIS — J32.9 RHINOSINUSITIS: Primary | ICD-10-CM

## 2018-11-27 PROCEDURE — 99213 OFFICE O/P EST LOW 20 MIN: CPT | Performed by: PHYSICIAN ASSISTANT

## 2018-11-27 RX ORDER — PREDNISONE 20 MG/1
20 TABLET ORAL DAILY
Qty: 5 TABLET | Refills: 0 | Status: SHIPPED | OUTPATIENT
Start: 2018-11-27 | End: 2020-01-17

## 2018-11-27 RX ORDER — CODEINE PHOSPHATE AND GUAIFENESIN 10; 100 MG/5ML; MG/5ML
1 SOLUTION ORAL AT BEDTIME
Qty: 120 ML | Refills: 0 | Status: SHIPPED | OUTPATIENT
Start: 2018-11-27 | End: 2020-01-17

## 2018-11-27 NOTE — PROGRESS NOTES
SUBJECTIVE:   Lev Cabral is a 63 year old male who presents to clinic today for the following health issues:    RESPIRATORY SYMPTOMS      Duration: Seen twice, last appt 11/21/18    Description  facial pain/pressure, cough and headache    Severity: moderate to severe     Accompanying signs and symptoms: thick mucus production, shooting pain down left leg/buttocks when coughing     History (predisposing factors):  none    Precipitating or alleviating factors: None    Therapies tried and outcome:  Z-pack, Tessalon, nothing is helping     -Patient notes feeling sick for the past several weeks  -symptoms started with congestion following raking leaves   -became very painful, irritated and purulent    -lots of pressure   -increased headache  -later developed cough, difficult to sleep because worsening at night  -cough causing some back pain  -[facility], dx with viral illness in late October  -Saw KO last week and started on zpack  -no improvement    Problem list and histories reviewed & adjusted, as indicated.  Additional history: as documented    Patient Active Problem List   Diagnosis     CARDIOVASCULAR SCREENING; LDL GOAL LESS THAN 130     Family history of diabetes mellitus     Advanced directives, counseling/discussion     Cervicalgia     Acute reaction to stress     Peyronie's disease     Mass of chest wall, left     Past Surgical History:   Procedure Laterality Date     APPENDECTOMY       COLONOSCOPY  2009    Dr. Oliva     COLONOSCOPY N/A 2/9/2017    Procedure: COLONOSCOPY;  Surgeon: Amos Loving MD;  Location:  GI     ENT SURGERY      tonsils     ORTHOPEDIC SURGERY  1999    neck fusion- 3 level     ORTHOPEDIC SURGERY Left     left knee arthroscopic     REPAIR CHEST WALL N/A 12/1/2017    Procedure: REPAIR CHEST WALL;  RESECTION OF LEFT ANTERIOR CHEST WALL MASS AND RECONSTRUCTION WITH GOR-TIM SOFT TISSUE PATCH, PRIMARY REPAIR OF DIAPHRAGM;  Surgeon: Faizan Sterling MD;  Location:   "OR       Social History   Substance Use Topics     Smoking status: Never Smoker     Smokeless tobacco: Never Used     Alcohol use No     Family History   Problem Relation Age of Onset     Cancer Father      brain tumor     Diabetes Father      Diabetes Mother      Type 2     Colon Cancer No family hx of            Reviewed and updated as needed this visit by clinical staff       Reviewed and updated as needed this visit by Provider         ROS:  Constitutional, HEENT, cardiovascular, pulmonary, gi and gu systems are negative, except as otherwise noted.    OBJECTIVE:     /86  Pulse 67  Temp 99.1  F (37.3  C) (Tympanic)  Resp 12  Ht 5' 11.25\" (1.81 m)  Wt 220 lb 4.8 oz (99.9 kg)  SpO2 97%  BMI 30.51 kg/m2  Body mass index is 30.51 kg/(m^2).  GENERAL: healthy, alert and no distress  HENT: normal cephalic/atraumatic, ear canals and TM's normal, nasal mucosa edematous , rhinorrhea thick, oropharynx raw and sinuses: maxillary tenderness on both sides  RESP: rhonchi in the upper lobes bilaterally; cough with expiratory wheeze; grossly clear to auscultation otherwise  CV: regular rate and rhythm, normal S1 S2, no S3 or S4, no murmur, click or rub, no peripheral edema and peripheral pulses strong  ABDOMEN: soft, nontender, no hepatosplenomegaly, no masses and bowel sounds normal  MS: No peripheral edema     Diagnostic Test Results:  none     ASSESSMENT/PLAN:   1. Rhinosinusitis  2. Acute bronchitis, unspecified organism  Alejo has a lot going on. He has had sinus symptoms now for a number of months with failure of flonase, afrin, other OTC and now limited improvement with zpack. He has just finished the macrolide and that should provide continued coverage. Unfortunately I think we need to add another abx to generate improved coverage for sinuses. With the cough and auscultory findings will add short burst of steroid and Rx for night time.   - amoxicillin-clavulanate (AUGMENTIN) 875-125 MG tablet; Take 1 tablet by " mouth 2 times daily  Dispense: 20 tablet; Refill: 0  - predniSONE (DELTASONE) 20 MG tablet; Take 1 tablet (20 mg) by mouth daily  Dispense: 5 tablet; Refill: 0  - guaiFENesin-codeine (ROBITUSSIN AC) 100-10 MG/5ML SOLN solution; Take 5 mLs by mouth At Bedtime  Dispense: 120 mL; Refill: 0    Cr Corley PA-C  [facility]

## 2018-11-27 NOTE — MR AVS SNAPSHOT
After Visit Summary   11/27/2018    Lev Cabral    MRN: [id number]           Patient Information     Date Of Birth          1955        Visit Information        Provider Department      11/27/2018 5:00 PM Cr Corley PA-C [facility]        Today's Diagnoses     Rhinosinusitis    -  1    Acute bronchitis, unspecified organism           Follow-ups after your visit        Follow-up notes from your care team     Return in about 1 week (around 12/4/2018).      Your next 10 appointments already scheduled     Dec 03, 2018  7:45 AM CST   CT CHEST W/O CONTRAST with [address] [facility] ([facility])    [address] [facility] [address]  [facility] [address] [phone]           How do I prepare for my exam? (Food and drink instructions) No Food and Drink Restrictions.  How do I prepare for my exam? (Other instructions) You do not need to do anything special to prepare for this exam. For a sinus scan: Use your nose spray (nasal decongestant spray) as directed.  What should I wear: Please wear loose clothing, such as a sweat suit or jogging clothes. Avoid snaps, zippers and other metal. We may ask you to undress and put on a hospital gown.  How long does the exam take: Most scans take less than 20 minutes.  What should I bring: Please bring any scans or X-rays taken at other hospitals, if similar tests were done. Also bring a list of your medicines, including vitamins, minerals and over-the-counter drugs. It is safest to leave personal items at home.  Do I need a : No  is needed.  What do I need to tell my doctor? Be sure to tell your doctor: * If you have any allergies. * If there s any chance you are pregnant. * If you are breastfeeding.  What should I do after the exam: No restrictions, You may resume normal activities.  What is this test: A CT (computed tomography) scan is a series of pictures that allows us to  "look inside your body. The scanner creates images of the body in cross sections, much like slices of bread. This helps us see any problems more clearly.  Who should I call with questions: If you have any questions, please call the Imaging Department where you will have your exam. Directions, parking instructions, and other information is available on our website, [email or web address].              Who to contact     If you have questions or need follow up information about today's clinic visit or your schedule please contact [facility] directly at [phone].  Normal or non-critical lab and imaging results will be communicated to you by MyChart, letter or phone within 4 business days after the clinic has received the results. If you do not hear from us within 7 days, please contact the clinic through MyChart or phone. If you have a critical or abnormal lab result, we will notify you by phone as soon as possible.  Submit refill requests through [employer] or call your pharmacy and they will forward the refill request to us. Please allow 3 business days for your refill to be completed.          Additional Information About Your Visit        Care EveryWhere ID     This is your Care EveryWhere ID. This could be used by other organizations to access your [place] medical records  [id number]        Your Vitals Were     Pulse Temperature Respirations Height Pulse Oximetry BMI (Body Mass Index)    67 99.1  F (37.3  C) (Tympanic) 12 5' 11.25\" (1.81 m) 97% 30.51 kg/m2       Blood Pressure from Last 3 Encounters:   11/27/18 122/86   11/21/18 128/76   09/27/18 120/80    Weight from Last 3 Encounters:   11/27/18 220 lb 4.8 oz (99.9 kg)   11/21/18 218 lb 14.4 oz (99.3 kg)   09/27/18 217 lb 1.6 oz (98.5 kg)              Today, you had the following     No orders found for display         Today's Medication Changes          These changes are accurate as of 11/27/18  5:52 PM.  If you have any questions, ask your " nurse or doctor.               Start taking these medicines.        Dose/Directions    amoxicillin-clavulanate 875-125 MG tablet   Commonly known as:  AUGMENTIN   Used for:  Rhinosinusitis   Started by:  Cr Corley PA-C        Dose:  1 tablet   Take 1 tablet by mouth 2 times daily   Quantity:  20 tablet   Refills:  0       guaiFENesin-codeine 100-10 MG/5ML Soln solution   Commonly known as:  ROBITUSSIN AC   Used for:  Acute bronchitis, unspecified organism   Started by:  Cr Corley PA-C        Dose:  1 tsp.   Take 5 mLs by mouth At Bedtime   Quantity:  120 mL   Refills:  0       predniSONE 20 MG tablet   Commonly known as:  DELTASONE   Used for:  Acute bronchitis, unspecified organism   Started by:  Cr Corley PA-C        Dose:  20 mg   Take 1 tablet (20 mg) by mouth daily   Quantity:  5 tablet   Refills:  0            Where to get your medicines      These medications were sent to [facility] [address] [place], [place] - [address] [address] AT [place] [address] & [facility]  [address] [facility] [address]     Phone:  [phone]     amoxicillin-clavulanate 875-125 MG tablet    predniSONE 20 MG tablet         Some of these will need a paper prescription and others can be bought over the counter.  Ask your nurse if you have questions.     Bring a paper prescription for each of these medications     guaiFENesin-codeine 100-10 MG/5ML Soln solution                Primary Care Provider Office Phone # Fax #    Cr Corley PA-C [phone] [address]  [facility] [address]        Equal Access to Services     [facility]: Hadii aad ku hadasho Soomaali, waaxda luqadaha, qaybta kaalmada adeegyadona, rachael idiin hayaan adeeg kharash la'aan . So [facility] [phone].    ATENCIÓN: Si habla español, tiene a henry disposición servicios gratuitos de asistencia lingüística. Llame al [phone].    We comply with applicable federal civil rights  laws and [place] laws. We do not discriminate on the basis of race, color, national origin, age, disability, sex, sexual orientation, or gender identity.            Thank you!     Thank you for choosing [facility]  for your care. Our goal is always to provide you with excellent care. Hearing back from our patients is one way we can continue to improve our services. Please take a few minutes to complete the written survey that you may receive in the mail after your visit with us. Thank you!             Your Updated Medication List - Protect others around you: Learn how to safely use, store and throw away your medicines at www.disposemymeds.org.          This list is accurate as of 11/27/18  5:52 PM.  Always use your most recent med list.                   Brand Name Dispense Instructions for use Diagnosis    amoxicillin-clavulanate 875-125 MG tablet    AUGMENTIN    20 tablet    Take 1 tablet by mouth 2 times daily    Rhinosinusitis       benzonatate 200 MG capsule    TESSALON    21 capsule    Take 1 capsule (200 mg) by mouth 3 times daily as needed for cough    Acute bronchitis, unspecified organism       guaiFENesin-codeine 100-10 MG/5ML Soln solution    ROBITUSSIN AC    120 mL    Take 5 mLs by mouth At Bedtime    Acute bronchitis, unspecified organism       predniSONE 20 MG tablet    DELTASONE    5 tablet    Take 1 tablet (20 mg) by mouth daily    Acute bronchitis, unspecified organism       psyllium 58.6 % powder    METAMUCIL/KONSYL     Take 2 Tablespoonful by mouth daily        sildenafil 20 MG tablet    REVATIO    30 tablet    Take 2-5 tablets ( mg) by mouth daily Take 2-5 tablet ( mg) by mouth orally 30 minutes to 4 hours before sex    Erectile dysfunction, unspecified erectile dysfunction type       VITAMIN D (CHOLECALCIFEROL) PO      Take 1-2 tablets by mouth daily

## 2018-12-03 ENCOUNTER — HOSPITAL ENCOUNTER (OUTPATIENT)
Dept: CT IMAGING | Facility: CLINIC | Age: 63
Discharge: HOME | End: 2018-12-03
Attending: THORACIC SURGERY (CARDIOTHORACIC VASCULAR SURGERY) | Admitting: THORACIC SURGERY (CARDIOTHORACIC VASCULAR SURGERY)
Payer: COMMERCIAL

## 2018-12-03 DIAGNOSIS — C41.9 CHONDROSARCOMA (H): ICD-10-CM

## 2018-12-03 PROCEDURE — 71250 CT THORAX DX C-: CPT

## 2019-01-16 DIAGNOSIS — N52.9 ERECTILE DYSFUNCTION, UNSPECIFIED ERECTILE DYSFUNCTION TYPE: ICD-10-CM

## 2019-01-17 RX ORDER — SILDENAFIL CITRATE 20 MG/1
TABLET ORAL
Qty: 30 TABLET | Refills: 3 | Status: SHIPPED | OUTPATIENT
Start: 2019-01-17 | End: 2020-01-17

## 2019-01-17 NOTE — TELEPHONE ENCOUNTER
Return in about 1 year (around 9/27/2019) for Physical Exam.   Prescription approved per [facility] Refill Protocol.  Jennifer Vigil RN  Message handled by Nurse Triage.

## 2019-01-17 NOTE — TELEPHONE ENCOUNTER
"Requested Prescriptions   Pending Prescriptions Disp Refills     sildenafil (REVATIO) 20 MG tablet [Pharmacy Med Name: SILDENAFIL CITRATE 20MG TABS]  Last Written Prescription Date:  9/27/18  Last Fill Quantity: 30,  # refills: 1   Last office visit: 11/27/2018 with prescribing provider:  Cr Corley PA-C    Future Office Visit:     30 tablet 1     Sig: TAKE 2 TO 5 TABLETS (40  MG) BY MOUTH 30 MINUTES TO 4 HOURS BEFORE SEX    Erectile Dysfuction Protocol Passed - 1/16/2019 10:22 AM       Passed - Absence of nitrates on medication list       Passed - Absence of Alpha Blockers on Med list       Passed - Recent (12 mo) or future (30 days) visit within the authorizing provider's specialty    Patient had office visit in the last 12 months or has a visit in the next 30 days with authorizing provider or within the authorizing provider's specialty.  See \"Patient Info\" tab in inbasket, or \"Choose Columns\" in Meds & Orders section of the refill encounter.             Passed - Medication is active on med list       Passed - Patient is age 18 or older          "

## 2019-04-12 ENCOUNTER — TELEPHONE (OUTPATIENT)
Dept: FAMILY MEDICINE | Facility: CLINIC | Age: 64
End: 2019-04-12

## 2019-04-12 DIAGNOSIS — N52.9 ERECTILE DYSFUNCTION, UNSPECIFIED ERECTILE DYSFUNCTION TYPE: Primary | ICD-10-CM

## 2019-04-12 NOTE — TELEPHONE ENCOUNTER
Prior Authorization Retail Medication Request    Medication/Dose: Sildenafil 20mg  ICD code (if different than what is on RX):    Previously Tried and Failed:    Rationale:      Insurance Name:  [employer]   Insurance ID:  [id number]      Pharmacy Information (if different than what is on RX)  Name:  [facility]  Phone:  [phone]

## 2019-04-18 NOTE — TELEPHONE ENCOUNTER
PA Initiation    Medication: Sildenafil 20mg  Insurance Company: [employer] - Phone [phone] Fax [phone]  Pharmacy Filling the Rx: [facility]  [place] - [address]  Filling Pharmacy Phone: [phone]  Filling Pharmacy Fax:    Start Date: 4/18/2019

## 2019-04-26 NOTE — TELEPHONE ENCOUNTER
PRIOR AUTHORIZATION DENIED    Medication: Sildenafil 20mg    Denial Date: 4/25/2019    Denial Rational: Received voicemail from NEEL Mitchell at [facility], stating that this medication is being denied and we should be receiving a faxed denial letter with more information and appeal information. He stated that this is denied for an 'off label policy denial' as this dose it is not supported for use for ED. He added that member can get six tablets per month of generic viagra, sildenafil 25mg, 50mg or 100mg without prior authorization.  Will document denial letter once received.     Appeal Information: # [phone] Fax [phone]      Will document denial letter once received.

## 2019-04-26 NOTE — TELEPHONE ENCOUNTER
Please let him know. See if he'd like to have the regular generic viagra prescribed. May be more expensive but not sure with his insurance.

## 2019-04-29 RX ORDER — SILDENAFIL 50 MG/1
50-100 TABLET, FILM COATED ORAL DAILY PRN
Qty: 30 TABLET | Refills: 1 | Status: SHIPPED | OUTPATIENT
Start: 2019-04-29 | End: 2021-08-31

## 2019-04-29 NOTE — TELEPHONE ENCOUNTER
The pt called back. He is aware that the PA has been denied and he is ok with having the generic prescribed for him. Please call him when the medication has been prescribed. Thank you.   Vickie Meza on 4/29/2019 at 10:54 AM

## 2019-06-03 ENCOUNTER — HOSPITAL ENCOUNTER (OUTPATIENT)
Dept: CT IMAGING | Facility: CLINIC | Age: 64
Discharge: HOME | End: 2019-06-03
Attending: THORACIC SURGERY (CARDIOTHORACIC VASCULAR SURGERY) | Admitting: THORACIC SURGERY (CARDIOTHORACIC VASCULAR SURGERY)
Payer: COMMERCIAL

## 2019-06-03 DIAGNOSIS — C41.9 CHONDROSARCOMA (H): ICD-10-CM

## 2019-06-03 PROCEDURE — 71250 CT THORAX DX C-: CPT

## 2019-07-26 ENCOUNTER — TRANSFERRED RECORDS (OUTPATIENT)
Dept: HEALTH INFORMATION MANAGEMENT | Facility: CLINIC | Age: 64
End: 2019-07-26

## 2019-12-02 ENCOUNTER — HOSPITAL ENCOUNTER (OUTPATIENT)
Dept: CT IMAGING | Facility: CLINIC | Age: 64
Discharge: HOME | End: 2019-12-02
Attending: THORACIC SURGERY (CARDIOTHORACIC VASCULAR SURGERY) | Admitting: THORACIC SURGERY (CARDIOTHORACIC VASCULAR SURGERY)
Payer: MEDICARE

## 2019-12-02 DIAGNOSIS — C41.3 CHONDROSARCOMA OF RIBS (H): ICD-10-CM

## 2019-12-02 PROCEDURE — 71250 CT THORAX DX C-: CPT

## 2020-01-16 NOTE — PROGRESS NOTES
"Subjective     Lev Cabral is a 64 year old male who presents to clinic today for the following health issues:    HPI   {SUPERLIST (Optional):817482}  {additonal problems for provider to add (Optional):149449}    {HIST REVIEW/ LINKS 2 (Optional):657203}    {Additional problems for the provider to add (optional):713553}  Reviewed and updated as needed this visit by Provider         Review of Systems   {ROS COMP (Optional):223526}      Objective    There were no vitals taken for this visit.  There is no height or weight on file to calculate BMI.  Physical Exam   {Exam List (Optional):203306}    {Diagnostic Test Results (Optional):624369::\"Diagnostic Test Results:\",\"Labs reviewed in Epic\"}        {PROVIDER CHARTING PREFERENCE:889657}      "

## 2020-01-17 ENCOUNTER — OFFICE VISIT (OUTPATIENT)
Dept: FAMILY MEDICINE | Facility: CLINIC | Age: 65
End: 2020-01-17
Payer: MEDICARE

## 2020-01-17 VITALS
DIASTOLIC BLOOD PRESSURE: 80 MMHG | TEMPERATURE: 97 F | HEIGHT: 71 IN | HEART RATE: 56 BPM | SYSTOLIC BLOOD PRESSURE: 122 MMHG | WEIGHT: 214.8 LBS | RESPIRATION RATE: 16 BRPM | BODY MASS INDEX: 30.07 KG/M2 | OXYGEN SATURATION: 97 %

## 2020-01-17 DIAGNOSIS — Z12.5 SCREENING FOR MALIGNANT NEOPLASM OF PROSTATE: ICD-10-CM

## 2020-01-17 DIAGNOSIS — M54.2 CERVICALGIA: ICD-10-CM

## 2020-01-17 DIAGNOSIS — Z00.00 WELCOME TO MEDICARE PREVENTIVE VISIT: Primary | ICD-10-CM

## 2020-01-17 DIAGNOSIS — C41.3 CHONDROSARCOMA OF RIBS (H): ICD-10-CM

## 2020-01-17 DIAGNOSIS — Z98.1 S/P CERVICAL SPINAL FUSION: ICD-10-CM

## 2020-01-17 LAB
ANION GAP SERPL CALCULATED.3IONS-SCNC: 7 MMOL/L (ref 3–14)
BUN SERPL-MCNC: 20 MG/DL (ref 7–30)
CALCIUM SERPL-MCNC: 9.6 MG/DL (ref 8.5–10.1)
CHLORIDE SERPL-SCNC: 108 MMOL/L (ref 94–109)
CHOLEST SERPL-MCNC: 142 MG/DL
CO2 SERPL-SCNC: 25 MMOL/L (ref 20–32)
CREAT SERPL-MCNC: 1.08 MG/DL (ref 0.66–1.25)
GFR SERPL CREATININE-BSD FRML MDRD: 72 ML/MIN/{1.73_M2}
GLUCOSE SERPL-MCNC: 105 MG/DL (ref 70–99)
HDLC SERPL-MCNC: 55 MG/DL
LDLC SERPL CALC-MCNC: 68 MG/DL
NONHDLC SERPL-MCNC: 87 MG/DL
POTASSIUM SERPL-SCNC: 4.3 MMOL/L (ref 3.4–5.3)
PSA SERPL-ACNC: 0.9 UG/L (ref 0–4)
SODIUM SERPL-SCNC: 140 MMOL/L (ref 133–144)
TRIGL SERPL-MCNC: 96 MG/DL

## 2020-01-17 PROCEDURE — 80048 BASIC METABOLIC PNL TOTAL CA: CPT | Performed by: PHYSICIAN ASSISTANT

## 2020-01-17 PROCEDURE — G0009 ADMIN PNEUMOCOCCAL VACCINE: HCPCS | Performed by: PHYSICIAN ASSISTANT

## 2020-01-17 PROCEDURE — 90732 PPSV23 VACC 2 YRS+ SUBQ/IM: CPT | Performed by: PHYSICIAN ASSISTANT

## 2020-01-17 PROCEDURE — G0402 INITIAL PREVENTIVE EXAM: HCPCS | Performed by: PHYSICIAN ASSISTANT

## 2020-01-17 PROCEDURE — 87389 HIV-1 AG W/HIV-1&-2 AB AG IA: CPT | Performed by: PHYSICIAN ASSISTANT

## 2020-01-17 PROCEDURE — G0103 PSA SCREENING: HCPCS | Performed by: PHYSICIAN ASSISTANT

## 2020-01-17 PROCEDURE — 80061 LIPID PANEL: CPT | Performed by: PHYSICIAN ASSISTANT

## 2020-01-17 PROCEDURE — 36415 COLL VENOUS BLD VENIPUNCTURE: CPT | Performed by: PHYSICIAN ASSISTANT

## 2020-01-17 PROCEDURE — G0403 EKG FOR INITIAL PREVENT EXAM: HCPCS | Performed by: PHYSICIAN ASSISTANT

## 2020-01-17 RX ORDER — MULTIVIT WITH MINERALS/LUTEIN
1 TABLET ORAL DAILY
COMMUNITY
End: 2021-08-31

## 2020-01-17 ASSESSMENT — ACTIVITIES OF DAILY LIVING (ADL): CURRENT_FUNCTION: NO ASSISTANCE NEEDED

## 2020-01-17 ASSESSMENT — MIFFLIN-ST. JEOR: SCORE: 1781.46

## 2020-01-17 NOTE — LETTER
January 20, 2020      Lev Cabral  [address] [place] [address]        Kevin Ohara,     Manolo seeing you last week.   The labs from your visit are included.  The EKG looked ok.  The HIV screen was negative.  The PSA, screening the prostate, looked healthy.  The cholesterol panel was very well controlled.  And finally, Screening of the kidneys, electrolytes and for diabetes looked within the expected ranges.  All of these are excellent.   Please let me know any questions about any of them,    Wilder Corley PA-C

## 2020-01-17 NOTE — PROGRESS NOTES
"SUBJECTIVE:   Lev Cabral is a 64 year old male who presents for Preventive Visit.  Are you in the first 12 months of your Medicare coverage? Yes,  Visual Acuity:  Right Eye: 10/20   Left Eye: 10/20  Both Eyes: 10/10    Healthy Habits:    In general, how would you rate your overall health?  Very good    Frequency of exercise:  6-7 days/week    Duration of exercise:  Greater than 60 minutes    Do you usually eat at least 4 servings of fruit and vegetables a day, include whole grains    & fiber and avoid regularly eating high fat or \"junk\" foods?  No    Taking medications regularly:  Yes    Barriers to taking medications:  Not applicable    Medication side effects:  Not applicable    Ability to successfully perform activities of daily living:  No assistance needed    Home Safety:  No safety concerns identified    Hearing Impairment:  No hearing concerns    In the past 6 months, have you been bothered by leaking of urine?  No    In general, how would you rate your overall mental or emotional health?  Good      PHQ-2 Total Score:    Additional concerns today:  No    He did     Do you feel safe in your environment? Yes    Have you ever done Advance Care Planning? (For example, a Health Directive, POLST, or a discussion with a medical provider or your loved ones about your wishes): No, advance care planning information given to patient to review.  Advanced care planning was discussed at today's visit.    Fall risk  Fallen 2 or more times in the past year?: No  Any fall with injury in the past year?: No    Cognitive Screening   1) Repeat 3 items (Leader, Season, Table)    2) Clock draw: NORMAL  3) 3 item recall: Recalls 3 objects  Results: 3 items recalled: COGNITIVE IMPAIRMENT LESS LIKELY    Mini-CogTM Copyright MELANIE Carvajal. Licensed by the author for use in [employer]; reprinted with permission ([email or web address]). All rights reserved.      Do you have sleep apnea, excessive snoring or daytime drowsiness?: " "yes    Reviewed and updated as needed this visit by clinical staff  Tobacco  Allergies  Meds  Med Hx  Surg Hx  Fam Hx  Soc Hx        Reviewed and updated as needed this visit by Provider        Social History     Tobacco Use     Smoking status: Never Smoker     Smokeless tobacco: Never Used   Substance Use Topics     Alcohol use: No     Alcohol/week: 0.0 standard drinks       Alcohol Use 7/21/2017   Prescreen: >3 drinks/day or >7 drinks/week? The patient does not drink >3 drinks per day nor >7 drinks per week.       Current providers sharing in care for this patient include:   Patient Care Team:  Cr Corley PA-C as PCP - General (Physician Assistant)  Cr Corley PA-C as Assigned PCP    The following health maintenance items are reviewed in Epic and correct as of today:  Health Maintenance   Topic Date Due     HIV SCREENING  11/06/1970     ZOSTER IMMUNIZATION (2 of 3) 09/15/2017     ADVANCE CARE PLANNING  05/01/2019     INFLUENZA VACCINE (1) 09/01/2019     MEDICARE ANNUAL WELLNESS VISIT  11/06/2019     FALL RISK ASSESSMENT  11/06/2019     AORTIC ANEURYSM SCREENING (SYSTEM ASSIGNED)  11/06/2019     PNEUMOCOCCAL IMMUNIZATION 65+ LOW/MEDIUM RISK (1 of 2 - PCV13) 11/06/2019     LIPID  07/21/2022     DTAP/TDAP/TD IMMUNIZATION (3 - Td) 05/01/2024     COLONOSCOPY  02/09/2027     HEPATITIS C SCREENING  Completed     PHQ-2  Completed     IPV IMMUNIZATION  Aged Out     MENINGITIS IMMUNIZATION  Aged Out     Lab work is in process  Pneumonia Vaccine: Needs P23 (USING NEW CDC GUIDELINES)  SHINGRIX: recommend today    Review of Systems  Constitutional, HEENT, cardiovascular, pulmonary, gi and gu systems are negative, except as otherwise noted.    OBJECTIVE:   /80   Pulse 56   Temp 97  F (36.1  C) (Tympanic)   Resp 16   Ht 1.803 m (5' 11\")   Wt 97.4 kg (214 lb 12.8 oz)   SpO2 97%   BMI 29.96 kg/m   Estimated body mass index is 29.96 kg/m  as calculated from the following:    Height as of " "this encounter: 1.803 m (5' 11\").    Weight as of this encounter: 97.4 kg (214 lb 12.8 oz).  Physical Exam  GENERAL: healthy, alert and no distress  EYES: Eyes grossly normal to inspection, PERRL and conjunctivae and sclerae normal  HENT: ear canals and TM's normal, nose and mouth without ulcers or lesions  NECK: limited exam  RESP: lungs clear to auscultation - no rales, rhonchi or wheezes  CV: regular rates and rhythm, normal S1 S2, no S3 or S4 and no murmur, click or rub  ABDOMEN: soft, nontender, no hepatosplenomegaly, no masses and bowel sounds normal   (male): normal male genitalia without lesions or urethral discharge, no hernia  MS: no gross musculoskeletal defects noted, no edema  SKIN: well healed surgical scars   PSYCH: mentation appears normal, affect normal/bright    Diagnostic Test Results:  Updating today.   EKG: sinus sabine; possible RBBB  ASSESSMENT / PLAN:   1. Welcome to Medicare preventive visit  Reviewed personal and family history. Reviewed age appropriate screenings. Recommended any needed vaccinations.  - EKG 12-lead complete w/read - Clinics (Optional for Welcome to Medicare)  - Lipid panel reflex to direct LDL Fasting  - Basic metabolic panel  - HIV Antigen Antibody Combo    2. Screening for malignant neoplasm of prostate  - Prostate spec antigen screen    3. Chondrosarcoma of ribs (H)  Continues monitoring with Dr. Sterling. Most recent scan 12/19    4. S/P cervical spinal fusion  Patient is s/p fusion but 3 years ago had an incident that dislodged screws in the hardware. He has managed symptoms since that time but is noting increased pain and irritation. I did defer exam and imaging today and will refer  - SPINE SURGERY REFERRAL    5. Cervicalgia  As above  - SPINE SURGERY REFERRAL    COUNSELING:  Reviewed preventive health counseling, as reflected in patient instructions    Estimated body mass index is 29.96 kg/m  as calculated from the following:    Height as of this encounter: 1.803 m " "(5' 11\").    Weight as of this encounter: 97.4 kg (214 lb 12.8 oz).    Weight management plan: Discussed healthy diet and exercise guidelines     reports that he has never smoked. He has never used smokeless tobacco.      Appropriate preventive services were discussed with this patient, including applicable screening as appropriate for cardiovascular disease, diabetes, osteopenia/osteoporosis, and glaucoma.  As appropriate for age/gender, discussed screening for colorectal cancer, prostate cancer, breast cancer, and cervical cancer. Checklist reviewing preventive services available has been given to the patient.    Reviewed patients plan of care and provided an AVS. The Basic Care Plan (routine screening as documented in Health Maintenance) for Lev meets the Care Plan requirement. This Care Plan has been established and reviewed with the Patient.    Counseling Resources:  ATP IV Guidelines  Pooled Cohorts Equation Calculator  Breast Cancer Risk Calculator  FRAX Risk Assessment  ICSI Preventive Guidelines  Dietary Guidelines for Americans, 2010  USDA's MyPlate  ASA Prophylaxis  Lung CA Screening    Cr Corley PA-C  [facility]    Identified Health Risks:    The patient was counseled and encouraged to consider modifying their diet and eating habits. He was provided with information on recommended healthy diet options.  "

## 2020-01-17 NOTE — PATIENT INSTRUCTIONS
At the pharmacy - check for coverage of the NEW shingles vaccine. It is 2 doses and I highly recommend you get them!  Patient Education   Personalized Prevention Plan  You are due for the preventive services outlined below.  Your care team is available to assist you in scheduling these services.  If you have already completed any of these items, please share that information with your care team to update in your medical record.  Health Maintenance Due   Topic Date Due     HIV Screening  11/06/1970     Zoster (Shingles) Vaccine (2 of 3) 09/15/2017     Discuss Advance Care Planning  05/01/2019     Flu Vaccine (1) 09/01/2019     Annual Wellness Visit  11/06/2019     FALL RISK ASSESSMENT  11/06/2019     AORTIC ANEURYSM SCREENING (SYSTEM ASSIGNED)  11/06/2019     Pneumococcal Vaccine (1 of 2 - PCV13) 11/06/2019       Understanding [employer] MyPlate  The USDA (U.S. Department of Agriculture) has guidelines to help you make healthy food choices. These are called MyPlate. MyPlate shows the food groups that make up healthy meals using the image of a place setting. Before you eat, think about the healthiest choices for what to put onto your plate or into your cup or bowl. To learn more about building a healthy plate, visit www.choosemyplate.gov.    The food groups    Fruits. Any fruit or 100% fruit juice counts as part of the Fruit Group. Fruits may be fresh, canned, frozen, or dried, and may be whole, cut-up, or pureed. Make half your plate fruits and vegetables.    Vegetables. Any vegetable or 100% vegetable juice counts as a member of the Vegetable Group. Vegetables may be fresh, frozen, canned, or dried. They can be served raw or cooked and may be whole, cut-up, or mashed. Make half your plate fruits and vegetables.    Grains. All foods made from grains are part of the Grains Group. These include wheat, rice, oats, cornmeal, and barley such as bread, pasta, oatmeal, cereal, tortillas, and grits. Grains should be no more than a  quarter of your plate. At least half of your grains should be whole grains.    Protein. This group includes meat, poultry, seafood, beans and peas, eggs, processed soy products (like tofu), nuts (including nut butters), and seeds. Make protein choices no more than a quarter of your plate. Meat and poultry choices should be lean or low fat.    Dairy. All fluid milk products and foods made from milk that contain calcium, like yogurt and cheese, are part of the Dairy Group. (Foods that have little calcium, such as cream, butter, and cream cheese, are not part of the group.) Most dairy choices should be low-fat or fat-free.    Oils. These are fats that are liquid at room temperature. They include canola, corn, olive, soybean, and sunflower oil. Foods that are mainly oil include mayonnaise, certain salad dressings, and soft margarines. You should have only 5 to 7 teaspoons of oils a day. You probably already get this much from the food you eat.  Date Last Reviewed: 8/1/2017 2000-2019 [employer]. [address] [place] [address]. All rights reserved. This information is not intended as a substitute for professional medical care. Always follow your healthcare professional's instructions.

## 2020-01-18 LAB — HIV 1+2 AB+HIV1 P24 AG SERPL QL IA: NONREACTIVE

## 2020-01-23 ENCOUNTER — TELEPHONE (OUTPATIENT)
Dept: FAMILY MEDICINE | Facility: CLINIC | Age: 65
End: 2020-01-23

## 2020-01-23 NOTE — TELEPHONE ENCOUNTER
Please call patient he would like to talk about his recent  Physical he has a few questions he wants to ask a nurse please nothing specific please call and non urgent.     Ok to leave a message

## 2020-01-23 NOTE — TELEPHONE ENCOUNTER
Question physical done  done and was wondering what the reference range on his labs meant. Went over with pt and pt verbalized understanding.     Nel Reed RN on 1/23/2020 at 1:52 PM

## 2020-02-24 DIAGNOSIS — N52.9 ERECTILE DYSFUNCTION, UNSPECIFIED ERECTILE DYSFUNCTION TYPE: ICD-10-CM

## 2020-02-24 NOTE — TELEPHONE ENCOUNTER
"Requested Prescriptions   Pending Prescriptions Disp Refills     sildenafil (REVATIO) 20 MG tablet [Pharmacy Med Name: SILDENAFIL CITRATE 20MG TABS] 30 tablet 3     Sig: TAKE 2 TO 5 TABLETS (40  MG) BY MOUTH 30 MINUTES TO 4 HOURS BEFORE SEX   Last Written Prescription Date:  4/29/19  Last Fill Quantity: 30,  # refills: 1   Last office visit: 1/17/2020 with prescribing provider:  Cr Corley PA-C   Future Office Visit:        Erectile Dysfuction Protocol Failed - 2/24/2020 11:24 AM        Failed - Medication is active on med list        Passed - Absence of nitrates on medication list        Passed - Absence of Alpha Blockers on Med list        Passed - Recent (12 mo) or future (30 days) visit within the authorizing provider's specialty     Patient has had an office visit with the authorizing provider or a provider within the authorizing providers department within the previous 12 mos or has a future within next 30 days. See \"Patient Info\" tab in inbasket, or \"Choose Columns\" in Meds & Orders section of the refill encounter.              Passed - Patient is age 18 or older         "

## 2020-02-25 RX ORDER — SILDENAFIL CITRATE 20 MG/1
TABLET ORAL
Qty: 30 TABLET | Refills: 3 | Status: SHIPPED | OUTPATIENT
Start: 2020-02-25 | End: 2020-10-16

## 2020-02-25 NOTE — TELEPHONE ENCOUNTER
Prescription approved per [facility] Refill Protocol.  Medication IS on active med list.     Janessa BATRES RN

## 2020-05-15 ENCOUNTER — NURSE TRIAGE (OUTPATIENT)
Dept: NURSING | Facility: CLINIC | Age: 65
End: 2020-05-15

## 2020-05-15 NOTE — TELEPHONE ENCOUNTER
Large wood tick removed with no complications.  Patient will do care advice.    Angélica Macias RN  [facility]       Additional Information    Negative: Sounds like a life-threatening emergency to the triager    Negative: [1] 2 to 14 days following tick bite AND [2] severe headache with fever occurs    Negative: [1] 2 to 14 days following tick bite AND [2] widespread rash with fever occurs    Negative: Patient sounds very sick or weak to the triager    Negative: [1] Fever AND [2] red area    Negative: [1] Fever AND [2] area is very tender to touch    Negative: [1] Red streak or red line AND [2] length > 2 inches (5 cm)    Negative: Can't remove live tick (after trying Care Advice)    Negative: Can't remove tick's head that was broken off in the skin (after trying Care Advice)    Negative: [1] 2 to 14 days following tick bite AND [2] fever AND [3] no rash or headache    Negative: [1] 2 to 14 days following tick bite AND [2] widespread rash or headache AND [3] no fever    Negative: [1] Red or very tender (to touch) area AND [2] started over 24 hours after the bite    Negative: Red ring or bull's-eye rash occurs at tick bite    Negative: [1] Probable deer tick AND [2] attached > 36 hours (or tick appears swollen, not flat) AND [3] occurred in an area where Lyme disease is common    Negative: [1] Scab is present AND [2] it drains pus or increases in size AND [3] not improved after applying antibiotic ointment for 2 days    Negative: [1] Scab is present AND [2] it drains pus or increases in size    Tick bite with no complications    Protocols used: TICK BITE-A-

## 2020-07-01 ENCOUNTER — HOSPITAL ENCOUNTER (OUTPATIENT)
Dept: CT IMAGING | Facility: CLINIC | Age: 65
Discharge: HOME | End: 2020-07-01
Attending: THORACIC SURGERY (CARDIOTHORACIC VASCULAR SURGERY) | Admitting: THORACIC SURGERY (CARDIOTHORACIC VASCULAR SURGERY)
Payer: MEDICARE

## 2020-07-01 DIAGNOSIS — C41.3: ICD-10-CM

## 2020-07-01 PROCEDURE — 71250 CT THORAX DX C-: CPT

## 2020-10-16 ENCOUNTER — OFFICE VISIT (OUTPATIENT)
Dept: FAMILY MEDICINE | Facility: CLINIC | Age: 65
End: 2020-10-16
Payer: MEDICARE

## 2020-10-16 VITALS
SYSTOLIC BLOOD PRESSURE: 132 MMHG | HEIGHT: 71 IN | OXYGEN SATURATION: 96 % | HEART RATE: 54 BPM | DIASTOLIC BLOOD PRESSURE: 82 MMHG | WEIGHT: 219 LBS | TEMPERATURE: 97.8 F | BODY MASS INDEX: 30.66 KG/M2

## 2020-10-16 DIAGNOSIS — R73.01 ELEVATED FASTING GLUCOSE: ICD-10-CM

## 2020-10-16 DIAGNOSIS — R53.83 FATIGUE, UNSPECIFIED TYPE: Primary | ICD-10-CM

## 2020-10-16 DIAGNOSIS — C41.3 CHONDROSARCOMA OF RIBS (H): ICD-10-CM

## 2020-10-16 DIAGNOSIS — M25.50 ARTHRALGIA, UNSPECIFIED JOINT: ICD-10-CM

## 2020-10-16 DIAGNOSIS — E55.9 VITAMIN D DEFICIENCY: ICD-10-CM

## 2020-10-16 DIAGNOSIS — Z13.31 POSITIVE DEPRESSION SCREENING: ICD-10-CM

## 2020-10-16 LAB
ALBUMIN SERPL-MCNC: 3.9 G/DL (ref 3.4–5)
ALP SERPL-CCNC: 87 U/L (ref 40–150)
ALT SERPL W P-5'-P-CCNC: 40 U/L (ref 0–70)
ANION GAP SERPL CALCULATED.3IONS-SCNC: 5 MMOL/L (ref 3–14)
AST SERPL W P-5'-P-CCNC: 27 U/L (ref 0–45)
B BURGDOR IGG+IGM SER QL: 0.11 (ref 0–0.89)
BASOPHILS # BLD AUTO: 0 10E9/L (ref 0–0.2)
BASOPHILS NFR BLD AUTO: 0.2 %
BILIRUB SERPL-MCNC: 0.7 MG/DL (ref 0.2–1.3)
BUN SERPL-MCNC: 18 MG/DL (ref 7–30)
CALCIUM SERPL-MCNC: 9.3 MG/DL (ref 8.5–10.1)
CHLORIDE SERPL-SCNC: 107 MMOL/L (ref 94–109)
CO2 SERPL-SCNC: 26 MMOL/L (ref 20–32)
CREAT SERPL-MCNC: 1.02 MG/DL (ref 0.66–1.25)
CRP SERPL-MCNC: <2.9 MG/L (ref 0–8)
DIFFERENTIAL METHOD BLD: NORMAL
EOSINOPHIL # BLD AUTO: 0.1 10E9/L (ref 0–0.7)
EOSINOPHIL NFR BLD AUTO: 2.5 %
ERYTHROCYTE [DISTWIDTH] IN BLOOD BY AUTOMATED COUNT: 12.9 % (ref 10–15)
ERYTHROCYTE [SEDIMENTATION RATE] IN BLOOD BY WESTERGREN METHOD: 5 MM/H (ref 0–20)
GFR SERPL CREATININE-BSD FRML MDRD: 76 ML/MIN/{1.73_M2}
GLUCOSE SERPL-MCNC: 106 MG/DL (ref 70–99)
HBA1C MFR BLD: 5.7 % (ref 0–5.6)
HCT VFR BLD AUTO: 52.1 % (ref 40–53)
HGB BLD-MCNC: 17 G/DL (ref 13.3–17.7)
LYMPHOCYTES # BLD AUTO: 1.3 10E9/L (ref 0.8–5.3)
LYMPHOCYTES NFR BLD AUTO: 23.6 %
MCH RBC QN AUTO: 29.5 PG (ref 26.5–33)
MCHC RBC AUTO-ENTMCNC: 32.6 G/DL (ref 31.5–36.5)
MCV RBC AUTO: 90 FL (ref 78–100)
MONOCYTES # BLD AUTO: 0.4 10E9/L (ref 0–1.3)
MONOCYTES NFR BLD AUTO: 7.7 %
NEUTROPHILS # BLD AUTO: 3.7 10E9/L (ref 1.6–8.3)
NEUTROPHILS NFR BLD AUTO: 66 %
PLATELET # BLD AUTO: 212 10E9/L (ref 150–450)
POTASSIUM SERPL-SCNC: 5.1 MMOL/L (ref 3.4–5.3)
PROT SERPL-MCNC: 7.8 G/DL (ref 6.8–8.8)
RBC # BLD AUTO: 5.77 10E12/L (ref 4.4–5.9)
SODIUM SERPL-SCNC: 138 MMOL/L (ref 133–144)
TSH SERPL DL<=0.005 MIU/L-ACNC: 3.25 MU/L (ref 0.4–4)
WBC # BLD AUTO: 5.6 10E9/L (ref 4–11)

## 2020-10-16 PROCEDURE — 86618 LYME DISEASE ANTIBODY: CPT | Performed by: PHYSICIAN ASSISTANT

## 2020-10-16 PROCEDURE — 85652 RBC SED RATE AUTOMATED: CPT | Performed by: PHYSICIAN ASSISTANT

## 2020-10-16 PROCEDURE — 86038 ANTINUCLEAR ANTIBODIES: CPT | Performed by: PHYSICIAN ASSISTANT

## 2020-10-16 PROCEDURE — 86140 C-REACTIVE PROTEIN: CPT | Performed by: PHYSICIAN ASSISTANT

## 2020-10-16 PROCEDURE — 36415 COLL VENOUS BLD VENIPUNCTURE: CPT | Performed by: PHYSICIAN ASSISTANT

## 2020-10-16 PROCEDURE — 83036 HEMOGLOBIN GLYCOSYLATED A1C: CPT | Performed by: PHYSICIAN ASSISTANT

## 2020-10-16 PROCEDURE — 82306 VITAMIN D 25 HYDROXY: CPT | Performed by: PHYSICIAN ASSISTANT

## 2020-10-16 PROCEDURE — 80053 COMPREHEN METABOLIC PANEL: CPT | Performed by: PHYSICIAN ASSISTANT

## 2020-10-16 PROCEDURE — 84443 ASSAY THYROID STIM HORMONE: CPT | Performed by: PHYSICIAN ASSISTANT

## 2020-10-16 PROCEDURE — 99214 OFFICE O/P EST MOD 30 MIN: CPT | Performed by: PHYSICIAN ASSISTANT

## 2020-10-16 PROCEDURE — 85025 COMPLETE CBC W/AUTO DIFF WBC: CPT | Performed by: PHYSICIAN ASSISTANT

## 2020-10-16 PROCEDURE — 86431 RHEUMATOID FACTOR QUANT: CPT | Performed by: PHYSICIAN ASSISTANT

## 2020-10-16 ASSESSMENT — PATIENT HEALTH QUESTIONNAIRE - PHQ9: SUM OF ALL RESPONSES TO PHQ QUESTIONS 1-9: 16

## 2020-10-16 ASSESSMENT — MIFFLIN-ST. JEOR: SCORE: 1800.51

## 2020-10-16 NOTE — PROGRESS NOTES
"Subjective     Lev Cabral is a 64 year old male who presents to clinic today for the following health issues:    HPI         Ongoing fatigue for past year, worsening. States he sleeps for 8-9 hours but is not rested at all when he wakes up. Thinks he snores but has never been told he stops breathing in his sleep. Has never had sleep study but has been recommended by PCP in the past.    Exercises regularly, no issues with this. No chest pain, shortness of breath, palpitations, dizziness, lightheadedness. No leg swelling.     No abdominal pain, nausea, vomiting, diarrhea, urinary symptoms. Appetite is normal.    No fever, night sweats, weight loss. He does have a history of chondrosarcoma of ribs. Follows with oncology regularly. Had CT chest on 7/1/20 which was stable without evidence to suggest new disease.    Also reports feeling more joint pain in hips and hands. No joint swelling, warmth. No injuries.    PHQ-9 score elevated today. He reports he was briefly on an antidepressant in the past but doesn't recall the medication. Doesn't feel like he is overly depressed. No SI/HI.  PHQ 10/16/2020   PHQ-9 Total Score 16   Q9: Thoughts of better off dead/self-harm past 2 weeks Not at all       Review of Systems   Constitutional, HEENT, cardiovascular, pulmonary, gi and gu systems are negative, except as otherwise noted.      Objective    /82   Pulse 54   Temp 97.8  F (36.6  C) (Oral)   Ht 1.803 m (5' 11\")   Wt 99.3 kg (219 lb)   SpO2 96%   BMI 30.54 kg/m    Body mass index is 30.54 kg/m .  Physical Exam   GENERAL: healthy, alert and no distress  EYES: Eyes grossly normal to inspection, PERRL and conjunctivae and sclerae normal  HENT: ear canals and TM's normal, nose and mouth without ulcers or lesions  NECK: no adenopathy, no asymmetry, masses, or scars and thyroid normal to palpation  RESP: lungs clear to auscultation - no rales, rhonchi or wheezes  CV: regular rate and rhythm, normal S1 S2, no S3 or " S4, no murmur, click or rub, no peripheral edema and peripheral pulses strong  MS: no gross musculoskeletal defects noted, no edema  NEURO: Normal strength and tone, mentation intact and speech normal  PSYCH: mentation appears normal, affect normal/bright    Results for orders placed or performed in visit on 10/16/20 (from the past 24 hour(s))   CBC with platelets and differential   Result Value Ref Range    WBC 5.6 4.0 - 11.0 10e9/L    RBC Count 5.77 4.4 - 5.9 10e12/L    Hemoglobin 17.0 13.3 - 17.7 g/dL    Hematocrit 52.1 40.0 - 53.0 %    MCV 90 78 - 100 fl    MCH 29.5 26.5 - 33.0 pg    MCHC 32.6 31.5 - 36.5 g/dL    RDW 12.9 10.0 - 15.0 %    Platelet Count 212 150 - 450 10e9/L    % Neutrophils 66.0 %    % Lymphocytes 23.6 %    % Monocytes 7.7 %    % Eosinophils 2.5 %    % Basophils 0.2 %    Absolute Neutrophil 3.7 1.6 - 8.3 10e9/L    Absolute Lymphocytes 1.3 0.8 - 5.3 10e9/L    Absolute Monocytes 0.4 0.0 - 1.3 10e9/L    Absolute Eosinophils 0.1 0.0 - 0.7 10e9/L    Absolute Basophils 0.0 0.0 - 0.2 10e9/L    Diff Method Automated Method    Hemoglobin A1c   Result Value Ref Range    Hemoglobin A1C 5.7 (H) 0 - 5.6 %   ESR: Erythrocyte sedimentation rate   Result Value Ref Range    Sed Rate 5 0 - 20 mm/h           Assessment & Plan     Fatigue, unspecified type  Chronic issue for past year. Also reporting joint pain in hips and hands. Discussed broad differential including depression, sleep issues (e.g. sleep apnea), thyroid problem, anemia, vitamin D deficiency, cardiac etiology. Will start with labs and referral to sleep clinic for evaluation. Discussed treatment of depression and further work up with echo but he would like to hold off at this time and see what initial work up reveals. Follow-up if worsening or no improvement.  - CBC with platelets and differential  - Comprehensive metabolic panel (BMP + Alb, Alk Phos, ALT, AST, Total. Bili, TP)  - TSH with free T4 reflex  - Vitamin D Deficiency  - Hemoglobin A1c  -  "Lyme Disease Mami with reflex to WB Serum  - Rheumatoid factor  - Anti Nuclear Mami IgG by IFA with Reflex  - ESR: Erythrocyte sedimentation rate  - CRP, inflammation  - SLEEP EVALUATION & MANAGEMENT REFERRAL - ADULT -[facility] [place] [facility]  [phone] (Age 18 and up); Future    Arthralgia, unspecified joint  See above  - CBC with platelets and differential  - Comprehensive metabolic panel (BMP + Alb, Alk Phos, ALT, AST, Total. Bili, TP)  - TSH with free T4 reflex  - Vitamin D Deficiency  - Hemoglobin A1c  - Lyme Disease Mami with reflex to WB Serum  - Rheumatoid factor  - Anti Nuclear Mami IgG by IFA with Reflex  - ESR: Erythrocyte sedimentation rate  - CRP, inflammation    Positive depression screening  He does not feel overly depressed and does not think fatigue is caused by depression. Denies SI/HI. We discussed that if work up is normal we should explore depression symptoms further and consider medication and/or therapy.    Elevated fasting glucose  - Hemoglobin A1c    Vitamin D deficiency  - Vitamin D Deficiency    Chondrosarcoma of ribs (H)  Chronic issue, followed by oncology. Had CT chest on 7/1/20 which was stable without evidence to suggest new disease.       BMI:   Estimated body mass index is 30.54 kg/m  as calculated from the following:    Height as of this encounter: 1.803 m (5' 11\").    Weight as of this encounter: 99.3 kg (219 lb).        Depression Screening Follow Up    PHQ 10/16/2020   PHQ-9 Total Score 16   Q9: Thoughts of better off dead/self-harm past 2 weeks Not at all     Last PHQ-9 10/16/2020   1.  Little interest or pleasure in doing things 3   2.  Feeling down, depressed, or hopeless 2   3.  Trouble falling or staying asleep, or sleeping too much 3   4.  Feeling tired or having little energy 3   5.  Poor appetite or overeating 2   6.  Feeling bad about yourself 0   7.  Trouble concentrating 3   8.  Moving slowly or restless 0   Q9: Thoughts of better off dead/self-harm past 2 " weeks 0   PHQ-9 Total Score 16   Difficulty at work, home, or with people Not difficult at all       Follow Up Actions Taken  Offered referral for mental health or medication for depression but he declines at this time.      Return in about 1 week (around 10/23/2020) for sleep clinic.    Cammie Jhaveri PA-C  [facility]

## 2020-10-19 LAB
ANA SER QL IF: NEGATIVE
DEPRECATED CALCIDIOL+CALCIFEROL SERPL-MC: 59 UG/L (ref 20–75)
RHEUMATOID FACT SER NEPH-ACNC: 7 IU/ML (ref 0–20)

## 2020-11-10 ENCOUNTER — TELEPHONE (OUTPATIENT)
Dept: FAMILY MEDICINE | Facility: CLINIC | Age: 65
End: 2020-11-10

## 2020-11-10 DIAGNOSIS — R68.89 OTHER GENERAL SYMPTOMS AND SIGNS: ICD-10-CM

## 2020-11-10 DIAGNOSIS — R53.83 FATIGUE, UNSPECIFIED TYPE: Primary | ICD-10-CM

## 2020-11-10 NOTE — TELEPHONE ENCOUNTER
Reason for call:  Other   Patient called regarding (reason for call): call back  Additional comments: Pt called with questions about a suggestion from Shakila on 10/16 appt in regards to a heart blockage test or procedure. Please contact pt to discuss and advise the next step of care. They are wondering the location and contact info for that.    Phone number to reach patient:  Home number on file [phone] (home)    Best Time:  Any    Can we leave a detailed message on this number?  Did not ask    Travel screening: Not Applicable

## 2020-11-10 NOTE — TELEPHONE ENCOUNTER
Order placed for echo. Please call to let him know someone from scheduling will reach out to schedule.    Cammie Jhaveri PA-C

## 2020-11-10 NOTE — TELEPHONE ENCOUNTER
Pt calls back.      He says his insurance does not cover a sleep study - they cover some of it and it would cost him a $1,000.      He is wondering if an Echo can be ordered per last ov?      Will forward to Cammie Jhaveri for advisal.

## 2020-12-07 ENCOUNTER — HOSPITAL ENCOUNTER (OUTPATIENT)
Dept: CARDIOLOGY | Facility: CLINIC | Age: 65
Discharge: HOME | End: 2020-12-07
Attending: PHYSICIAN ASSISTANT | Admitting: PHYSICIAN ASSISTANT
Payer: MEDICARE

## 2020-12-07 DIAGNOSIS — R68.89 OTHER GENERAL SYMPTOMS AND SIGNS: ICD-10-CM

## 2020-12-07 DIAGNOSIS — R53.83 FATIGUE, UNSPECIFIED TYPE: ICD-10-CM

## 2020-12-07 PROCEDURE — 93306 TTE W/DOPPLER COMPLETE: CPT | Mod: 26 | Performed by: INTERNAL MEDICINE

## 2020-12-07 PROCEDURE — 93306 TTE W/DOPPLER COMPLETE: CPT

## 2020-12-09 DIAGNOSIS — I77.810 MILD ASCENDING AORTA DILATION (H): Primary | ICD-10-CM

## 2020-12-11 ENCOUNTER — OFFICE VISIT (OUTPATIENT)
Dept: CARDIOLOGY | Facility: CLINIC | Age: 65
End: 2020-12-11
Attending: PHYSICIAN ASSISTANT
Payer: MEDICARE

## 2020-12-11 VITALS
BODY MASS INDEX: 31.02 KG/M2 | SYSTOLIC BLOOD PRESSURE: 154 MMHG | HEART RATE: 64 BPM | WEIGHT: 221.6 LBS | HEIGHT: 71 IN | DIASTOLIC BLOOD PRESSURE: 88 MMHG

## 2020-12-11 DIAGNOSIS — I77.810 MILD ASCENDING AORTA DILATION (H): ICD-10-CM

## 2020-12-11 PROCEDURE — 99204 OFFICE O/P NEW MOD 45 MIN: CPT | Performed by: INTERNAL MEDICINE

## 2020-12-11 RX ORDER — CYANOCOBALAMIN (VITAMIN B-12) 2500 MCG
1000 TABLET, SUBLINGUAL SUBLINGUAL DAILY
COMMUNITY

## 2020-12-11 ASSESSMENT — MIFFLIN-ST. JEOR: SCORE: 1807.3

## 2020-12-11 NOTE — LETTER
12/11/2020    Cr Corley PA-C  [address]  [facility] [address]    RE: Lev Cabral       Dear Colleague,    I had the pleasure of seeing Lev Cabral in the [facility].    CARDIOLOGY CONSULT    REASON FOR CONSULT: dilated aorta    PRIMARY CARE PHYSICIAN:  Cr Corley    HISTORY OF PRESENT ILLNESS:  65-year-old male seen for dilated ascending aorta. 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    At baseline he is quite active. He will lift weights for 2 hours every other day and use an elliptical machine up to 40 minutes. He also goes walking sometimes for 4 miles. He denies any shortness of breath or chest pain with exertion. He thinks his blood pressure for the most part has been normal, but has been intermittently high in the past. He has never been treated for hypertension.    He is currently under evaluation for some generalized fatigue. Echocardiogram December 2020 showed EF 55%, no valve disease, tricuspid aortic valve, ascending aorta 3.9 cm.    He has had multiple chest CTs since 2017. When measured, ascending aorta was 3.9 cm back in 2017. No significant coronary calcification is noted.    PAST MEDICAL HISTORY:  Past Medical History:   Diagnosis Date     Unspecified adjustment reaction        MEDICATIONS:  Current Outpatient Medications   Medication     multivitamin (CENTRUM SILVER) tablet     psyllium (METAMUCIL) 58.6 % POWD     sildenafil (REVATIO) 20 MG tablet     vitamin B-12 (CYANOCOBALAMIN) 2500 MCG sublingual tablet     VITAMIN D, CHOLECALCIFEROL, PO     sildenafil (VIAGRA) 50 MG tablet     No current facility-administered medications for this visit.        ALLERGIES:  No Known Allergies    SOCIAL HISTORY:  I have reviewed this patient's social history and updated it with pertinent information if needed. Lev Cabral  reports that he has never smoked. He has never used smokeless tobacco. He  "reports that he does not drink alcohol or use drugs.    FAMILY HISTORY:  I have reviewed this patient's family history and updated it with pertinent information if needed.   Family History   Problem Relation Age of Onset     Cancer Father         brain tumor     Diabetes Father      Diabetes Mother         Type 2     Colon Cancer No family hx of        REVIEW OF SYSTEMS:  Constitutional:  No weight loss, fever, chills  HEENT:  Eyes:  No visual loss, blurred vision, double vision or yellow sclerae. No hearing loss, sneezing, congestion, runny nose or sore throat.  Skin:  No rash or itching.  Cardiovascular: per HPI  Respiratory: per HPI  GI:  No anorexia, nausea, vomiting or diarrhea. No abdominal pain or blood.  :  No dysurea, hematuria  Neurologic:  No headache, dizziness, syncope, paralysis, ataxia, numbness or tingling in the extremities. No change in bowel or bladder control.  Musculoskeletal:  No muscle, back pain, joint pain or stiffness.  Hematologic:  No anemia, bleeding or bruising.  Lymphatics:  No enlarged nodes. No history of splenectomy.  Psychiatric:  No history of depression or anxiety.  Endocrine:  No reports of sweating, cold or heat intolerance. No polyuria or polydipsia.  Allergies:  No history of asthma, hives, eczema or rhinitis.    PHYSICAL EXAM:  BP (!) 154/88   Pulse 64   Ht 1.803 m (5' 11\")   Wt 100.5 kg (221 lb 9.6 oz)   BMI 30.91 kg/m      Constitutional: awake, alert, no distress  Eyes: PERRL, sclera nonicteric  ENT: trachea midline  Respiratory: Lungs clear  Cardiovascular: Regular rate and rhythm, no murmurs  GI: nondistended, nontender, bowel sounds present  Lymph/Hematologic: no lymphadenopathy  Skin: dry, no rash  Musculoskeletal: good muscle tone, strength 5/5 in upper and lower extremities  Neurologic: no focal deficits  Neuropsychiatric: appropriate affact    DATA:  Labs:     Recent Labs   Lab Test 01/17/20  1307 07/21/17  0911 08/03/15  1648 05/01/14  0923   CHOL 142 170 " 156 172   HDL 55 56 37* 36*   LDL 68 92 85 100   TRIG 96 112 168* 180*   CHOLHDLRATIO  --   --  4.2 4.7       ASSESSMENT:  65-year-old male seen for dilated ascending aorta. His aorta is 3.9 cm. It has not changed since 2017 based on review of chest CT from then. He probably has some mild intermittent hypertension. He also lifts a lot of weights in the past 3 years, although this probably has not played a significant role in the aortic size. It was explained that this is only a mild dilation. The threshold for surgical intervention is over 5 cm or certainly over 5.5 cm. We talked about the importance of controlling blood pressure. He will purchase a home blood pressure monitor.    He will be getting chest CTs for follow-up of his chondrosarcoma. Aortic can be measured off the CTs. After CTs are no longer needed, he could then have periodic echoes.    RECOMMENDATIONS:  1. Mild dilation of ascending aorta  -Strict blood pressure control, recommended home monitor, blood pressure goal 130/80 or less  -Low threshold for starting medication if blood pressure is high  -Follow-up with aorta size and chest CTs, after that echoes every 1 to 2 years    Follow-up in 1 year.    Dread Reid MD  Cardiology - [facility]  Pager:  [phone]  Text Page  December 11, 2020      Thank you for allowing me to participate in the care of your patient.    Sincerely,     Dread Reid MD     [facility]

## 2020-12-11 NOTE — PATIENT INSTRUCTIONS
Your recent echocardiogram showed that your heart function is normal and the valves in your heart are all working normally.  There was a mild dilation of the aorta, which is the main blood vessel that comes off the top of the heart.  A normal size is up to 3.5 cm, yours measured 3.9 cm.  It is not uncommon for this to enlarge a little with age.  You may also have some high blood pressure or sleep apnea that could be contributing to this.    The aorta can be measured on your chest CT scans.    Our schedulers will contact you later next year to arrange the follow-up appointment.    Recommend purchasing a home blood pressure monitor.  You should check your blood pressure a few times per week at different times of the day.  Generally the arm cuff machines are better than the wrist cuff machines.  A good brand is [employer] (Series 5 or 7 is adequate, Series 3 is more basic, Series 10 has smartphone synching that you may not need).  Cost is $40 to $60.  Blood pressure goal is 130/80 or less.  You can purchase them at [employer], [employer], any pharmacy, or online such as [employer].

## 2020-12-11 NOTE — PROGRESS NOTES
CARDIOLOGY CONSULT    REASON FOR CONSULT: dilated aorta    PRIMARY CARE PHYSICIAN:  Cr Corley    HISTORY OF PRESENT ILLNESS:  65-year-old male seen for dilated ascending aorta. 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    At baseline he is quite active. He will lift weights for 2 hours every other day and use an elliptical machine up to 40 minutes. He also goes walking sometimes for 4 miles. He denies any shortness of breath or chest pain with exertion. He thinks his blood pressure for the most part has been normal, but has been intermittently high in the past. He has never been treated for hypertension.    He is currently under evaluation for some generalized fatigue. Echocardiogram December 2020 showed EF 55%, no valve disease, tricuspid aortic valve, ascending aorta 3.9 cm.    He has had multiple chest CTs since 2017. When measured, ascending aorta was 3.9 cm back in 2017. No significant coronary calcification is noted.    PAST MEDICAL HISTORY:  Past Medical History:   Diagnosis Date     Unspecified adjustment reaction        MEDICATIONS:  Current Outpatient Medications   Medication     multivitamin (CENTRUM SILVER) tablet     psyllium (METAMUCIL) 58.6 % POWD     sildenafil (REVATIO) 20 MG tablet     vitamin B-12 (CYANOCOBALAMIN) 2500 MCG sublingual tablet     VITAMIN D, CHOLECALCIFEROL, PO     sildenafil (VIAGRA) 50 MG tablet     No current facility-administered medications for this visit.        ALLERGIES:  No Known Allergies    SOCIAL HISTORY:  I have reviewed this patient's social history and updated it with pertinent information if needed. Lev RILEY Cabral  reports that he has never smoked. He has never used smokeless tobacco. He reports that he does not drink alcohol or use drugs.    FAMILY HISTORY:  I have reviewed this patient's family history and updated it with pertinent information if needed.   Family History   Problem Relation Age of Onset     Cancer  "Father         brain tumor     Diabetes Father      Diabetes Mother         Type 2     Colon Cancer No family hx of        REVIEW OF SYSTEMS:  Constitutional:  No weight loss, fever, chills  HEENT:  Eyes:  No visual loss, blurred vision, double vision or yellow sclerae. No hearing loss, sneezing, congestion, runny nose or sore throat.  Skin:  No rash or itching.  Cardiovascular: per HPI  Respiratory: per HPI  GI:  No anorexia, nausea, vomiting or diarrhea. No abdominal pain or blood.  :  No dysurea, hematuria  Neurologic:  No headache, dizziness, syncope, paralysis, ataxia, numbness or tingling in the extremities. No change in bowel or bladder control.  Musculoskeletal:  No muscle, back pain, joint pain or stiffness.  Hematologic:  No anemia, bleeding or bruising.  Lymphatics:  No enlarged nodes. No history of splenectomy.  Psychiatric:  No history of depression or anxiety.  Endocrine:  No reports of sweating, cold or heat intolerance. No polyuria or polydipsia.  Allergies:  No history of asthma, hives, eczema or rhinitis.    PHYSICAL EXAM:  BP (!) 154/88   Pulse 64   Ht 1.803 m (5' 11\")   Wt 100.5 kg (221 lb 9.6 oz)   BMI 30.91 kg/m      Constitutional: awake, alert, no distress  Eyes: PERRL, sclera nonicteric  ENT: trachea midline  Respiratory: Lungs clear  Cardiovascular: Regular rate and rhythm, no murmurs  GI: nondistended, nontender, bowel sounds present  Lymph/Hematologic: no lymphadenopathy  Skin: dry, no rash  Musculoskeletal: good muscle tone, strength 5/5 in upper and lower extremities  Neurologic: no focal deficits  Neuropsychiatric: appropriate affact    DATA:  Labs:     Recent Labs   Lab Test 01/17/20  1307 07/21/17  0911 08/03/15  1648 05/01/14  0923   CHOL 142 170 156 172   HDL 55 56 37* 36*   LDL 68 92 85 100   TRIG 96 112 168* 180*   CHOLHDLRATIO  --   --  4.2 4.7       ASSESSMENT:  65-year-old male seen for dilated ascending aorta. His aorta is 3.9 cm. It has not changed since 2017 based on " review of chest CT from then. He probably has some mild intermittent hypertension. He also lifts a lot of weights in the past 3 years, although this probably has not played a significant role in the aortic size. It was explained that this is only a mild dilation. The threshold for surgical intervention is over 5 cm or certainly over 5.5 cm. We talked about the importance of controlling blood pressure. He will purchase a home blood pressure monitor.    He will be getting chest CTs for follow-up of his chondrosarcoma. Aortic can be measured off the CTs. After CTs are no longer needed, he could then have periodic echoes.    RECOMMENDATIONS:  1. Mild dilation of ascending aorta  -Strict blood pressure control, recommended home monitor, blood pressure goal 130/80 or less  -Low threshold for starting medication if blood pressure is high  -Follow-up with aorta size and chest CTs, after that echoes every 1 to 2 years    Follow-up in 1 year.    Dread Reid MD  Cardiology - [facility]  Pager:  [phone]  Text Page  December 11, 2020

## 2020-12-11 NOTE — LETTER
12/11/2020    Cr Corley PA-C  [address]  [facility] [address]    RE: Lev Cabral       Dear Colleague,    I had the pleasure of seeing Lev Cabral in the [facility].    CARDIOLOGY CONSULT    REASON FOR CONSULT: dilated aorta    PRIMARY CARE PHYSICIAN:  Cr Corley    HISTORY OF PRESENT ILLNESS:  65-year-old male seen for dilated ascending aorta. 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    At baseline he is quite active. He will lift weights for 2 hours every other day and use an elliptical machine up to 40 minutes. He also goes walking sometimes for 4 miles. He denies any shortness of breath or chest pain with exertion. He thinks his blood pressure for the most part has been normal, but has been intermittently high in the past. He has never been treated for hypertension.    He is currently under evaluation for some generalized fatigue. Echocardiogram December 2020 showed EF 55%, no valve disease, tricuspid aortic valve, ascending aorta 3.9 cm.    He has had multiple chest CTs since 2017. When measured, ascending aorta was 3.9 cm back in 2017. No significant coronary calcification is noted.    PAST MEDICAL HISTORY:  Past Medical History:   Diagnosis Date     Unspecified adjustment reaction        MEDICATIONS:  Current Outpatient Medications   Medication     multivitamin (CENTRUM SILVER) tablet     psyllium (METAMUCIL) 58.6 % POWD     sildenafil (REVATIO) 20 MG tablet     vitamin B-12 (CYANOCOBALAMIN) 2500 MCG sublingual tablet     VITAMIN D, CHOLECALCIFEROL, PO     sildenafil (VIAGRA) 50 MG tablet     No current facility-administered medications for this visit.        ALLERGIES:  No Known Allergies    SOCIAL HISTORY:  I have reviewed this patient's social history and updated it with pertinent information if needed. Lev Cabral  reports that he has never smoked. He has never used smokeless tobacco. He  "reports that he does not drink alcohol or use drugs.    FAMILY HISTORY:  I have reviewed this patient's family history and updated it with pertinent information if needed.   Family History   Problem Relation Age of Onset     Cancer Father         brain tumor     Diabetes Father      Diabetes Mother         Type 2     Colon Cancer No family hx of        REVIEW OF SYSTEMS:  Constitutional:  No weight loss, fever, chills  HEENT:  Eyes:  No visual loss, blurred vision, double vision or yellow sclerae. No hearing loss, sneezing, congestion, runny nose or sore throat.  Skin:  No rash or itching.  Cardiovascular: per HPI  Respiratory: per HPI  GI:  No anorexia, nausea, vomiting or diarrhea. No abdominal pain or blood.  :  No dysurea, hematuria  Neurologic:  No headache, dizziness, syncope, paralysis, ataxia, numbness or tingling in the extremities. No change in bowel or bladder control.  Musculoskeletal:  No muscle, back pain, joint pain or stiffness.  Hematologic:  No anemia, bleeding or bruising.  Lymphatics:  No enlarged nodes. No history of splenectomy.  Psychiatric:  No history of depression or anxiety.  Endocrine:  No reports of sweating, cold or heat intolerance. No polyuria or polydipsia.  Allergies:  No history of asthma, hives, eczema or rhinitis.    PHYSICAL EXAM:  BP (!) 154/88   Pulse 64   Ht 1.803 m (5' 11\")   Wt 100.5 kg (221 lb 9.6 oz)   BMI 30.91 kg/m      Constitutional: awake, alert, no distress  Eyes: PERRL, sclera nonicteric  ENT: trachea midline  Respiratory: Lungs clear  Cardiovascular: Regular rate and rhythm, no murmurs  GI: nondistended, nontender, bowel sounds present  Lymph/Hematologic: no lymphadenopathy  Skin: dry, no rash  Musculoskeletal: good muscle tone, strength 5/5 in upper and lower extremities  Neurologic: no focal deficits  Neuropsychiatric: appropriate affact    DATA:  Labs:     Recent Labs   Lab Test 01/17/20  1307 07/21/17  0911 08/03/15  1648 05/01/14  0923   CHOL 142 170 " 156 172   HDL 55 56 37* 36*   LDL 68 92 85 100   TRIG 96 112 168* 180*   CHOLHDLRATIO  --   --  4.2 4.7       ASSESSMENT:  65-year-old male seen for dilated ascending aorta. His aorta is 3.9 cm. It has not changed since 2017 based on review of chest CT from then. He probably has some mild intermittent hypertension. He also lifts a lot of weights in the past 3 years, although this probably has not played a significant role in the aortic size. It was explained that this is only a mild dilation. The threshold for surgical intervention is over 5 cm or certainly over 5.5 cm. We talked about the importance of controlling blood pressure. He will purchase a home blood pressure monitor.    He will be getting chest CTs for follow-up of his chondrosarcoma. Aortic can be measured off the CTs. After CTs are no longer needed, he could then have periodic echoes.    RECOMMENDATIONS:  1. Mild dilation of ascending aorta  -Strict blood pressure control, recommended home monitor, blood pressure goal 130/80 or less  -Low threshold for starting medication if blood pressure is high  -Follow-up with aorta size and chest CTs, after that echoes every 1 to 2 years    Follow-up in 1 year.    Dread Reid MD  Cardiology - [facility]  Pager:  [phone]  Text Page  December 11, 2020          Thank you for allowing me to participate in the care of your patient.      Sincerely,     Dread Reid MD     [facility]    cc:   Cammie Jhaveri PA-C  [address] [place] [address]

## 2020-12-19 DIAGNOSIS — N52.9 ERECTILE DYSFUNCTION, UNSPECIFIED ERECTILE DYSFUNCTION TYPE: ICD-10-CM

## 2020-12-21 RX ORDER — SILDENAFIL CITRATE 20 MG/1
TABLET ORAL
Qty: 30 TABLET | Refills: 1 | Status: SHIPPED | OUTPATIENT
Start: 2020-12-21 | End: 2021-04-05

## 2020-12-21 NOTE — TELEPHONE ENCOUNTER
Prescription approved per [facility] Refill Protocol.  Allison Almodovar RN

## 2021-01-04 ENCOUNTER — HOSPITAL ENCOUNTER (OUTPATIENT)
Dept: CT IMAGING | Facility: CLINIC | Age: 66
Discharge: HOME | End: 2021-01-04
Attending: THORACIC SURGERY (CARDIOTHORACIC VASCULAR SURGERY) | Admitting: THORACIC SURGERY (CARDIOTHORACIC VASCULAR SURGERY)
Payer: MEDICARE

## 2021-01-04 DIAGNOSIS — C41.9 CHONDROSARCOMA (H): ICD-10-CM

## 2021-01-04 PROCEDURE — 71250 CT THORAX DX C-: CPT

## 2021-03-20 ENCOUNTER — HEALTH MAINTENANCE LETTER (OUTPATIENT)
Age: 66
End: 2021-03-20

## 2021-04-04 DIAGNOSIS — N52.9 ERECTILE DYSFUNCTION, UNSPECIFIED ERECTILE DYSFUNCTION TYPE: ICD-10-CM

## 2021-04-05 RX ORDER — SILDENAFIL CITRATE 20 MG/1
TABLET ORAL
Qty: 30 TABLET | Refills: 1 | Status: SHIPPED | OUTPATIENT
Start: 2021-04-05 | End: 2021-08-23

## 2021-04-05 NOTE — TELEPHONE ENCOUNTER
Prescription approved per [facility] Refill Protocol.  Allison Almodovar RN   4y11m M presenting with fever x 1 day. 4y11m M presenting with fever for one day. Had a fever three days ago 102.7. Yesterday, had a tactile temperature father did not measure the temperature. Two days ago, did not have a fever or cough. 4y11m M presenting with fever for one day. Had a fever three days ago 102.7. Yesterday, had a tactile temperature father did not measure the temperature. Two days ago, did not have a fever or cough. Today, he had a productive cough but no fever. Mom brought him in for the productive cough.  Pmhx: none  Pshx: none  Allergies: none  Meds: none  Immunizations: UTD

## 2021-07-12 ENCOUNTER — HOSPITAL ENCOUNTER (OUTPATIENT)
Dept: CT IMAGING | Facility: CLINIC | Age: 66
Discharge: HOME | End: 2021-07-12
Attending: THORACIC SURGERY (CARDIOTHORACIC VASCULAR SURGERY) | Admitting: THORACIC SURGERY (CARDIOTHORACIC VASCULAR SURGERY)
Payer: MEDICARE

## 2021-07-12 DIAGNOSIS — C41.9 CHONDROSARCOMA (H): ICD-10-CM

## 2021-07-12 PROCEDURE — 71250 CT THORAX DX C-: CPT

## 2021-08-06 ENCOUNTER — TRANSFERRED RECORDS (OUTPATIENT)
Dept: HEALTH INFORMATION MANAGEMENT | Facility: CLINIC | Age: 66
End: 2021-08-06

## 2021-08-21 DIAGNOSIS — N52.9 ERECTILE DYSFUNCTION, UNSPECIFIED ERECTILE DYSFUNCTION TYPE: ICD-10-CM

## 2021-08-23 RX ORDER — SILDENAFIL CITRATE 20 MG/1
TABLET ORAL
Qty: 30 TABLET | Refills: 1 | Status: SHIPPED | OUTPATIENT
Start: 2021-08-23 | End: 2022-01-25

## 2021-08-23 NOTE — TELEPHONE ENCOUNTER
Routing refill request to provider for review/approval because:  Last prescribed in 2019    Next 5 appointments (look out 90 days)      Aug 31, 2021  7:00 AM  Office Visit with Cr Corley PA-C  [facility] ([facility] ) [address] [facility] [address] [phone]          Janessa BATRES RN

## 2021-08-31 ENCOUNTER — OFFICE VISIT (OUTPATIENT)
Dept: FAMILY MEDICINE | Facility: CLINIC | Age: 66
End: 2021-08-31
Payer: MEDICARE

## 2021-08-31 VITALS
HEIGHT: 71 IN | DIASTOLIC BLOOD PRESSURE: 80 MMHG | RESPIRATION RATE: 14 BRPM | WEIGHT: 205.5 LBS | SYSTOLIC BLOOD PRESSURE: 118 MMHG | TEMPERATURE: 97.9 F | HEART RATE: 58 BPM | BODY MASS INDEX: 28.77 KG/M2 | OXYGEN SATURATION: 97 %

## 2021-08-31 DIAGNOSIS — Z00.00 ENCOUNTER FOR WELLNESS EXAMINATION: Primary | ICD-10-CM

## 2021-08-31 DIAGNOSIS — H91.92 CHANGE IN HEARING OF LEFT EAR: ICD-10-CM

## 2021-08-31 DIAGNOSIS — C41.3 CHONDROSARCOMA OF RIBS (H): ICD-10-CM

## 2021-08-31 DIAGNOSIS — Z12.5 SCREENING FOR PROSTATE CANCER: ICD-10-CM

## 2021-08-31 DIAGNOSIS — I77.810 MILD ASCENDING AORTA DILATION (H): ICD-10-CM

## 2021-08-31 LAB
ALBUMIN SERPL-MCNC: 3.7 G/DL (ref 3.4–5)
ALP SERPL-CCNC: 81 U/L (ref 40–150)
ALT SERPL W P-5'-P-CCNC: 34 U/L (ref 0–70)
ANION GAP SERPL CALCULATED.3IONS-SCNC: 5 MMOL/L (ref 3–14)
AST SERPL W P-5'-P-CCNC: 28 U/L (ref 0–45)
BILIRUB SERPL-MCNC: 0.7 MG/DL (ref 0.2–1.3)
BUN SERPL-MCNC: 22 MG/DL (ref 7–30)
CALCIUM SERPL-MCNC: 9.1 MG/DL (ref 8.5–10.1)
CHLORIDE BLD-SCNC: 111 MMOL/L (ref 94–109)
CO2 SERPL-SCNC: 23 MMOL/L (ref 20–32)
CREAT SERPL-MCNC: 0.98 MG/DL (ref 0.66–1.25)
GFR SERPL CREATININE-BSD FRML MDRD: 80 ML/MIN/1.73M2
GLUCOSE BLD-MCNC: 112 MG/DL (ref 70–99)
POTASSIUM BLD-SCNC: 4.2 MMOL/L (ref 3.4–5.3)
PROT SERPL-MCNC: 7.2 G/DL (ref 6.8–8.8)
PSA SERPL-MCNC: 1.56 UG/L (ref 0–4)
SODIUM SERPL-SCNC: 139 MMOL/L (ref 133–144)

## 2021-08-31 PROCEDURE — G0438 PPPS, INITIAL VISIT: HCPCS | Performed by: PHYSICIAN ASSISTANT

## 2021-08-31 PROCEDURE — 80053 COMPREHEN METABOLIC PANEL: CPT | Performed by: PHYSICIAN ASSISTANT

## 2021-08-31 PROCEDURE — 99213 OFFICE O/P EST LOW 20 MIN: CPT | Mod: 25 | Performed by: PHYSICIAN ASSISTANT

## 2021-08-31 PROCEDURE — 36415 COLL VENOUS BLD VENIPUNCTURE: CPT | Performed by: PHYSICIAN ASSISTANT

## 2021-08-31 PROCEDURE — G0103 PSA SCREENING: HCPCS | Performed by: PHYSICIAN ASSISTANT

## 2021-08-31 ASSESSMENT — MIFFLIN-ST. JEOR: SCORE: 1734.27

## 2021-08-31 NOTE — PROGRESS NOTES
"    Assessment & Plan     Encounter for wellness examination  Reviewed personal and family history. Reviewed age appropriate screenings. Recommended any needed vaccinations. We did briefly discuss his mood/memory. For now we'll monitor  - Comprehensive metabolic panel (BMP + Alb, Alk Phos, ALT, AST, Total. Bili, TP); Future  - Comprehensive metabolic panel (BMP + Alb, Alk Phos, ALT, AST, Total. Bili, TP)    Mild ascending aorta dilation (H)  Numerous chest CTs havent mentioned any changes. Ok to continue monitoring through these, then plan for echo. BP is well controlled  - Comprehensive metabolic panel (BMP + Alb, Alk Phos, ALT, AST, Total. Bili, TP); Future  - Comprehensive metabolic panel (BMP + Alb, Alk Phos, ALT, AST, Total. Bili, TP)    Chondrosarcoma of ribs (H)  Continue follow up with Dr. Sterling. Stable with continued routine imagin     Screening for prostate cancer  Updating   - PSA, screen; Future  - PSA, screen    Change in hearing of left ear  The ears appear grossly normal today. I do recommend ENT consult to assess hearing more definitively   - Otolaryngology Referral; Future      BMI:   Estimated body mass index is 28.66 kg/m  as calculated from the following:    Height as of this encounter: 1.803 m (5' 11\").    Weight as of this encounter: 93.2 kg (205 lb 8 oz).       Return in about 1 year (around 8/31/2022).    Cr Corley PA-C  [facility] LEO GILES is a 65 year old who presents for the following health issues     HPI     Concerns: Left ear plugged- [facility] and they said there was no wax. Has tried qtips as well; no improvement  Hearing is much more difficult, still L>R; no nasal congestion    Annual Wellness Visit    Patient has been advised of split billing requirements and indicates understanding: Yes     Are you in the first 12 months of your Medicare Part B coverage?  No    Physical Health:    In general, how " "would you rate your overall physical health? good    Outside of work, how many days during the week do you exercise?6-7 days/week    Outside of work, approximately how many minutes a day do you exercise?greater than 60 minutes    If you drink alcohol do you typically have >3 drinks per day or >7 drinks per week? No    Do you usually eat at least 4 servings of fruit and vegetables a day, include whole grains & fiber and avoid regularly eating high fat or \"junk\" foods? NO    Do you have any problems taking medications regularly? No    Do you have any side effects from medications? none    Needs assistance for the following daily activities: no assistance needed    Which of the following safety concerns are present in your home?  none identified     Hearing impairment: No    In the past 6 months, have you been bothered by leaking of urine? no    Mental Health:    In general, how would you rate your overall mental or emotional health? Fair - some stress; not wanting to do any treating at this time  PHQ-2 Score:    PHQ-2 Score:     PHQ-2 (  [employer]) 2021   Q1: Little interest or pleasure in doing things 0 0   Q2: Feeling down, depressed or hopeless 0 0   PHQ-2 Score 0 0   Q1: Little interest or pleasure in doing things - -   Q2: Feeling down, depressed or hopeless - -   PHQ-2 Score - -       Do you feel safe in your environment? Yes    Have you ever done Advance Care Planning? (For example, a Health Directive, POLST, or a discussion with a medical provider or your loved ones about your wishes)? No, advance care planning information given to patient to review.  Patient declined advance care planning discussion at this time.    Fall risk:  Fallen 2 or more times in the past year?: Yes  Any fall with injury in the past year?: No    Cognitive Screenin) Repeat 3 items (Leader, Season, Table)    2) Clock draw: NORMAL  3) 3 item recall: Recalls 2 objects   Results: NORMAL clock, 1-2 items recalled: " "COGNITIVE IMPAIRMENT LESS LIKELY    Mini-CogTM Copyright S Alena. Licensed by the author for use in [facility]; reprinted with permission (soeda@.[email or web address]). All rights reserved.      Do you have sleep apnea, excessive snoring or daytime drowsiness?: yes    Current providers sharing in care for this patient include:   Patient Care Team:  Cr Corley PA-C as PCP - General (Physician Assistant)  Dread Reid MD as Assigned Heart and Vascular Provider  Cammie Jhaveri PA-C as Assigned PCP    Patient has been advised of split billing requirements and indicates understanding: Yes    Review of Systems   Constitutional, HEENT, cardiovascular, pulmonary, gi and gu systems are negative, except as otherwise noted.      Objective    /80 (BP Location: Right arm, Patient Position: Chair, Cuff Size: Adult Regular)   Pulse 58   Temp 97.9  F (36.6  C) (Tympanic)   Resp 14   Ht 1.803 m (5' 11\")   Wt 93.2 kg (205 lb 8 oz)   SpO2 97%   BMI 28.66 kg/m    Body mass index is 28.66 kg/m .  Physical Exam   GENERAL: healthy, alert and no distress  EYES: Eyes grossly normal to inspection, PERRL and conjunctivae and sclerae normal  HENT: ear canals and TM's normal, nose and mouth without ulcers or lesions  NECK: no adenopathy, no asymmetry, masses, or scars and thyroid normal to palpation  RESP: lungs clear to auscultation - no rales, rhonchi or wheezes  CV: regular rate and rhythm, normal S1 S2, no S3 or S4, no murmur, click or rub, no peripheral edema and peripheral pulses strong  ABDOMEN: soft, nontender, no hepatosplenomegaly, no masses and bowel sounds normal  MS: no gross musculoskeletal defects noted, no edema  SKIN: no suspicious lesions or rashes  NEURO: Normal strength and tone, mentation intact and speech normal  PSYCH: mentation appears normal, affect flat and judgement and insight intact    Reviewed prior imaging; updating today            "

## 2021-08-31 NOTE — PATIENT INSTRUCTIONS
Preventive Health Recommendations  See your health care provider every year to    Review health changes.     Discuss preventive care.      Review your medicines if your doctor has prescribed any.    Talk with your health care provider about whether you should have a test to screen for prostate cancer (PSA).    Every 3 years, have a diabetes test (fasting glucose). If you are at risk for diabetes, you should have this test more often.    Every 5 years, have a cholesterol test. Have this test more often if you are at risk for high cholesterol or heart disease.     Every 10 years, have a colonoscopy. Or, have a yearly FIT test (stool test). These exams will check for colon cancer.    Talk to with your health care provider about screening for Abdominal Aortic Aneurysm if you have a family history of AAA or have a history of smoking.    Shots:     Get a flu shot each year.     Get a tetanus shot every 10 years.     Talk to your doctor about your pneumonia vaccines. There are now two you should receive - Pneumovax (PPSV 23) and Prevnar (PCV 13).    Talk to your pharmacist about a shingles vaccine.     Talk to your doctor about the hepatitis B vaccine.    Nutrition:     Eat at least 5 servings of fruits and vegetables each day.     Eat whole-grain bread, whole-wheat pasta and brown rice instead of white grains and rice.     Get adequate Calcium and Vitamin D.     Lifestyle    Exercise for at least 150 minutes a week (30 minutes a day, 5 days a week). This will help you control your weight and prevent disease.     Limit alcohol to one drink per day.     No smoking.     Wear sunscreen to prevent skin cancer.     See your dentist every six months for an exam and cleaning.     See your eye doctor every 1 to 2 years to screen for conditions such as glaucoma, macular degeneration and cataracts.

## 2021-09-04 ENCOUNTER — HEALTH MAINTENANCE LETTER (OUTPATIENT)
Age: 66
End: 2021-09-04

## 2022-01-04 ENCOUNTER — HOSPITAL ENCOUNTER (OUTPATIENT)
Dept: CT IMAGING | Facility: CLINIC | Age: 67
Discharge: HOME | End: 2022-01-04
Attending: THORACIC SURGERY (CARDIOTHORACIC VASCULAR SURGERY) | Admitting: THORACIC SURGERY (CARDIOTHORACIC VASCULAR SURGERY)
Payer: MEDICARE

## 2022-01-04 DIAGNOSIS — Q25.44 CONGENITAL DILATION OF ASCENDING AORTA: ICD-10-CM

## 2022-01-04 DIAGNOSIS — C41.9 CHONDROSARCOMA (H): ICD-10-CM

## 2022-01-04 PROCEDURE — 71250 CT THORAX DX C-: CPT

## 2022-01-22 DIAGNOSIS — N52.9 ERECTILE DYSFUNCTION, UNSPECIFIED ERECTILE DYSFUNCTION TYPE: ICD-10-CM

## 2022-01-25 RX ORDER — SILDENAFIL CITRATE 20 MG/1
TABLET ORAL
Qty: 30 TABLET | Refills: 1 | Status: SHIPPED | OUTPATIENT
Start: 2022-01-25 | End: 2022-06-08

## 2022-05-18 ENCOUNTER — HOSPITAL ENCOUNTER (OUTPATIENT)
Facility: CLINIC | Age: 67
End: 2022-05-18
Attending: INTERNAL MEDICINE | Admitting: INTERNAL MEDICINE
Payer: MEDICARE

## 2022-06-06 DIAGNOSIS — N52.9 ERECTILE DYSFUNCTION, UNSPECIFIED ERECTILE DYSFUNCTION TYPE: ICD-10-CM

## 2022-06-08 RX ORDER — SILDENAFIL CITRATE 20 MG/1
TABLET ORAL
Qty: 30 TABLET | Refills: 0 | Status: SHIPPED | OUTPATIENT
Start: 2022-06-08 | End: 2022-08-22

## 2022-06-08 NOTE — TELEPHONE ENCOUNTER
Annual visit due August, last refill   Prescription approved per [facility] Refill Protocol.  Bindu Rasheed RN, BSN  [facility]

## 2022-08-21 DIAGNOSIS — N52.9 ERECTILE DYSFUNCTION, UNSPECIFIED ERECTILE DYSFUNCTION TYPE: ICD-10-CM

## 2022-08-22 RX ORDER — SILDENAFIL CITRATE 20 MG/1
TABLET ORAL
Qty: 30 TABLET | Refills: 0 | Status: SHIPPED | OUTPATIENT
Start: 2022-08-22 | End: 2022-10-24

## 2022-08-22 NOTE — TELEPHONE ENCOUNTER
Prescription approved per [facility] Refill Protocol.    Maxine Feng RN on 8/22/2022 at 12:14 PM

## 2022-08-23 ENCOUNTER — OFFICE VISIT (OUTPATIENT)
Dept: INTERNAL MEDICINE | Facility: CLINIC | Age: 67
End: 2022-08-23
Payer: MEDICARE

## 2022-08-23 VITALS
OXYGEN SATURATION: 96 % | TEMPERATURE: 98.3 F | HEART RATE: 64 BPM | WEIGHT: 215 LBS | BODY MASS INDEX: 29.99 KG/M2 | DIASTOLIC BLOOD PRESSURE: 88 MMHG | SYSTOLIC BLOOD PRESSURE: 135 MMHG

## 2022-08-23 DIAGNOSIS — M25.511 CHRONIC RIGHT SHOULDER PAIN: Primary | ICD-10-CM

## 2022-08-23 DIAGNOSIS — G89.29 CHRONIC RIGHT SHOULDER PAIN: Primary | ICD-10-CM

## 2022-08-23 PROCEDURE — 99213 OFFICE O/P EST LOW 20 MIN: CPT | Performed by: INTERNAL MEDICINE

## 2022-08-23 ASSESSMENT — PATIENT HEALTH QUESTIONNAIRE - PHQ9
10. IF YOU CHECKED OFF ANY PROBLEMS, HOW DIFFICULT HAVE THESE PROBLEMS MADE IT FOR YOU TO DO YOUR WORK, TAKE CARE OF THINGS AT HOME, OR GET ALONG WITH OTHER PEOPLE: NOT DIFFICULT AT ALL
SUM OF ALL RESPONSES TO PHQ QUESTIONS 1-9: 9
SUM OF ALL RESPONSES TO PHQ QUESTIONS 1-9: 9

## 2022-08-23 NOTE — PROGRESS NOTES
ASSESSMENT/PLAN                                                      (M25.511,  G89.29) Chronic right shoulder pain  (primary encounter diagnosis)  Comment: suspect rotator cuff tendinitis with possible tear as minor component.  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     China Camp MD   [address] [address]  T: [phone], F: [phone]    SUBJECTIVE                                                      Lev Cabral is a very pleasant 66 year old right-handed male who presents with right shoulder pain:    Shoulder pain for started back in April.  No precipitating trauma or injury, but patient lifts weights and performs archery regularly. Patient took a break from both for several weeks with significant improvement in pain. Pain recurred after restarting weightlifting and archery. Pain occurs when arm is abducted between 45 and 90 degrees, but improves once arm is abducted past 90 degrees. Pain is worse when drawing his bow back during archery or lifting weights with his right arm. Does endorse mild associated weakness.    No prior history of right shoulder injuries, procedures, or surgeries.    OBJECTIVE                                                      /88   Pulse 64   Temp 98.3  F (36.8  C) (Oral)   Wt 97.5 kg (215 lb)   SpO2 96%   BMI 29.99 kg/m    Constitutional: well-appearing  Psych: normal judgment and insight; normal mood and affect; recent and remote memory intact    ---    (Note documentation was completed, in part, with flikdate voice-recognition software. Documentation was reviewed, but some grammatical, spelling, and word errors may  remain.)

## 2022-08-25 ENCOUNTER — THERAPY VISIT (OUTPATIENT)
Dept: PHYSICAL THERAPY | Facility: CLINIC | Age: 67
End: 2022-08-25
Attending: INTERNAL MEDICINE
Payer: MEDICARE

## 2022-08-25 DIAGNOSIS — G89.29 CHRONIC RIGHT SHOULDER PAIN: ICD-10-CM

## 2022-08-25 DIAGNOSIS — M25.511 CHRONIC RIGHT SHOULDER PAIN: ICD-10-CM

## 2022-08-25 PROCEDURE — 97161 PT EVAL LOW COMPLEX 20 MIN: CPT | Mod: GP | Performed by: PHYSICAL THERAPIST

## 2022-08-25 PROCEDURE — 97110 THERAPEUTIC EXERCISES: CPT | Mod: GP | Performed by: PHYSICAL THERAPIST

## 2022-08-25 NOTE — PROGRESS NOTES
"Physical Therapy Initial Evaluation  Subjective:  The history is provided by the patient.   Patient Health History  Lev Cabral being seen for Rt shoulder.     Problem began: 5/1/2022.   Problem occurred: weigtht lifting   Pain is reported as 3/10 on pain scale.  General health as reported by patient is good.  Pertinent medical history includes: cancer, heart problems and numbness/tingling.   Red flags:  None as reported by patient.     Surgeries include:  Cancer surgery.        Current occupation is Retired.                     Therapist Generated HPI Evaluation  Problem details: Main concern is right shoulder pain that started in May.  It was very sore when his symptoms first started, \"could hardly lift to shoulder height.\"  It has improved but still hard to lift at or around shoulder height.  Pt. Cannot lift weights at all.  He is concerned about being able to bow hunt this fall.  Hx of Cx fusion in 1998.  Reports two levels and prior to this surgery he had weakness, right arm/shoulder.  He re-injured his neck in 2013 and also suffered a concussion.    Patient also injured his LB 3-4 months ago on his elliptical machine.  He started mm relaxants which helped his sxs improve in 4 days, but he still has LBP and states T&N in rt foot, 1st toe.  .         Type of problem:  Right shoulder.    This is a chronic condition.  Condition occurred with:  Repetition/overuse and lifting.  Where condition occurred: at home.  Patient reports pain:  Lateral and in the joint.  Pain is described as aching and sharp   Pain radiates to:  Upper arm. Pain is worse during the day.  Since onset symptoms are gradually improving.  Associated symptoms:  Loss of motion/stiffness, loss of strength and painful arc. Symptoms are exacerbated by lifting, carrying, certain positions, using arm at shoulder level and using arm behind back  and relieved by rest.      Barriers include:  None as reported by patient.                    "     Objective:  System              Cervical/Thoracic Evaluation    AROM:  AROM Cervical:    Flexion:         Extension:        Rotation:         Left: 75     Right: 75  Side Bend:      Left:     Right:                                Shoulder Evaluation:  ROM:  AROM:    Flexion:  Left:  165    Right:  160                      Extension/Internal Rotation:  Left:  T6    Right:  T5    PROM:            Internal Rotation:  Left:  80    Right:  80  External Rotation:  Left:  70    Right:  65                    Strength:  : scap winging noted with arom ext/ir rt and lt.  Mild dyskinesia right with flex, abd 0-90.  RIT horiontal abd + right.  MMT elbow flex, ext pain-free.  Flexion: Left:5-/5 Strong/pain free    Pain:    Right: 4/5  Strong/pain free     Pain:     Abduction:  Left: 5-/5  Strong/pain free  Pain:    Right: 4/5   Strong/painful    Pain:      External Rotation:   Left:4+/5   Strong/pain free    Pain:   Right:4-/5   Strong/pain free    Pain:            Stability Testing:  normal      Special Tests:      Right shoulder positive for the following special tests:Impingement; Rotator cuff tear and Acrimioclavicular  Palpation:      Right shoulder tenderness present at: Acrimioclavicular and Supraspinatus  Right shoulder tenderness not present at:[place]                                     General     ROS    Assessment/Plan:    Patient is a 66 year old male with right side shoulder complaints.    Patient has the following significant findings with corresponding treatment plan.                Diagnosis 1:  Right shoulder pain, sxs consistent with RTC strain, moderate atrophy right shoulder  Pain -  manual therapy, self management, education, directional preference exercise and home program  Decreased ROM/flexibility - therapeutic exercise and home program  Decreased joint mobility - manual therapy, therapeutic exercise, therapeutic activity and home program  Decreased strength - therapeutic exercise,  therapeutic activities and home program  Decreased proprioception - neuro re-education, therapeutic activities and home program  Decreased function - therapeutic activities and home program    Therapy Evaluation Codes:     1) Clinical presentation characteristics are:   Stable/Uncomplicated.  2) Decision-Making    Low complexity using standardized patient assessment instrument and/or measureable assessment of functional outcome.  Cumulative Therapy Evaluation is: Low complexity.    Previous and current functional limitations:  (See Goal Flow Sheet for this information)    Short term and Long term goals: (See Goal Flow Sheet for this information)     Communication ability:  Patient appears to be able to clearly communicate and understand verbal and written communication and follow directions correctly.  Treatment Explanation - The following has been discussed with the patient:   RX ordered/plan of care  Anticipated outcomes  Possible risks and side effects  This patient would benefit from PT intervention to resume normal activities.   Rehab potential is good.    Frequency:  1 X week, once daily  Duration:  for 8 weeks  Discharge Plan:  Achieve all LTG.  Independent in home treatment program.  Reach maximal therapeutic benefit.    Please refer to the daily flowsheet for treatment today, total treatment time and time spent performing 1:1 timed codes.

## 2022-08-25 NOTE — PROGRESS NOTES
[facility]    OUTPATIENT Physical Therapy ORTHOPEDIC EVALUATION  PLAN OF TREATMENT FOR OUTPATIENT REHABILITATION  (COMPLETE FOR INITIAL CLAIMS ONLY)  Patient's Last Name, First Name, M.I.  YOB: 1955  Lev Cabral    Provider s Name:  [facility]   Medical Record No.  [id number]   Start of Care Date:  08/25/22   Onset Date:   05/01/22   Type:     _X__PT   ___OT Medical Diagnosis:    Encounter Diagnosis   Name Primary?    Chronic right shoulder pain         Treatment Diagnosis:  Rt RTC strain        Goals:     08/25/22 0500   Body Part   Goals listed below are for Rt shoulder pain   Goal #1   Goal #1 reaching   Previous Functional Level No restrictions   Current Functional Level to shoulder level;out to the side;across body   STG Target Performance Reach ;behind back;to shoulder level;overhead;out to the side;across body   Performance level pl 1/10 or less   Rationale for meal preparation;for safe driving, hook seat belt, reach shift lever and turn signal, using both hands on steering wheel   Due date 09/22/22   LTG Target Performance Reach;behind back;to shoulder level;overhead;out to the side;across body;Repetitive or prolonged use of arm at shoulder height for   Performance Level for archery and IADL's   Rationale for meal preparation;for safe driving, hook seat belt, reach shift lever and turn signal, using both hands on steering wheel   Due date 10/20/22       Therapy Frequency:  1x/week  Predicted Duration of Therapy Intervention:  8 weeks    Paul Villafuerte, PT                 I CERTIFY THE NEED FOR THESE SERVICES FURNISHED UNDER        THIS PLAN OF TREATMENT AND WHILE UNDER MY CARE     (Physician attestation of this document indicates review and certification of the therapy plan).                     Certification Date From:  08/25/22   Certification Date  To:  10/20/22    Referring Provider:  China Camp    Initial Assessment        See Epic Evaluation SOC Date: 08/25/22

## 2022-08-30 ENCOUNTER — THERAPY VISIT (OUTPATIENT)
Dept: PHYSICAL THERAPY | Facility: CLINIC | Age: 67
End: 2022-08-30
Payer: MEDICARE

## 2022-08-30 DIAGNOSIS — M25.511 CHRONIC RIGHT SHOULDER PAIN: Primary | ICD-10-CM

## 2022-08-30 DIAGNOSIS — G89.29 CHRONIC RIGHT SHOULDER PAIN: Primary | ICD-10-CM

## 2022-08-30 PROCEDURE — 97110 THERAPEUTIC EXERCISES: CPT | Mod: GP | Performed by: PHYSICAL THERAPIST

## 2022-08-30 PROCEDURE — 97140 MANUAL THERAPY 1/> REGIONS: CPT | Mod: GP | Performed by: PHYSICAL THERAPIST

## 2022-09-06 ENCOUNTER — THERAPY VISIT (OUTPATIENT)
Dept: PHYSICAL THERAPY | Facility: CLINIC | Age: 67
End: 2022-09-06
Payer: MEDICARE

## 2022-09-06 DIAGNOSIS — G89.29 CHRONIC RIGHT SHOULDER PAIN: Primary | ICD-10-CM

## 2022-09-06 DIAGNOSIS — M25.511 CHRONIC RIGHT SHOULDER PAIN: Primary | ICD-10-CM

## 2022-09-06 PROCEDURE — 97110 THERAPEUTIC EXERCISES: CPT | Mod: GP | Performed by: PHYSICAL THERAPIST

## 2022-09-06 PROCEDURE — 97140 MANUAL THERAPY 1/> REGIONS: CPT | Mod: GP | Performed by: PHYSICAL THERAPIST

## 2022-09-14 ENCOUNTER — OFFICE VISIT (OUTPATIENT)
Dept: ORTHOPEDICS | Facility: CLINIC | Age: 67
End: 2022-09-14

## 2022-09-14 ENCOUNTER — ANCILLARY PROCEDURE (OUTPATIENT)
Dept: GENERAL RADIOLOGY | Facility: CLINIC | Age: 67
End: 2022-09-14
Attending: ORTHOPAEDIC SURGERY
Payer: MEDICARE

## 2022-09-14 VITALS — DIASTOLIC BLOOD PRESSURE: 82 MMHG | SYSTOLIC BLOOD PRESSURE: 134 MMHG

## 2022-09-14 DIAGNOSIS — R29.898 SHOULDER WEAKNESS: ICD-10-CM

## 2022-09-14 DIAGNOSIS — F41.1 GENERALIZED ANXIETY DISORDER: Primary | ICD-10-CM

## 2022-09-14 DIAGNOSIS — M25.511 CHRONIC RIGHT SHOULDER PAIN: ICD-10-CM

## 2022-09-14 DIAGNOSIS — G89.29 CHRONIC RIGHT SHOULDER PAIN: ICD-10-CM

## 2022-09-14 PROCEDURE — 99203 OFFICE O/P NEW LOW 30 MIN: CPT | Performed by: ORTHOPAEDIC SURGERY

## 2022-09-14 PROCEDURE — 73030 X-RAY EXAM OF SHOULDER: CPT | Mod: RT | Performed by: ORTHOPAEDIC SURGERY

## 2022-09-14 RX ORDER — DIAZEPAM 5 MG
10 TABLET ORAL
Qty: 1 TABLET | Refills: 0 | Status: SHIPPED | OUTPATIENT
Start: 2022-09-14 | End: 2023-02-15

## 2022-09-14 NOTE — LETTER
9/14/2022         RE: Lev Cabral  [address] [place] [address]        Dear Colleague,    Thank you for referring your patient, Lev Cabral, to the [facility] [place]. Please see a copy of my visit note below.    HISTORY OF PRESENT ILLNESS:    Lev Cabral is a 66 year old male who is seen in consultation at the request of Dr. Camp for right shoulder pain. Onset of symptoms 5/10/22, patient reports injury after lifting weights.  Patient reports taking 3 weeks of rest, and gradual returned to lifting. He reports second incident causing pain and has not returned to the activity.  Patient is right hand dominant.    Present symptoms: Right shoulder pain, located in the anterior shoulder, and upper arm. Patient reports the shoulder feels weak.  Painful with raising arm above chest height. He notes increased discomfort when the elbow is in bent position. Patient also notes clicking and popping sounds.   Treatments tried to this point: Physical Therapy x3 sessions, seldom use of OTC pain relievers.   Orthopedic PMH: low back pain,  multilevel cervical fusion (1998), left knee arthroscopy     Patient does reports history of claustrophobia.     Past Medical History:   Diagnosis Date     Unspecified adjustment reaction        Past Surgical History:   Procedure Laterality Date     APPENDECTOMY       COLONOSCOPY  2009    Dr. Oliva     COLONOSCOPY N/A 2/9/2017    Procedure: COLONOSCOPY;  Surgeon: Amos Loving MD;  Location:  GI     ENT SURGERY      tonsils     ORTHOPEDIC SURGERY  1999    neck fusion- 3 level     ORTHOPEDIC SURGERY Left     left knee arthroscopic     REPAIR CHEST WALL N/A 12/1/2017    Procedure: REPAIR CHEST WALL;  RESECTION OF LEFT ANTERIOR CHEST WALL MASS AND RECONSTRUCTION WITH GOR-TIM SOFT TISSUE PATCH, PRIMARY REPAIR OF DIAPHRAGM;  Surgeon: Faizan Sterling MD;  Location:  OR       Family History   Problem Relation Age of Onset      Cancer Father         brain tumor     Diabetes Father      Diabetes Mother         Type 2     Colon Cancer No family hx of        Social History     Socioeconomic History     Marital status:      Spouse name: Not on file     Number of children: Not on file     Years of education: Not on file     Highest education level: Not on file   Occupational History     Not on file   Tobacco Use     Smoking status: Never Smoker     Smokeless tobacco: Never Used   Vaping Use     Vaping Use: Never used   Substance and Sexual Activity     Alcohol use: No     Alcohol/week: 0.0 standard drinks     Drug use: No     Sexual activity: Yes     Partners: Female   Other Topics Concern     Parent/sibling w/ CABG, MI or angioplasty before 65F 55M? No   Social History Narrative     Not on file     Social Determinants of Health     Financial Resource Strain: Not on file   Food Insecurity: Not on file   Transportation Needs: Not on file   Physical Activity: Not on file   Stress: Not on file   Social Connections: Not on file   Intimate Partner Violence: Not on file   Housing Stability: Not on file       Current Outpatient Medications   Medication Sig Dispense Refill     psyllium (METAMUCIL) 58.6 % POWD Take 2 Tablespoonful by mouth daily       sildenafil (REVATIO) 20 MG tablet TAKE TWO TO FIVE TABLETS BY MOUTH 30 MINUTES TO 4 HOURS BEFORE SEX. 30 tablet 0     vitamin B-12 (CYANOCOBALAMIN) 2500 MCG sublingual tablet Take 2,500 mcg by mouth daily       VITAMIN D, CHOLECALCIFEROL, PO Take 2,000 Units by mouth daily          No Known Allergies    REVIEW OF SYSTEMS:  CONSTITUTIONAL:  NEGATIVE for fever, chills, change in weight  INTEGUMENTARY/SKIN:  NEGATIVE for worrisome rashes, moles or lesions  EYES:  NEGATIVE for vision changes or irritation  ENT/MOUTH:  NEGATIVE for ear, mouth and throat problems  RESP:  NEGATIVE for significant cough or SOB  BREAST:  NEGATIVE for masses, tenderness or discharge  CV:  NEGATIVE for chest pain,  palpitations or peripheral edema  GI:  constipation  :  Negative   MUSCULOSKELETAL:  See HPI above  NEURO:  Weakness and paresthesia of the right leg  ENDOCRINE:  NEGATIVE for temperature intolerance, skin/hair changes  HEME/ALLERGY/IMMUNE:  NEGATIVE for bleeding problems  PSYCHIATRIC: claustrophobia, anxiety    PHYSICAL EXAM:  /82 (BP Location: Right arm, Patient Position: Chair, Cuff Size: Adult Regular)   There is no height or weight on file to calculate BMI.   GENERAL APPEARANCE: healthy, alert and no distress   HEENT: No apparent thyroid megaly. Clear sclera with normal ocular movement  RESPIRATORY: No labored breathing  SKIN: no suspicious lesions or rashes  NEURO: Normal strength and tone, mentation intact and speech normal  VASCULAR: Good pulses, and capillary refill   LYMPH: no lymphadenopathy   PSYCH:  mentation appears normal and affect normal/bright    MUSCULOSKELETAL:  Not in acute distress    Noticeable atrophy of the right suprascapular region, right compared to left consistent with supraspinatus atrophy  Active range of motion is full and symmetrical  With active abduction, noticeable thoracoscapular compensatory movement is noted with elevation of the scapula  Positive arm drop test, right  Significant weakness of external rotation isometric strength, right  Belly press test is negative    Left shoulder examination is unremarkable     Generalized atrophy of the right upper arm which apparently is chronic and secondary to prior neck issue  The biceps muscle mass on the right is not as well-defined    For follow-up sensory function is intact  Circulation is intact      ASSESSMENT:  Chronic right rotator cuff tear  Advanced AC joint DJD  Minimal glenohumeral joint DJD    PLAN:  The x-ray images from today were visualized.  Findings are thoroughly explained.  Examination findings were also thoroughly informed.    His situation was consistent with chronic rotator cuff tear mostly involving  supraspinatus and possibly infraspinatus.    We recommended MRI scan evaluation to assess the extent of the tear.  Valium 10 mg was prescribed to be used right before MRI scan is performed because of his anxiety.    Will discuss future treatment options when he returns to review the MRI scan.    .    Imaging Interpretation:   Chronic AC joint DJD and minimal glenohumeral joint DJD.  No acute fractures otherwise.      Vega Morrison MD  Department of Orthopedic Surgery        Disclaimer: This note consists of symbols derived from keyboarding, dictation and/or voice recognition software. As a result, there may be errors in the script that have gone undetected. Please consider this when interpreting information found in this chart.        Again, thank you for allowing me to participate in the care of your patient.        Sincerely,        Vega Morrison MD

## 2022-09-14 NOTE — PATIENT INSTRUCTIONS
1. Generalized anxiety disorder    2. Chronic right shoulder pain    3. Shoulder weakness    MRI scan of RIGHT shoulder has been ordered.   The [facility] team will call you to schedule your imaging exam in the next 1-2 days. You can also call them directly at [facility] [phone] ([address], [address], [place], [place]) to schedule your appointment.    Bring Valium to MRI appointment, MRI staff will instruct when to take medication to be most effective for exam.     Follow up with Dr. Morrison after completion of MRI scan to review findings and discuss treatment recommendations.    Call my office with any questions or concerns, [phone].

## 2022-09-14 NOTE — PROGRESS NOTES
HISTORY OF PRESENT ILLNESS:    Lev Cabral is a 66 year old male who is seen in consultation at the request of Dr. Camp for right shoulder pain. Onset of symptoms 5/10/22, patient reports injury after lifting weights.  Patient reports taking 3 weeks of rest, and gradual returned to lifting. He reports second incident causing pain and has not returned to the activity.  Patient is right hand dominant.    Present symptoms: Right shoulder pain, located in the anterior shoulder, and upper arm. Patient reports the shoulder feels weak.  Painful with raising arm above chest height. He notes increased discomfort when the elbow is in bent position. Patient also notes clicking and popping sounds.   Treatments tried to this point: Physical Therapy x3 sessions, seldom use of OTC pain relievers.   Orthopedic PMH: low back pain,  multilevel cervical fusion (1998), left knee arthroscopy     Patient does reports history of claustrophobia.     Past Medical History:   Diagnosis Date     Unspecified adjustment reaction        Past Surgical History:   Procedure Laterality Date     APPENDECTOMY       COLONOSCOPY  2009    Dr. Oliva     COLONOSCOPY N/A 2/9/2017    Procedure: COLONOSCOPY;  Surgeon: Amos Loving MD;  Location:  GI     ENT SURGERY      tonsils     ORTHOPEDIC SURGERY  1999    neck fusion- 3 level     ORTHOPEDIC SURGERY Left     left knee arthroscopic     REPAIR CHEST WALL N/A 12/1/2017    Procedure: REPAIR CHEST WALL;  RESECTION OF LEFT ANTERIOR CHEST WALL MASS AND RECONSTRUCTION WITH GOR-TIM SOFT TISSUE PATCH, PRIMARY REPAIR OF DIAPHRAGM;  Surgeon: Faizan Sterling MD;  Location:  OR       Family History   Problem Relation Age of Onset     Cancer Father         brain tumor     Diabetes Father      Diabetes Mother         Type 2     Colon Cancer No family hx of        Social History     Socioeconomic History     Marital status:      Spouse name: Not on file     Number of children: Not on file      Years of education: Not on file     Highest education level: Not on file   Occupational History     Not on file   Tobacco Use     Smoking status: Never Smoker     Smokeless tobacco: Never Used   Vaping Use     Vaping Use: Never used   Substance and Sexual Activity     Alcohol use: No     Alcohol/week: 0.0 standard drinks     Drug use: No     Sexual activity: Yes     Partners: Female   Other Topics Concern     Parent/sibling w/ CABG, MI or angioplasty before 65F 55M? No   Social History Narrative     Not on file     Social Determinants of Health     Financial Resource Strain: Not on file   Food Insecurity: Not on file   Transportation Needs: Not on file   Physical Activity: Not on file   Stress: Not on file   Social Connections: Not on file   Intimate Partner Violence: Not on file   Housing Stability: Not on file       Current Outpatient Medications   Medication Sig Dispense Refill     psyllium (METAMUCIL) 58.6 % POWD Take 2 Tablespoonful by mouth daily       sildenafil (REVATIO) 20 MG tablet TAKE TWO TO FIVE TABLETS BY MOUTH 30 MINUTES TO 4 HOURS BEFORE SEX. 30 tablet 0     vitamin B-12 (CYANOCOBALAMIN) 2500 MCG sublingual tablet Take 2,500 mcg by mouth daily       VITAMIN D, CHOLECALCIFEROL, PO Take 2,000 Units by mouth daily          No Known Allergies    REVIEW OF SYSTEMS:  CONSTITUTIONAL:  NEGATIVE for fever, chills, change in weight  INTEGUMENTARY/SKIN:  NEGATIVE for worrisome rashes, moles or lesions  EYES:  NEGATIVE for vision changes or irritation  ENT/MOUTH:  NEGATIVE for ear, mouth and throat problems  RESP:  NEGATIVE for significant cough or SOB  BREAST:  NEGATIVE for masses, tenderness or discharge  CV:  NEGATIVE for chest pain, palpitations or peripheral edema  GI:  constipation  :  Negative   MUSCULOSKELETAL:  See HPI above  NEURO:  Weakness and paresthesia of the right leg  ENDOCRINE:  NEGATIVE for temperature intolerance, skin/hair changes  HEME/ALLERGY/IMMUNE:  NEGATIVE for bleeding  problems  PSYCHIATRIC: claustrophobia, anxiety    PHYSICAL EXAM:  /82 (BP Location: Right arm, Patient Position: Chair, Cuff Size: Adult Regular)   There is no height or weight on file to calculate BMI.   GENERAL APPEARANCE: healthy, alert and no distress   HEENT: No apparent thyroid megaly. Clear sclera with normal ocular movement  RESPIRATORY: No labored breathing  SKIN: no suspicious lesions or rashes  NEURO: Normal strength and tone, mentation intact and speech normal  VASCULAR: Good pulses, and capillary refill   LYMPH: no lymphadenopathy   PSYCH:  mentation appears normal and affect normal/bright    MUSCULOSKELETAL:  Not in acute distress    Noticeable atrophy of the right suprascapular region, right compared to left consistent with supraspinatus atrophy  Active range of motion is full and symmetrical  With active abduction, noticeable thoracoscapular compensatory movement is noted with elevation of the scapula  Positive arm drop test, right  Significant weakness of external rotation isometric strength, right  Belly press test is negative    Left shoulder examination is unremarkable     Generalized atrophy of the right upper arm which apparently is chronic and secondary to prior neck issue  The biceps muscle mass on the right is not as well-defined    For follow-up sensory function is intact  Circulation is intact      ASSESSMENT:  Chronic right rotator cuff tear  Advanced AC joint DJD  Minimal glenohumeral joint DJD    PLAN:  The x-ray images from today were visualized.  Findings are thoroughly explained.  Examination findings were also thoroughly informed.    His situation was consistent with chronic rotator cuff tear mostly involving supraspinatus and possibly infraspinatus.    We recommended MRI scan evaluation to assess the extent of the tear.  Valium 10 mg was prescribed to be used right before MRI scan is performed because of his anxiety.    Will discuss future treatment options when he returns to  review the MRI scan.    .    Imaging Interpretation:   Chronic AC joint DJD and minimal glenohumeral joint DJD.  No acute fractures otherwise.      Vega Morrison MD  Department of Orthopedic Surgery        Disclaimer: This note consists of symbols derived from keyboarding, dictation and/or voice recognition software. As a result, there may be errors in the script that have gone undetected. Please consider this when interpreting information found in this chart.

## 2022-10-16 ENCOUNTER — HEALTH MAINTENANCE LETTER (OUTPATIENT)
Age: 67
End: 2022-10-16

## 2022-10-22 DIAGNOSIS — N52.9 ERECTILE DYSFUNCTION, UNSPECIFIED ERECTILE DYSFUNCTION TYPE: ICD-10-CM

## 2022-10-24 RX ORDER — SILDENAFIL CITRATE 20 MG/1
TABLET ORAL
Qty: 30 TABLET | Refills: 0 | Status: SHIPPED | OUTPATIENT
Start: 2022-10-24 | End: 2023-01-10

## 2022-11-10 ENCOUNTER — OFFICE VISIT (OUTPATIENT)
Dept: FAMILY MEDICINE | Facility: CLINIC | Age: 67
End: 2022-11-10
Payer: MEDICARE

## 2022-11-10 VITALS
BODY MASS INDEX: 30.59 KG/M2 | DIASTOLIC BLOOD PRESSURE: 84 MMHG | WEIGHT: 218.5 LBS | OXYGEN SATURATION: 95 % | SYSTOLIC BLOOD PRESSURE: 134 MMHG | TEMPERATURE: 98.2 F | HEIGHT: 71 IN | RESPIRATION RATE: 16 BRPM | HEART RATE: 68 BPM

## 2022-11-10 DIAGNOSIS — M54.2 CERVICALGIA: ICD-10-CM

## 2022-11-10 DIAGNOSIS — Z00.00 ENCOUNTER FOR WELLNESS EXAMINATION: Primary | ICD-10-CM

## 2022-11-10 DIAGNOSIS — Z12.5 SCREENING FOR PROSTATE CANCER: ICD-10-CM

## 2022-11-10 DIAGNOSIS — Z23 HIGH PRIORITY FOR 2019-NCOV VACCINE: ICD-10-CM

## 2022-11-10 DIAGNOSIS — F43.23 ADJUSTMENT DISORDER WITH MIXED ANXIETY AND DEPRESSED MOOD: ICD-10-CM

## 2022-11-10 DIAGNOSIS — Z23 NEED FOR VACCINATION: ICD-10-CM

## 2022-11-10 DIAGNOSIS — C41.3 CHONDROSARCOMA OF RIBS (H): ICD-10-CM

## 2022-11-10 DIAGNOSIS — G89.29 CHRONIC RIGHT SHOULDER PAIN: ICD-10-CM

## 2022-11-10 DIAGNOSIS — M25.511 CHRONIC RIGHT SHOULDER PAIN: ICD-10-CM

## 2022-11-10 DIAGNOSIS — I77.810 MILD ASCENDING AORTA DILATION (H): ICD-10-CM

## 2022-11-10 PROCEDURE — G0439 PPPS, SUBSEQ VISIT: HCPCS | Performed by: PHYSICIAN ASSISTANT

## 2022-11-10 PROCEDURE — 0134A COVID-19,PF,MODERNA BIVALENT: CPT | Performed by: PHYSICIAN ASSISTANT

## 2022-11-10 PROCEDURE — 80048 BASIC METABOLIC PNL TOTAL CA: CPT | Performed by: PHYSICIAN ASSISTANT

## 2022-11-10 PROCEDURE — 36415 COLL VENOUS BLD VENIPUNCTURE: CPT | Performed by: PHYSICIAN ASSISTANT

## 2022-11-10 PROCEDURE — G0008 ADMIN INFLUENZA VIRUS VAC: HCPCS | Mod: 59 | Performed by: PHYSICIAN ASSISTANT

## 2022-11-10 PROCEDURE — 99214 OFFICE O/P EST MOD 30 MIN: CPT | Mod: 25 | Performed by: PHYSICIAN ASSISTANT

## 2022-11-10 PROCEDURE — G0103 PSA SCREENING: HCPCS | Performed by: PHYSICIAN ASSISTANT

## 2022-11-10 PROCEDURE — 90662 IIV NO PRSV INCREASED AG IM: CPT | Performed by: PHYSICIAN ASSISTANT

## 2022-11-10 PROCEDURE — 91313 COVID-19,PF,MODERNA BIVALENT: CPT | Performed by: PHYSICIAN ASSISTANT

## 2022-11-10 PROCEDURE — 80061 LIPID PANEL: CPT | Performed by: PHYSICIAN ASSISTANT

## 2022-11-10 ASSESSMENT — PAIN SCALES - GENERAL: PAINLEVEL: MODERATE PAIN (4)

## 2022-11-10 ASSESSMENT — PATIENT HEALTH QUESTIONNAIRE - PHQ9
SUM OF ALL RESPONSES TO PHQ QUESTIONS 1-9: 14
10. IF YOU CHECKED OFF ANY PROBLEMS, HOW DIFFICULT HAVE THESE PROBLEMS MADE IT FOR YOU TO DO YOUR WORK, TAKE CARE OF THINGS AT HOME, OR GET ALONG WITH OTHER PEOPLE: SOMEWHAT DIFFICULT
SUM OF ALL RESPONSES TO PHQ QUESTIONS 1-9: 14

## 2022-11-10 NOTE — PROGRESS NOTES
"SUBJECTIVE:   TISHA is a 67 year old who presents for Preventive Visit.      Patient has been advised of split billing requirements and indicates understanding: Yes  Are you in the first 12 months of your Medicare coverage?  No    History of Present Illness       Reason for visit:  Neck back shoulder  Symptom onset:  More than a month  Symptoms include:  Neck back shoulder  Symptom intensity:  Moderate  Symptom progression:  Staying the same  Had these symptoms before:  Yes  Has tried/received treatment for these symptoms:  Yes  Previous treatment was successful:  No  What makes it worse:  No  What makes it better:  No    He eats 0-1 servings of fruits and vegetables daily.He consumes 0 sweetened beverage(s) daily.He exercises with enough effort to increase his heart rate 9 or less minutes per day.  He exercises with enough effort to increase his heart rate 3 or less days per week.   He is taking medications regularly.    Today's PHQ-9         PHQ-9 Total Score: 14    PHQ-9 Q9 Thoughts of better off dead/self-harm past 2 weeks :   Not at all    How difficult have these problems made it for you to do your work, take care of things at home, or get along with other people: Somewhat difficult    Lev Cabral is a 67 year old male who presents today for check up  He \"has a bunch of problems to discuss\"  Was doing a lot of working out and irritated the shoulder - mentions rotator cuff   Needs MRI but is terrified (claustrophobia)    Also injured his back working out - has significant pain into the right leg and going out on him at times  Did see [facility] Molina [facility]; xray showed limited disc space  Needs MRI as well    Unfortunately mentions his anxiety has significantly risen over the past few years  Struggling even at the dentist    Sleeping more during the day, not at night  Low energy levels  Does mention hx of depressive symptoms previously but never was treated; could just push through  Has talked to spouse " quite a bit about things - knows he needs help    Do you feel safe in your environment? Yes    Have you ever done Advance Care Planning? (For example, a Health Directive, POLST, or a discussion with a medical provider or your loved ones about your wishes): No, advance care planning information given to patient to review.  Patient declined advance care planning discussion at this time.       Fall risk  Fallen 2 or more times in the past year?: No  Any fall with injury in the past year?: No    Cognitive Screening   1) Repeat 3 items (Leader, Season, Table)    2) Clock draw: NORMAL  3) 3 item recall: Recalls 3 objects  Results: 3 items recalled: COGNITIVE IMPAIRMENT LESS LIKELY    Mini-CogTM Copyright S Alena. Licensed by the author for use in [facility]; reprinted with permission ([email or web address]). All rights reserved.      Do you have sleep apnea, excessive snoring or daytime drowsiness?: yes    Reviewed and updated as needed this visit by clinical staff   Tobacco  Allergies  Meds  Problems  Med Hx  Surg Hx  Fam Hx  Soc   Hx        Reviewed and updated as needed this visit by Provider                 Social History     Tobacco Use     Smoking status: Never     Smokeless tobacco: Never   Substance Use Topics     Alcohol use: No     Alcohol/week: 0.0 standard drinks     If you drink alcohol do you typically have >3 drinks per day or >7 drinks per week? Not applicable    Alcohol Use 7/21/2017   Prescreen: >3 drinks/day or >7 drinks/week? The patient does not drink >3 drinks per day nor >7 drinks per week.   No flowsheet data found.          Current providers sharing in care for this patient include:   Patient Care Team:  Cr Corley PA-C as PCP - General (Physician Assistant)  Cr Corley PA-C as Assigned PCP  Vega Morrison MD as Assigned Musculoskeletal Provider    The following health maintenance items are reviewed in Epic and correct as of today:  Health Maintenance   Topic  "Date Due     AORTIC ANEURYSM SCREENING (SYSTEM ASSIGNED)  Never done     Pneumococcal Vaccine: 65+ Years (2 - PCV) 01/17/2021     COVID-19 Vaccine (5 - Booster for Moderna series) 08/01/2022     ANNUAL REVIEW OF HM ORDERS  08/31/2022     MEDICARE ANNUAL WELLNESS VISIT  09/01/2022     INFLUENZA VACCINE (1) 09/01/2022     FALL RISK ASSESSMENT  11/10/2023     DTAP/TDAP/TD IMMUNIZATION (3 - Td or Tdap) 05/01/2024     LIPID  01/17/2025     ADVANCE CARE PLANNING  09/01/2026     COLORECTAL CANCER SCREENING  02/09/2027     HEPATITIS C SCREENING  Completed     PHQ-2 (once per calendar year)  Completed     ZOSTER IMMUNIZATION  Completed     IPV IMMUNIZATION  Aged Out     MENINGITIS IMMUNIZATION  Aged Out       Lab work is in process  Labs reviewed in EPIC      Review of Systems  Constitutional, HEENT, cardiovascular, pulmonary, gi and gu systems are negative, except as otherwise noted.    OBJECTIVE:   /84 (BP Location: Right arm, Patient Position: Sitting, Cuff Size: Adult Regular)   Pulse 68   Temp 98.2  F (36.8  C) (Oral)   Resp 16   Ht 1.816 m (5' 11.5\")   Wt 99.1 kg (218 lb 8 oz)   SpO2 95%   BMI 30.05 kg/m   Estimated body mass index is 30.05 kg/m  as calculated from the following:    Height as of this encounter: 1.816 m (5' 11.5\").    Weight as of this encounter: 99.1 kg (218 lb 8 oz).  Physical Exam  GENERAL: healthy and alert  EYES: Eyes grossly normal to inspection, PERRL and conjunctivae and sclerae normal  HENT: ear canals and TM's normal, nose and mouth without ulcers or lesions  RESP: lungs clear to auscultation - no rales, rhonchi or wheezes  BREAST/CHEST: did not palpate at todays visit  CV: regular rates and rhythm  ABDOMEN: soft, nontender, without hepatosplenomegaly or masses and bowel sounds normal  MS: No peripheral edema; did not examine neck, shoulder or knee  NEURO: rom right arm is limited 2/2 pain/strength  PSYCH: mentation appears normal, affect flat and tearful    Diagnostic Test " "Results:  Labs reviewed in Epic  Updating today     ASSESSMENT / PLAN:   1. Encounter for wellness examination  Reviewed personal and family history. Reviewed age appropriate screenings. Recommended any needed vaccinations.  - Lipid panel reflex to direct LDL Fasting; Future  - Basic metabolic panel  (Ca, Cl, CO2, Creat, Gluc, K, Na, BUN); Future  - Lipid panel reflex to direct LDL Fasting  - Basic metabolic panel  (Ca, Cl, CO2, Creat, Gluc, K, Na, BUN)    2. Adjustment disorder with mixed anxiety and depressed mood  He has suffered periodically his entire life though far worse recently; he does not think he would like to do therapy but is open to medication trial. Reviewed r/b/se. Follow up 6 weeks for review  - FLUoxetine (PROZAC) 20 MG capsule; Take 1 capsule (20 mg) by mouth daily Ok to increase to 40mg after 4 weeks as needed  Dispense: 90 capsule; Refill: 0    3. Chronic right shoulder pain  He is following with Dr. Morrison. Anxiety/claustrophobia is severely limiting for work up (MRI) though he was provided valium. We reviewed it worthwhile to see if he can get MRIs of this knees, neck and shoulder together and he'll look into that     4. Cervicalgia  He is going to follow up with [facility]; there is some radicular aspect to this as well    5. Screening for prostate cancer  udpating  - PSA, screen; Future  - PSA, screen    6. Chondrosarcoma of ribs (H)  Has imaging follow up in early 2023.     7. Mild ascending aorta dilation (H)  Will follow up with CT chest per #6    8. High priority for 2019-nCoV vaccine  - COVID-19,PF,MODERNA BIVALENT 18+Yrs    9. Need for vaccination  - INFLUENZA, QUAD, HD, PF, 65+ (FLUZONE HD)      COUNSELING:  Reviewed preventive health counseling, as reflected in patient instructions    Estimated body mass index is 30.05 kg/m  as calculated from the following:    Height as of this encounter: 1.816 m (5' 11.5\").    Weight as of this encounter: 99.1 kg (218 lb 8 oz).      He reports that he has " never smoked. He has never used smokeless tobacco.      Appropriate preventive services were discussed with this patient, including applicable screening as appropriate for cardiovascular disease, diabetes, osteopenia/osteoporosis, and glaucoma.  As appropriate for age/gender, discussed screening for colorectal cancer, prostate cancer, breast cancer, and cervical cancer. Checklist reviewing preventive services available has been given to the patient.    Reviewed patients plan of care and provided an AVS. The Basic Care Plan (routine screening as documented in Health Maintenance) for Lev meets the Care Plan requirement. This Care Plan has been established and reviewed with the Patient.    Counseling Resources:  ATP IV Guidelines  Pooled Cohorts Equation Calculator  Breast Cancer Risk Calculator  Breast Cancer: Medication to Reduce Risk  FRAX Risk Assessment  ICSI Preventive Guidelines  Dietary Guidelines for Americans, 2010  USDA's MyPlate  ASA Prophylaxis  Lung CA Screening    GUY Aguilar [facility]    Identified Health Risks:

## 2022-11-10 NOTE — PATIENT INSTRUCTIONS
Lets have you first get the opinions of the neck folks; then can find out from [facility] to see if getting ALL MRI at the same time is feasible. I do think the valium (with a ride from your wife to get you back home) would probably help enough for you to get it done. Then once you have all the info on recommendations for the shoulder, back and neck you can talk with them about the recommended order of procedures

## 2022-11-11 LAB
ANION GAP SERPL CALCULATED.3IONS-SCNC: 5 MMOL/L (ref 3–14)
BUN SERPL-MCNC: 17 MG/DL (ref 7–30)
CALCIUM SERPL-MCNC: 9.2 MG/DL (ref 8.5–10.1)
CHLORIDE BLD-SCNC: 108 MMOL/L (ref 94–109)
CHOLEST SERPL-MCNC: 163 MG/DL
CO2 SERPL-SCNC: 26 MMOL/L (ref 20–32)
CREAT SERPL-MCNC: 0.89 MG/DL (ref 0.66–1.25)
FASTING STATUS PATIENT QL REPORTED: YES
GFR SERPL CREATININE-BSD FRML MDRD: >90 ML/MIN/1.73M2
GLUCOSE BLD-MCNC: 107 MG/DL (ref 70–99)
HDLC SERPL-MCNC: 57 MG/DL
LDLC SERPL CALC-MCNC: 82 MG/DL
NONHDLC SERPL-MCNC: 106 MG/DL
POTASSIUM BLD-SCNC: 4.4 MMOL/L (ref 3.4–5.3)
PSA SERPL-MCNC: 1.06 UG/L (ref 0–4)
SODIUM SERPL-SCNC: 139 MMOL/L (ref 133–144)
TRIGL SERPL-MCNC: 121 MG/DL

## 2022-12-02 ENCOUNTER — TELEPHONE (OUTPATIENT)
Dept: ORTHOPEDICS | Facility: CLINIC | Age: 67
End: 2022-12-02

## 2022-12-02 NOTE — TELEPHONE ENCOUNTER
Patient called and is hoping Dr. Morrison's team can fax his Shoulder MRI order to [facility] at [phone].    Thank you!

## 2022-12-02 NOTE — TELEPHONE ENCOUNTER
Order was faxed to [facility] per patient request.   Left voicemail to notify patient orders were faxed.   Provided callback number to [facility].     [employer] message was also sent to notify patient.    Trisha Reese, ATC

## 2022-12-28 PROBLEM — G89.29 CHRONIC RIGHT SHOULDER PAIN: Status: RESOLVED | Noted: 2022-08-25 | Resolved: 2022-12-28

## 2022-12-28 PROBLEM — M25.511 CHRONIC RIGHT SHOULDER PAIN: Status: RESOLVED | Noted: 2022-08-25 | Resolved: 2022-12-28

## 2022-12-28 NOTE — PROGRESS NOTES
Discharge Note    Progress reporting period is from initial evaluation date (please see noted date below) to Sep 6, 2022.  Linked Episodes   Type: Episode: Status: Noted: Resolved: Last update: Updated by:   PHYSICAL THERAPY Shoulderpain8/25/22 Active 8/25/2022 9/6/2022  8:29 AM Paul Villafuerte, PT      Comments:       Please see information below for last relevant information on current status.  Patient seen for 3 visits.    SUBJECTIVE  Subjective changes noted by patient:  Shot his bow yesterday, 100+ shots, one hour.  First time in a few weeks.  .  Current pain level is  .     Previous pain level was  3/10.   Changes in function:  Yes (See Goal flowsheet attached for changes in current functional level)  Adverse reaction to treatment or activity: None    OBJECTIVE  Changes noted in objective findings: +RIT abd, - ER.  added ext strengthening.  rows were mildly painful, did not add to HEP.     ASSESSMENT/PLAN  Diagnosis: Rt RTC strain   Updated problem list and treatment plan:   Decreased function - HEP  Decreased strength - HEP  STG/LTGs have been met or progress has been made towards goals:  Yes, please see goal flowsheet for most current information  Assessment of Progress: current status is unknown.    Last current status: Pt is progressing slower than anticipated   Self Management Plans:  HEP  I have re-evaluated this patient and find that the nature, scope, duration and intensity of the therapy is appropriate for the medical condition of the patient.  Lev continues to require the following intervention to meet STG and LTG's:  HEP.    Recommendations:  Discharge with current home program.  Patient to follow up with MD as needed.    Please refer to the daily flowsheet for treatment today, total treatment time and time spent performing 1:1 timed codes.

## 2023-01-05 ENCOUNTER — TRANSFERRED RECORDS (OUTPATIENT)
Dept: HEALTH INFORMATION MANAGEMENT | Facility: CLINIC | Age: 68
End: 2023-01-05

## 2023-01-06 ENCOUNTER — TELEPHONE (OUTPATIENT)
Dept: ORTHOPEDICS | Facility: CLINIC | Age: 68
End: 2023-01-06

## 2023-01-06 NOTE — TELEPHONE ENCOUNTER
Patient was previously evaluated by Dr. Morrison on 9/14/22, at this time MRI scan of the shoulder was recommended for further evaluation. Patient was advised to follow up after completion of MRI to discuss findings.     On 12/2/22 patient requested MRI order to be faxed to [facility].       Right shoulder MRI results received from [facility].   MRI was completed on 1/5/23.     Findings from report:   1. Marked supraspinatus tendinopathy with a 1.5x1.4 cm area of intermediate-high-grade partial -thickness bursal surface tearing at the anterior myotendinous junction.   2. Mild infraspinatus and subscapularis tendinopathy, without tear.   3. Marked AC joint arthropathy with inferior osteophytosis, which effaces the underlying supraspinatus.  Additionally, there is moderate subacromial-subdeltoid bursal inflammation.  However, the acromiohumeral space is normal.   4. Findings in keeping with any clinical symptoms of adhesive capsulitis.   5. Circumferential degeneration and fraying of the labrum, which is doubtful clinical significances.   6. No tendinopathy, displacement, or tear of the biceps long head tendon.   7. No full-thickness chondral defect or evidence of glenohumeral joint osteoarthritis.     Images available in PACS.     Please review findings and advise who patient should follow-up with in Dr. Morrison's absence.     Trisha Reese ATC

## 2023-01-07 DIAGNOSIS — N52.9 ERECTILE DYSFUNCTION, UNSPECIFIED ERECTILE DYSFUNCTION TYPE: ICD-10-CM

## 2023-01-10 RX ORDER — SILDENAFIL CITRATE 20 MG/1
TABLET ORAL
Qty: 30 TABLET | Refills: 0 | Status: SHIPPED | OUTPATIENT
Start: 2023-01-10 | End: 2023-03-28

## 2023-01-13 NOTE — TELEPHONE ENCOUNTER
Patient called to schedule follow up to review results.   Patient elected to schedule follow up with Trae Brady, and can follow up with Dr. Parks upon her return to the office.     Trisha Reese, ATC

## 2023-01-19 ENCOUNTER — HOSPITAL ENCOUNTER (OUTPATIENT)
Dept: CT IMAGING | Facility: CLINIC | Age: 68
Discharge: HOME | End: 2023-01-19
Attending: THORACIC SURGERY (CARDIOTHORACIC VASCULAR SURGERY) | Admitting: THORACIC SURGERY (CARDIOTHORACIC VASCULAR SURGERY)
Payer: MEDICARE

## 2023-01-19 DIAGNOSIS — C41.9 CHONDROSARCOMA (H): ICD-10-CM

## 2023-01-19 DIAGNOSIS — Q25.44 CONGENITAL DILATION OF ASCENDING AORTA: ICD-10-CM

## 2023-01-19 PROCEDURE — 71250 CT THORAX DX C-: CPT

## 2023-01-23 ENCOUNTER — OFFICE VISIT (OUTPATIENT)
Dept: ORTHOPEDICS | Facility: CLINIC | Age: 68
End: 2023-01-23
Payer: MEDICARE

## 2023-01-23 DIAGNOSIS — G89.29 CHRONIC RIGHT SHOULDER PAIN: Primary | ICD-10-CM

## 2023-01-23 DIAGNOSIS — M75.111 INCOMPLETE TEAR OF RIGHT ROTATOR CUFF, UNSPECIFIED WHETHER TRAUMATIC: ICD-10-CM

## 2023-01-23 DIAGNOSIS — M25.511 CHRONIC RIGHT SHOULDER PAIN: Primary | ICD-10-CM

## 2023-01-23 DIAGNOSIS — M19.011 DEGENERATIVE JOINT DISEASE OF RIGHT ACROMIOCLAVICULAR JOINT: ICD-10-CM

## 2023-01-23 PROCEDURE — 99214 OFFICE O/P EST MOD 30 MIN: CPT | Performed by: PHYSICIAN ASSISTANT

## 2023-01-23 NOTE — PROGRESS NOTES
ASSESSMENT & PLAN    Lev was seen today for results.    Diagnoses and all orders for this visit:    Chronic right shoulder pain    Incomplete tear of right rotator cuff, unspecified whether traumatic    Degenerative joint disease of right acromioclavicular joint      This issue is chronic and Unchanged.    We reviewed the shoulder MRI results with Alejo today.    No clinical signs of adhesive capsulitis.  AC joint does not appear to be symptomatic today.  Partial-thickness supraspinatus tear;   -We discussed given his high level of pain-free range of motion, limited pain and evidence of rotator cuff fatigue, best course of action at this time would be physical therapy to rehabilitate the cuff.    We did discuss potentially addressing the rotator cuff and AC joint surgically in the future taking into consideration cervical spine contribution and failure of rehabilitation..    Joe Brady PA-C  [facility] [place]    SUBJECTIVE- Interim History January 23, 2023    Chief Complaint   Patient presents with     Right Shoulder - Results       Follow up with MRI results of right shoulder       Lev Cabral is a 67 year old male who is seen in f/u up for    Chronic right shoulder pain  Incomplete tear of right rotator cuff, unspecified whether traumatic  Degenerative joint disease of right acromioclavicular joint.     Since last visit with Dr. Vega Morrison MD on 9/14/2022 patient states he is relatively unchanged..  - Now ~8 months from initial onset, weightlifting incident DOI May 2022.    Patient states he continues to have weakness at the shoulder.  Is unable to draw back his bow or lift weights.  Does feel crepitus at the shoulder with motion.  May have occasional pains at the shoulder with certain motions such as lifting out to the side or rotation of the arm with arm abducted.  Has no night pain or pain at rest.  Pain is mostly at the lateral shoulder without  radicular.    Worsened by: Certain motions.  Better with: Nothing nothing  Treatments tried: PT x3 visits, last visit 9/6/2022, very limited follow-up exercises at home.  Associated symptoms: Denies hypermobility, instability, radicular symptoms, numbness tingling.    The patient is seen by themselves.  The patient is Right handed    Orthopedic/Surgical history: Cervical spine fusion.    No family history pertinent to patient's problem today.   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      OBJECTIVE:  There were no vitals taken for this visit.     GENERAL APPEARANCE: healthy, alert and no distress   GAIT: NORMAL  SKIN: no suspicious lesions or rashes  HEENT: Sclera clear, anicteric  CV: no lower extremity edema, good peripheral pulses  RESP: Breathing not labored  NEURO: Normal strength and tone, mentation intact and speech normal  PSYCH:  mentation appears normal and affect normal/bright.    MSK exam:  Right shoulder  Gross: Mild right shoulder girdle muscular atrophy when compared with the left especially within setting of right hand dominant.  Mild AC joint prominence.  Posturally after performing range of motion exam his shoulder starts to elevate.  No sulcus sign, AC separation signs, lesions, scars.  AROM: Right shoulder: Flexion 180, abduction 170, IR to lumbar, ER 50, adduction full.  Left shoulder: Flexion 180, abduction 180, IR to T-spine, ER 50, adduction full.  PROM: Not necessarily given full range of motion actively.  Palpation: Crepitus at AC joint with shoulder motions, relatively nonpainful during motions, no pain with pressure.  Trapezius and rhomboids with increased tone versus left.  Mild pain over supraspinatus insertion with arm at neutral.  No pain over biceps tendon in the groove.  Strength: Abduction 4+/5 with 90 degrees of abduction, external rotation 4+/5,  flexion/internal rotation/extension all 5 out of 5.  Left shoulder 5 out of 5 all planes.  Special tests: Drop arm positive for mild pain and some weakness versus left.  Hornblower's negative, empty can negative, belly press negative, IR lift off test mildly positive with mild pain at the shoulder.    RADIOLOGY:  Patient follows up with outside right shoulder MRI without contrast, dated 1/5/2023.  I do not have the images today however the report is scanned into epic.    Summary by Arben Weinberg MD on 1/6/2023:    1.  Marked supraspinatus tendinopathy with a 1.5 x 1.4 cm of intermediate to high-grade partial-thickness bursal side tearing at the anterior myotendinous junction.  2.  Mild supraspinatus and subscap tendinopathy without tear.  3.  Marked AC joint arthropathy with inferior osteophyte ptosis which effaces the underlying supraspinatus.  Additionally there is moderate subacromial subdeltoid bursal inflammation however the acromiohumeral space is normal.  4.  Findings in keeping with any clinical symptoms of adhesive capsulitis.  5.  Circumferential degeneration and fraying of the labrum, which is doubtful clinical significance.  6.  No tendinopathy, displacement, or tear of the biceps long head tendon.  7.  No full-thickness chondral defect or evidence of glenohumeral joint arthritis.      X-ray right shoulder again reviewed today, right AC joint degeneration with inferior and superior osteophyte formation, type II acromion.  No evidence of glenohumeral joint degeneration or rotator cuff arthropathy as subacromial space is largely maintained.  Previous cervical spine fusion hardware noted.  No other acute bony pathology noted today.    A total of 30 minutes spent with patient on care and care coordinating activities.

## 2023-01-23 NOTE — PATIENT INSTRUCTIONS
Thank you for following up with [facility] orth[facility]ics regarding her right shoulder MRI results.    Based on your exam and findings, you do have some partial thickness tearing of 1 tendon of the rotator cuff however your overall function in the shoulder is quite relatively normal other than weakness and occasional pain.    As we discussed, rehabilitation or strengthening of the rotator cuff quite frequently improves function and reduces pain in the shoulder joint.    We also discussed that you using the shoulder after proper rehabilitation in and of itself is not a risk for permanent disability of the shoulder, progression of any shoulder conditions can happen with or without use, trauma, injury, etc.    We also discussed that given your history of cervical spine neurologic injury resulting in permanent muscle weakness to the shoulder, there could also be limitations in how much improvement you can have regardless of the condition of your rotator cuff.    I would also walk on an opinion from your spine/neurology surgeon on how much the cervical spine condition is affecting the shoulder weakness.    I recommend you restart your physical therapy treatment with the therapist and performing your exercises at home for up to 3 months.  If you do not see significant improvement we can see you back in clinic, preferably with a shoulder surgeon.

## 2023-02-09 DIAGNOSIS — F43.23 ADJUSTMENT DISORDER WITH MIXED ANXIETY AND DEPRESSED MOOD: ICD-10-CM

## 2023-02-09 NOTE — TELEPHONE ENCOUNTER
Please call patient and confirm what dose he is taking and help set up virtual visit to follow up. I can give small refill in the meantime

## 2023-02-09 NOTE — TELEPHONE ENCOUNTER
Routing refill request to provider for review/approval because:  Patient needs to be seen because:  virtual visit past due  Confirm ongoing dose  Bindu Rasheed RN, BSN  [facility]

## 2023-02-10 ENCOUNTER — MYC MEDICAL ADVICE (OUTPATIENT)
Dept: FAMILY MEDICINE | Facility: CLINIC | Age: 68
End: 2023-02-10
Payer: MEDICARE

## 2023-02-10 NOTE — TELEPHONE ENCOUNTER
Provider already routed to  Triage pool.     KRYS Quan  Patient Advocate Liason (PAL)  [facility]  Ph. [phone] / Fax. [phone]

## 2023-02-10 NOTE — TELEPHONE ENCOUNTER
Called patient and scheduled them for a VV with PCP on 2/14. Patient reports that they tried taking both 1 20 mg pill per day and then upped the dose to two pills a day. They went back to one pill but they report that they didn't feel there was any improvement with either dosage.    Wendy Stockton

## 2023-02-10 NOTE — TELEPHONE ENCOUNTER
Please call him per my note below. I need to know what dose he is taking so I can refill. I dont wont him going without the medication until he can be seen. Thanks!

## 2023-02-10 NOTE — TELEPHONE ENCOUNTER
Routing refill request to provider for review/approval because:  Failed protocol for fluoxetine - phq9 > 4 and pt is due for a virtual med check.    Will forward to the station, please try to help the pt schedule per above.

## 2023-02-13 ASSESSMENT — PATIENT HEALTH QUESTIONNAIRE - PHQ9
SUM OF ALL RESPONSES TO PHQ QUESTIONS 1-9: 14
SUM OF ALL RESPONSES TO PHQ QUESTIONS 1-9: 14
10. IF YOU CHECKED OFF ANY PROBLEMS, HOW DIFFICULT HAVE THESE PROBLEMS MADE IT FOR YOU TO DO YOUR WORK, TAKE CARE OF THINGS AT HOME, OR GET ALONG WITH OTHER PEOPLE: SOMEWHAT DIFFICULT

## 2023-02-14 ENCOUNTER — VIRTUAL VISIT (OUTPATIENT)
Dept: FAMILY MEDICINE | Facility: CLINIC | Age: 68
End: 2023-02-14
Payer: MEDICARE

## 2023-02-14 DIAGNOSIS — F43.23 ADJUSTMENT DISORDER WITH MIXED ANXIETY AND DEPRESSED MOOD: Primary | ICD-10-CM

## 2023-02-14 DIAGNOSIS — R53.83 OTHER FATIGUE: ICD-10-CM

## 2023-02-14 DIAGNOSIS — C41.3 CHONDROSARCOMA OF RIBS (H): ICD-10-CM

## 2023-02-14 DIAGNOSIS — R79.89 OTHER SPECIFIED ABNORMAL FINDINGS OF BLOOD CHEMISTRY: ICD-10-CM

## 2023-02-14 DIAGNOSIS — I77.810 MILD ASCENDING AORTA DILATION (H): ICD-10-CM

## 2023-02-14 PROCEDURE — 99214 OFFICE O/P EST MOD 30 MIN: CPT | Mod: VID | Performed by: PHYSICIAN ASSISTANT

## 2023-02-14 RX ORDER — VENLAFAXINE HYDROCHLORIDE 75 MG/1
75 CAPSULE, EXTENDED RELEASE ORAL DAILY
Qty: 90 CAPSULE | Refills: 0 | Status: SHIPPED | OUTPATIENT
Start: 2023-02-14 | End: 2023-06-26

## 2023-02-14 ASSESSMENT — PATIENT HEALTH QUESTIONNAIRE - PHQ9
10. IF YOU CHECKED OFF ANY PROBLEMS, HOW DIFFICULT HAVE THESE PROBLEMS MADE IT FOR YOU TO DO YOUR WORK, TAKE CARE OF THINGS AT HOME, OR GET ALONG WITH OTHER PEOPLE: SOMEWHAT DIFFICULT
SUM OF ALL RESPONSES TO PHQ QUESTIONS 1-9: 14

## 2023-02-14 NOTE — PROGRESS NOTES
"ALEJO is a 67 year old who is being evaluated via a billable video visit.      How would you like to obtain your AVS? MyChart  If the video visit is dropped, the invitation should be resent by: Text to cell phone: [phone]  Will anyone else be joining your video visit? No          Assessment & Plan     Adjustment disorder with mixed anxiety and depressed mood  Alejo continues his fairly sudden, significant decline of mood, energy. At time he is even avoiding simple ADLs for a few days. He denies any self harm thinking. He gained zero improvement with fluoxetine. With low energy switching to effexor hoping this will be slightly more activating. Consider adding mirtazapine. Follow-up 6 weeks virtual. He is still not interested in therapy.   - venlafaxine (EFFEXOR XR) 75 MG 24 hr capsule; Take 1 capsule (75 mg) by mouth daily    Other fatigue  Other specified abnormal findings of blood chemistry  Broadening differential with his ongoing symptoms.   - TSH with free T4 reflex; Future  - CBC with Platelets & Differential; Future  - Ferritin; Future  - Iron & Iron Binding Capacity; Future  - Testosterone Bioavailable; Future      Mild ascending aorta dilation (H)  Chondrosarcoma of ribs (H)  Continues follow up with Chest CT. STable per last imaging in 01/23.                BMI:   Estimated body mass index is 30.05 kg/m  as calculated from the following:    Height as of 11/10/22: 1.816 m (5' 11.5\").    Weight as of 11/10/22: 99.1 kg (218 lb 8 oz).     Depression Screening Follow Up    PHQ 2/13/2023   PHQ-9 Total Score 14   Q9: Thoughts of better off dead/self-harm past 2 weeks Not at all         Follow Up Actions Taken  Crisis resource information provided in After Visit Summary  medication changes; continue to consider therapy       Return in about 6 weeks (around 3/28/2023).    GUY Aguilar [facility]    Subjective   ALEJO is a 67 year old, presenting for the following health " "issues:  Recheck Medication and Depression      History of Present Illness       Mental Health Follow-up:  Patient presents to follow-up on Depression.Patient's depression since last visit has been:  No change  The patient is having other symptoms associated with depression.      Any significant life events: health concerns  Patient is not feeling anxious or having panic attacks.  Patient has no concerns about alcohol or drug use.    He eats 0-1 servings of fruits and vegetables daily.He consumes 0 sweetened beverage(s) daily.He exercises with enough effort to increase his heart rate 9 or less minutes per day.  He exercises with enough effort to increase his heart rate 3 or less days per week.   He is taking medications regularly.    Today's PHQ-9         PHQ-9 Total Score: 14    PHQ-9 Q9 Thoughts of better off dead/self-harm past 2 weeks :   Not at all    How difficult have these problems made it for you to do your work, take care of things at home, or get along with other people: Somewhat difficult       Medication Followup of FLUoxetine 40 MG    Taking Medication as prescribed: yes    Side Effects:  Feeling more tired     Medication Helping Symptoms:  NO-states that he can't feel a difference.     Lev Cabral is a 67 year old male who presents today for medication check  We started him on fluoxetine in fall, and he noted no side effects  Unfortunately he also notes no benefit in symptoms  Spends 16 hours a day on the cough  Little interest in doing things  Skipping showers for 3 days in a row  Will skip tooth brushing  Sleep is up and down  Denies any SI, but has had thoughts about \"what's the point\"  No energy/feels \"so tired\"  Found out he will need massive back surgery, multiple fusions   -this has caused a significant worsening of moods as well   -especially when told he likely cannot hunt following the surgeries    Review of Systems   Constitutional, HEENT, cardiovascular, pulmonary, gi and gu systems " are negative, except as otherwise noted.      Objective           Vitals:  No vitals were obtained today due to virtual visit.    Physical Exam   GENERAL: Healthy, alert and no distress  EYES: Eyes grossly normal to inspection.  No discharge or erythema, or obvious scleral/conjunctival abnormalities.  RESP: No audible wheeze, cough, or visible cyanosis.  No visible retractions or increased work of breathing.    SKIN: Visible skin clear. No significant rash, abnormal pigmentation or lesions.  NEURO: Cranial nerves grossly intact.  Mentation and speech appropriate for age.  PSYCH: Mentation appears normal, affect normal/bright, judgement and insight intact, normal speech and appearance well-groomed.                Video-Visit Details    Type of service:  Video Visit   Video Start Time: 5:24 PM  Video End Time:5:51 PM    Originating Location (pt. Location): Home    Distant Location (provider location):  On-site  Platform used for Video Visit: Sg

## 2023-02-27 ENCOUNTER — LAB (OUTPATIENT)
Dept: LAB | Facility: CLINIC | Age: 68
End: 2023-02-27
Payer: MEDICARE

## 2023-02-27 DIAGNOSIS — R53.83 OTHER FATIGUE: ICD-10-CM

## 2023-02-27 DIAGNOSIS — R79.89 OTHER SPECIFIED ABNORMAL FINDINGS OF BLOOD CHEMISTRY: ICD-10-CM

## 2023-02-27 LAB
BASOPHILS # BLD AUTO: 0 10E3/UL (ref 0–0.2)
BASOPHILS NFR BLD AUTO: 0 %
EOSINOPHIL # BLD AUTO: 0.2 10E3/UL (ref 0–0.7)
EOSINOPHIL NFR BLD AUTO: 3 %
ERYTHROCYTE [DISTWIDTH] IN BLOOD BY AUTOMATED COUNT: 13.1 % (ref 10–15)
FERRITIN SERPL-MCNC: 162 NG/ML (ref 31–409)
HCT VFR BLD AUTO: 50.3 % (ref 40–53)
HGB BLD-MCNC: 16.8 G/DL (ref 13.3–17.7)
IRON BINDING CAPACITY (ROCHE): 287 UG/DL (ref 240–430)
IRON SATN MFR SERPL: 43 % (ref 15–46)
IRON SERPL-MCNC: 122 UG/DL (ref 61–157)
LYMPHOCYTES # BLD AUTO: 1.5 10E3/UL (ref 0.8–5.3)
LYMPHOCYTES NFR BLD AUTO: 25 %
MCH RBC QN AUTO: 29.6 PG (ref 26.5–33)
MCHC RBC AUTO-ENTMCNC: 33.4 G/DL (ref 31.5–36.5)
MCV RBC AUTO: 89 FL (ref 78–100)
MONOCYTES # BLD AUTO: 0.6 10E3/UL (ref 0–1.3)
MONOCYTES NFR BLD AUTO: 9 %
NEUTROPHILS # BLD AUTO: 3.8 10E3/UL (ref 1.6–8.3)
NEUTROPHILS NFR BLD AUTO: 63 %
PLATELET # BLD AUTO: 203 10E3/UL (ref 150–450)
RBC # BLD AUTO: 5.67 10E6/UL (ref 4.4–5.9)
TSH SERPL DL<=0.005 MIU/L-ACNC: 4.15 UIU/ML (ref 0.3–4.2)
WBC # BLD AUTO: 6 10E3/UL (ref 4–11)

## 2023-02-27 PROCEDURE — 83540 ASSAY OF IRON: CPT

## 2023-02-27 PROCEDURE — 82728 ASSAY OF FERRITIN: CPT

## 2023-02-27 PROCEDURE — 84270 ASSAY OF SEX HORMONE GLOBUL: CPT

## 2023-02-27 PROCEDURE — 83550 IRON BINDING TEST: CPT

## 2023-02-27 PROCEDURE — 36415 COLL VENOUS BLD VENIPUNCTURE: CPT

## 2023-02-27 PROCEDURE — 84403 ASSAY OF TOTAL TESTOSTERONE: CPT

## 2023-02-27 PROCEDURE — 84443 ASSAY THYROID STIM HORMONE: CPT

## 2023-02-27 PROCEDURE — 85025 COMPLETE CBC W/AUTO DIFF WBC: CPT

## 2023-02-27 NOTE — TELEPHONE ENCOUNTER
Alejo called today requesting information regarding exercises for his right shoulder.  He had an MRI since attending PT last September.  I reviewed Trae Brady's note and recommend that Alejo attend at least one more PT session to review and progress his home exercise program.  We did review his current HEP fpr PT from last fall.  He can continue with these exercises.  Manjeet Villafuerte

## 2023-02-28 LAB — SHBG SERPL-SCNC: 28 NMOL/L (ref 11–80)

## 2023-03-01 LAB
TESTOST FREE SERPL-MCNC: 8.18 NG/DL
TESTOST SERPL-MCNC: 370 NG/DL (ref 240–950)

## 2023-03-09 ENCOUNTER — THERAPY VISIT (OUTPATIENT)
Dept: PHYSICAL THERAPY | Facility: CLINIC | Age: 68
End: 2023-03-09
Payer: MEDICARE

## 2023-03-09 DIAGNOSIS — M25.511 CHRONIC RIGHT SHOULDER PAIN: ICD-10-CM

## 2023-03-09 DIAGNOSIS — G89.29 CHRONIC RIGHT SHOULDER PAIN: ICD-10-CM

## 2023-03-09 PROCEDURE — 97110 THERAPEUTIC EXERCISES: CPT | Mod: GP | Performed by: PHYSICAL THERAPIST

## 2023-03-09 PROCEDURE — 97161 PT EVAL LOW COMPLEX 20 MIN: CPT | Mod: GP | Performed by: PHYSICAL THERAPIST

## 2023-03-09 PROCEDURE — 97112 NEUROMUSCULAR REEDUCATION: CPT | Mod: GP | Performed by: PHYSICAL THERAPIST

## 2023-03-09 NOTE — PROGRESS NOTES
[facility]    OUTPATIENT Physical Therapy ORTHOPEDIC EVALUATION  PLAN OF TREATMENT FOR OUTPATIENT REHABILITATION  (COMPLETE FOR INITIAL CLAIMS ONLY)  Patient's Last Name, First Name, M.I.  YOB: 1955  Lev Cabral    Provider s Name:  [facility]   Medical Record No.  [id number]   Start of Care Date:  03/09/23   Onset Date:   05/14/22   Treatment Diagnosis:  Rt shoulder pain, RTC tear Medical Diagnosis:  Chronic right shoulder pain       Goals:     03/09/23 0500   Body Part   Goals listed below are for Rt shoulder pain, RTC tear   Goal #1   Goal #1 reaching   Previous Functional Level Could reach;to counter height;to shoulder level;to floor   (initial injury was 5/14/22)   Current Functional Level Cannot reach ;behind back;out to the side;across body   Performance level cannot pull a bow back   STG Target Performance Reach ;to shoulder level;overhead;out to the side;across body;to the floor    Rationale for dressing;for safe driving, hook seat belt, reach shift lever and turn signal, using both hands on steering wheel   Due date 03/30/23   LTG Target Performance Reach;behind back;overhead;out to the side;across body;to the floor ;Unrestricted reaching   Performance Level able to pull a bow at 40#   Rationale for dressing;for job requirements in their work place;for safe driving, hook seat belt, reach shift lever and turn signal, using both hands on steering wheel   Due date 04/20/23         Therapy Frequency:  1x, every 3 weeks  Predicted Duration of Therapy Intervention:  6 weeks    Paul Villafuerte, PT                 I CERTIFY THE NEED FOR THESE SERVICES FURNISHED UNDER        THIS PLAN OF TREATMENT AND WHILE UNDER MY CARE     (Physician attestation of this document indicates review and certification of the therapy plan).                     Certification Date  From:  03/09/23   Certification Date To:  04/20/23    Referring Provider:  China Camp    Initial Assessment        See Epic Evaluation SOC Date: 03/09/23                                                                                                                                [facility]    OUTPATIENT    03/09/23 0500   Body Part   Goals listed below are for Rt shoulder pain, RTC tear   Goal #1   Goal #1 reaching   Previous Functional Level Could reach;to counter height;to shoulder level;to floor   (initial injury was 5/14/22)   Current Functional Level Cannot reach ;behind back;out to the side;across body   Performance level cannot pull a bow back   STG Target Performance Reach ;to shoulder level;overhead;out to the side;across body;to the floor    Rationale for dressing;for safe driving, hook seat belt, reach shift lever and turn signal, using both hands on steering wheel   Due date 03/30/23   LTG Target Performance Reach;behind back;overhead;out to the side;across body;to the floor ;Unrestricted reaching   Performance Level able to pull a bow at 40#   Rationale for dressing;for job requirements in their work place;for safe driving, hook seat belt, reach shift lever and turn signal, using both hands on steering wheel   Due date 04/20/23      ORTHOPEDIC EVALUATION  PLAN OF TREATMENT FOR OUTPATIENT REHABILITATION  (COMPLETE FOR INITIAL CLAIMS ONLY)  Patient's Last Name, First Name, M.I.  YOB: 1955  Lev Cabral    Provider s [facility]:  [facility]   Medical Record No.  [id number]   Start of Care Date:  03/09/23   Onset Date:   05/14/22   Treatment Diagnosis:  Rt shoulder pain, RTC tear Medical Diagnosis:  Chronic right shoulder pain       Goals:     03/09/23 0500   Body Part   Goals listed below are for Rt shoulder pain, RTC tear   Goal #1   Goal #1 reaching   Previous Functional Level Could reach;to counter height;to shoulder  level;to floor   (initial injury was 5/14/22)   Current Functional Level Cannot reach ;behind back;out to the side;across body   Performance level cannot pull a bow back   STG Target Performance Reach ;to shoulder level;overhead;out to the side;across body;to the floor    Rationale for dressing;for safe driving, hook seat belt, reach shift lever and turn signal, using both hands on steering wheel   Due date 03/30/23   LTG Target Performance Reach;behind back;overhead;out to the side;across body;to the floor ;Unrestricted reaching   Performance Level able to pull a bow at 40#   Rationale for dressing;for job requirements in their work place;for safe driving, hook seat belt, reach shift lever and turn signal, using both hands on steering wheel   Due date 04/20/23         Therapy Frequency:  1x, every 3 weeks  Predicted Duration of Therapy Intervention:  6 weeks    Paul Villafuerte, PT                 I CERTIFY THE NEED FOR THESE SERVICES FURNISHED UNDER        THIS PLAN OF TREATMENT AND WHILE UNDER MY CARE     (Physician attestation of this document indicates review and certification of the therapy plan).                     Certification Date From:  03/09/23   Certification Date To:  04/20/23    Referring Provider:  China Camp    Initial Assessment        See Epic Evaluation SOC Date: 03/09/23

## 2023-03-09 NOTE — PROGRESS NOTES
Physical Therapy Initial Evaluation  Subjective:  The history is provided by the patient.   Patient Health History  Lev Cabral being seen for Tore rotar cuff rt shoulder.     Problem began: 5/14/2022.   Problem occurred: Lifting weights   Pain is reported as 2/10 on pain scale.  General health as reported by patient is fair.  Pertinent medical history includes: cancer and depression.   Red flags:  None as reported by patient.  Medical allergies: none.   Surgeries include:  Cancer surgery.    Current medications:  Anti-depressants.       Primary job tasks include:  Other.   Other job/home tasks details: Retired.                Therapist Generated HPI Evaluation  Problem details: Main concern is right shoulder pain with certain movements.  He feels his shoulder has lost a lot of strength.  He is unable to pull a bow, even at less than 40.#.  Had a MRI early in 2023 showing a RTC tear.  He saw Trae Brady regarding this and he recommended doing PT, strengthening exercises.  He will be having a lumbar spine fusion surgery soon and then will have a cervical spine fusion.  He originally hurt his right shoulder in May, 2022 lifting weights.  .         Type of problem:  Right shoulder.    This is a chronic condition.  Condition occurred with:  Lifting.  Where condition occurred: at home.  Patient reports pain:  Lateral.  Pain is described as aching and is intermittent.  Pain radiates to:  Cervical. Pain is worse during the day.  Since onset symptoms are gradually improving.  Associated symptoms:  Loss of strength and painful arc. Symptoms are exacerbated by certain positions, lifting, using arm behind back and using arm at shoulder level (out to the side)  and relieved by rest.  Special tests included:  MRI.  Previous treatment includes physical therapy. There was moderate improvement following previous treatment.  Barriers include:  None as reported by patient.                        Objective:  System               Cervical/Thoracic Evaluation  Cervical AROM: not assessed                                  Shoulder Evaluation:  ROM:  AROM:    Flexion:  Left:  160    Right:  158    Abduction:  Left: @150   Right:  @150, slight elevation rt with elevation                  Extension/Internal Rotation:  Left:  T12    Right:  PSIS    PROM:              External Rotation:  Right:  75 pain-free                    Strength:  : er, flexion at 90 4/5.  pt able to do 0-90 arom with cues to reduce elevation, hiking shoulder.                      Stability Testing:  not assessed      Special Tests:  not assessed (deferred per MRI and having worked with this patient in September, 2022.  )      Palpation:  not assessed      Mobility Tests:  not assessed                                                 General     ROS    Assessment/Plan:    Patient is a 67 year old male with right side shoulder complaints.    Patient has the following significant findings with corresponding treatment plan.                Diagnosis 1:  Right shoulder pain, weakness, + RTC tear on MRI. Patients arom, strength, and pain level has improved since 9/22.   Pain -  self management, education and home program  Decreased ROM/flexibility - therapeutic exercise, therapeutic activity and home program  Decreased strength - therapeutic exercise, therapeutic activities and home program  Decreased proprioception - neuro re-education, therapeutic activities and home program  Decreased function - therapeutic activities and home program    Therapy Evaluation Codes:   1) Clinical presentation characteristics are:   Stable/Uncomplicated.  2) Decision-Making    Low complexity using standardized patient assessment instrument and/or measureable assessment of functional outcome.  Cumulative Therapy Evaluation is: Low complexity.    Previous and current functional limitations:  (See Goal Flow Sheet for this information)    Short term and Long term goals: (See Goal Flow Sheet for this  information)     Communication ability:  Patient appears to be able to clearly communicate and understand verbal and written communication and follow directions correctly.  Treatment Explanation - The following has been discussed with the patient:   RX ordered/plan of care  Anticipated outcomes  Possible risks and side effects  This patient would benefit from PT intervention to resume normal activities.   Rehab potential is good.    Frequency:  1 X every 3 weeks for 6 weeks  Duration:  for 6 weeks  Discharge Plan:  Achieve all LTG.  Independent in home treatment program.  Reach maximal therapeutic benefit.    Please refer to the daily flowsheet for treatment today, total treatment time and time spent performing 1:1 timed codes.

## 2023-03-09 NOTE — PROGRESS NOTES
[facility]    OUTPATIENT Physical Therapy ORTHOPEDIC EVALUATION  PLAN OF TREATMENT FOR OUTPATIENT REHABILITATION  (COMPLETE FOR INITIAL CLAIMS ONLY)  Patient's Last Name, First Name, M.I.  YOB: 1955  Lev Cabral    Provider s Name:  [facility]   Medical Record No.  [id number]   Start of Care Date:  03/09/23   Onset Date:   05/14/22   Treatment Diagnosis:  Rt shoulder pain, RTC tear Medical Diagnosis:  Chronic right shoulder pain       Goals:     03/09/23 0500   Body Part   Goals listed below are for Rt shoulder pain, RTC tear   Goal #1   Goal #1 reaching   Previous Functional Level Could reach;to counter height;to shoulder level;to floor   (initial injury was 5/14/22)   Current Functional Level Cannot reach ;behind back;out to the side;across body   Performance level cannot pull a bow back   STG Target Performance Reach ;to shoulder level;overhead;out to the side;across body;to the floor    Rationale for dressing;for safe driving, hook seat belt, reach shift lever and turn signal, using both hands on steering wheel   Due date 03/30/23   LTG Target Performance Reach;behind back;overhead;out to the side;across body;to the floor ;Unrestricted reaching   Performance Level able to pull a bow at 40#   Rationale for dressing;for job requirements in their work place;for safe driving, hook seat belt, reach shift lever and turn signal, using both hands on steering wheel   Due date 04/20/23         Therapy Frequency:  1x, every 3 weeks  Predicted Duration of Therapy Intervention:  6 weeks    Paul Villafuerte, PT                 I CERTIFY THE NEED FOR THESE SERVICES FURNISHED UNDER        THIS PLAN OF TREATMENT AND WHILE UNDER MY CARE     (Physician attestation of this document indicates review and certification of the therapy plan).                     Certification Date  From:  03/09/23   Certification Date To:  04/20/23    Referring Provider:  [place]    Initial Assessment        See Epic Evaluation SOC Date: 03/09/23

## 2023-03-20 ENCOUNTER — TELEPHONE (OUTPATIENT)
Dept: GASTROENTEROLOGY | Facility: CLINIC | Age: 68
End: 2023-03-20
Payer: MEDICARE

## 2023-03-20 NOTE — TELEPHONE ENCOUNTER
Screening Questions  BLUE  KIND OF PREP RED  LOCATION [review exclusion criteria] GREEN  SEDATION TYPE        Y Are you active on mychart?       JESSICA MEDINA Ordering/Referring Provider?        MEDICARE, [employer] What type of coverage do you have?      N Have you had a positive covid test in the last 14 days?     30.7 1. BMI  [BMI 40+ - review exclusion criteria]    Y  2. Are you able to give consent for your medical care? [IF NO,RN REVIEW]          N  3. Are you taking any prescription pain medications on a routine schedule   (ex narcotics: oxycodone, roxicodone, oxycontin,  and percocet)? [RN Review]          3a. EXTENDED PREP What kind of prescription?     N 4. Do you have any chemical dependencies such as alcohol, street drugs, or methadone?        **If yes 3- 5 , please schedule with MAC sedation.**          IF YES TO ANY 6 - 10 - HOSPITAL SETTING ONLY.     N 6.   Do you need assistance transferring?     N 7.   Have you had a heart or lung transplant?    N 8.   Are you currently on dialysis?   N 9.   Do you use daily home oxygen?   N 10. Do you take nitroglycerin?   10a.  If yes, how often?     11. [FEMALES]   Are you currently pregnant?    11a.  If yes, how many weeks? [ Greater than 12 weeks, OR NEEDED]    N 12. Do you have Pulmonary Hypertension? *NEED PAC APPT AT [facility] w/ [facility]*     N 13. [review exclusion criteria]  Do you have any implantable devices in your body (pacemaker, defib, LVAD)?    N 14. In the past 6 months, have you had any heart related issues including cardiomyopathy or heart attack?     14a.  If yes, did it require cardiac stenting if so when?     N 15. Have you had a stroke or Transient ischemic attack (TIA - aka  mini stroke ) within 6 months?      Y 16. Do you have mod to severe Obstructive Sleep Apnea?  [Hospital only]    N 17. Do you have SEVERE AND UNCONTROLLED asthma? *NEED PAC APPT AT [facility] w/MAC*     18. Are you currently taking any blood thinners?     18a. No. Continue to  "19.   18b. Yes/no Blood Thinner: No. Inform patient to \"follow up w/ ordering provider for bridging instructions.\"    N 19. Do you take the medication Phentermine?    19a. If yes, \"Hold for 7 days before procedure.  Please consult your prescribing provider if you have questions about holding this medication.\"     N  20. Do you have chronic kidney disease?      N  21. Do you have a diagnosis of diabetes?     N  22. On a regular basis do you go 3-5 days between bowel movements?      23. Preferred LOCAL Pharmacy for Pre Prescription    [ LIST ONLY ONE PHARMACY]      [facility][id number] - [place], [place] - [address]    - CLOSING REMINDERS -    Informed patient they will need an adult    Cannot take any type of public or medical transportation alone    Conscious Sedation- Needs  for 6 hours after the procedure       MAC/General-Needs  for 24 hours after procedure    Pre-Procedure Covid test to be completed [[facility] PCR Testing Required]    Confirmed Nurse will call to complete assessment       Patient wanted to check with wife for dates/times. Aiming for latest appt on a Thurs/Fri. We were taking a look at 6/8 and 6/23.         "

## 2023-03-27 DIAGNOSIS — N52.9 ERECTILE DYSFUNCTION, UNSPECIFIED ERECTILE DYSFUNCTION TYPE: ICD-10-CM

## 2023-03-28 RX ORDER — SILDENAFIL CITRATE 20 MG/1
TABLET ORAL
Qty: 30 TABLET | Refills: 2 | Status: SHIPPED | OUTPATIENT
Start: 2023-03-28 | End: 2023-08-29

## 2023-03-28 NOTE — TELEPHONE ENCOUNTER
Prescription approved per [facility] Refill Protocol.  Bindu Rasheed RN, BSN  [facility]

## 2023-04-20 ENCOUNTER — VIRTUAL VISIT (OUTPATIENT)
Dept: FAMILY MEDICINE | Facility: CLINIC | Age: 68
End: 2023-04-20
Payer: MEDICARE

## 2023-04-20 DIAGNOSIS — Z91.199 NO-SHOW FOR APPOINTMENT: Primary | ICD-10-CM

## 2023-04-20 PROCEDURE — 99207 PR NO CHARGE LOS: CPT | Mod: VID | Performed by: PHYSICIAN ASSISTANT

## 2023-04-20 NOTE — PROGRESS NOTES
This patient was a no show for this scheduled appointment.      This patient was a no show for this scheduled appointment.

## 2023-04-27 ENCOUNTER — VIRTUAL VISIT (OUTPATIENT)
Dept: FAMILY MEDICINE | Facility: CLINIC | Age: 68
End: 2023-04-27
Payer: MEDICARE

## 2023-04-27 DIAGNOSIS — J01.90 SUBACUTE RHINOSINUSITIS: Primary | ICD-10-CM

## 2023-04-27 PROCEDURE — 99213 OFFICE O/P EST LOW 20 MIN: CPT | Mod: 95 | Performed by: PHYSICIAN ASSISTANT

## 2023-04-27 RX ORDER — MULTIVIT WITH MINERALS/LUTEIN
1000 TABLET ORAL DAILY
COMMUNITY

## 2023-04-27 RX ORDER — DOXYCYCLINE 100 MG/1
100 CAPSULE ORAL 2 TIMES DAILY
Qty: 14 CAPSULE | Refills: 0 | Status: SHIPPED | OUTPATIENT
Start: 2023-04-27 | End: 2023-06-26

## 2023-04-27 NOTE — PROGRESS NOTES
"Alejo is a 67 year old who is being evaluated via a billable telephone visit.      What phone number would you like to be contacted at? [phone]  How would you like to obtain your AVS? Michael    Distant Location (provider location):  On-site    Assessment & Plan     Subacute rhinosinusitis  Ongoing, intermittent sinus congestion for m ost of the winter, along with chest congestion. Virtual visit limits exam potential but even with the sinuses this is reasonable to treat. Start below. R/b/se reviewed  - doxycycline hyclate (VIBRAMYCIN) 100 MG capsule; Take 1 capsule (100 mg) by mouth 2 times daily       BMI:   Estimated body mass index is 30.05 kg/m  as calculated from the following:    Height as of 11/10/22: 1.816 m (5' 11.5\").    Weight as of 11/10/22: 99.1 kg (218 lb 8 oz).           Cr Corley PA-C  [facility] [place]    Subjective   Alejo is a 67 year old, presenting for the following health issues:  Sinus Problem (Off and on all winter.  States symptoms include sinus congestion and drainage that is light green.  Intermittent sore throats. )        4/27/2023     5:13 PM   Additional Questions   Roomed by Corine RUSH LPN     HPI     Acute Illness  Acute illness concerns: sinus congestion and green drainage,   Onset/Duration: intermittent all winter long   Symptoms:  Fever: No  Chills/Sweats: No  Headache (location?): YES- forehead pain when his sinus's are plugged.   Sinus Pressure: YES- forehead   Conjunctivitis:  YES- watering   Ear Pain: no  Rhinorrhea: No  Congestion: YES  Sore Throat: YES- from post nasal drip and drainage and thick mucous   Cough: YES-is improving slowly but when he gets up mucous is very thick and green   Wheeze: YES  Decreased Appetite: No  Nausea: No  Vomiting: No  Diarrhea: No  Dysuria/Freq.: No  Dysuria or Hematuria: No  Fatigue/Achiness: YES- does have some fatigue but the coughing is keeping you up nights.    Sick/Strep Exposure: No  Therapies tried and " outcome: cough syrup, Tylenol, Zyrtec  Nothing has helped much.     Lev Cabral is a 67 year old male who presents today for ongoing upper respiratory symptoms that have come and gone over the winter  Never truly overcomes  Coughing up tons of mucus   Lots of nasal congestion  Pain in the chest when coughing  Mucus is lime green  Frontal sinus pressure  Throat ok today, but soreness seem to come go  No difficulty breathing        Review of Systems   Constitutional, HEENT, cardiovascular, pulmonary, gi and gu systems are negative, except as otherwise noted.      Objective           Vitals:  No vitals were obtained today due to virtual visit.    Physical Exam   healthy, alert and no distress  PSYCH: Alert and oriented times 3; coherent speech, normal   rate and volume, able to articulate logical thoughts, able   to abstract reason, no tangential thoughts, no hallucinations   or delusions  His affect is normal  RESP: No cough, no audible wheezing, able to talk in full sentences  Remainder of exam unable to be completed due to telephone visits              Phone call duration: 14 minutes

## 2023-05-01 PROBLEM — G89.29 CHRONIC RIGHT SHOULDER PAIN: Status: RESOLVED | Noted: 2023-03-09 | Resolved: 2023-05-01

## 2023-05-01 PROBLEM — M25.511 CHRONIC RIGHT SHOULDER PAIN: Status: RESOLVED | Noted: 2023-03-09 | Resolved: 2023-05-01

## 2023-05-15 ENCOUNTER — OFFICE VISIT (OUTPATIENT)
Dept: URGENT CARE | Facility: URGENT CARE | Age: 68
End: 2023-05-15
Payer: MEDICARE

## 2023-05-15 VITALS
HEART RATE: 60 BPM | OXYGEN SATURATION: 97 % | SYSTOLIC BLOOD PRESSURE: 151 MMHG | DIASTOLIC BLOOD PRESSURE: 90 MMHG | RESPIRATION RATE: 20 BRPM | TEMPERATURE: 97.6 F
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
## 2023-05-15 DIAGNOSIS — S30.860A TICK BITE OF LOWER BACK, INITIAL ENCOUNTER: Primary | ICD-10-CM

## 2023-05-15 PROCEDURE — 99214 OFFICE O/P EST MOD 30 MIN: CPT | Performed by: NURSE PRACTITIONER

## 2023-05-15 RX ORDER — DOXYCYCLINE 100 MG/1
100 CAPSULE ORAL 2 TIMES DAILY
Qty: 20 CAPSULE | Refills: 0 | Status: SHIPPED | OUTPATIENT
Start: 2023-05-15 | End: 2023-05-25

## 2023-05-15 NOTE — PATIENT INSTRUCTIONS
Shared decision making to attempt tick removal  Patient prefers empiric treatment given it was tiny and present for over 4 days with redness  A tick must be attached for at least 24-48 hours to transfer pathogens and cause an infection. Infection is very unlikely in the first 48-72 hours.  Infection is more likely if the tick is engorged or has been attached for over 72 hours.  Prophylactic antibiotics are recommended only if:  The tick was attached for 36 hours or more AND less than 72 hours has passed since the tick was removed  Treatment with a single dose of Doxycycline, 200mg (4mg/kg in children 8 years of age or older) by mouth with food.  -Do not give to children under 8 or pregnant or lactating women  If there is a contraindication to doxycycline, an alternate antibiotic is not recommended.  Ticks should be removed with tweezers or protected fingers pulling straight out gently and firmly using steady pressure.   Apply ice packs, may take benadryl as needed itch/irritation.  Ibuprofen for discomfort.   Watch for signs of secondary infection- redness, swelling, pain, colored discharge or fever.  Advised to watch for erythema migrans rash (circular red ringed rash), fever, chills, sweats, body aches, stiff neck, headache, joint pain/ discomfort/ sweling or other flu/illness symptoms and be seen by primary care provider at that time.   Erythema migrans rash appears several days after tick bite and is separate from a local reaction to a tick bite.  Please follow up with primary care provider if not improving, worsening or new symptoms.  It will take 6-8 weeks before antibodies are detected with the lyme screen titer.

## 2023-05-15 NOTE — PROGRESS NOTES
Chief Complaint   Patient presents with     Urgent Care     Tick Bite     Tick on back-Small piece still in skin-Noticed 3 days ago      SUBJECTIVE:  Lev Cabral is a 67 year old male who presents to the clinic today with a tick embedded in his right flank area over the last 4 days.  He was in [facility] hunting and his buddies tried to remove it.  It has had blotchy redness surrounding.  Otherwise, he feels well without headache joint pains flulike illness.    Past Medical History:   Diagnosis Date     Unspecified adjustment reaction      psyllium (METAMUCIL) 58.6 % POWD, Take 2 Tablespoonful by mouth daily  sildenafil (REVATIO) 20 MG tablet, TAKE TWO TO FIVE TABLETS BY MOUTH 30 MINUTES TO 4 HOURS BEFORE SEX.  venlafaxine (EFFEXOR XR) 75 MG 24 hr capsule, Take 1 capsule (75 mg) by mouth daily  vitamin B-12 (CYANOCOBALAMIN) 2500 MCG sublingual tablet, Take 1,000 mcg by mouth daily  vitamin C (ASCORBIC ACID) 1000 MG TABS, Take 1,000 mg by mouth daily  VITAMIN D, CHOLECALCIFEROL, PO, Take 1,000 Units by mouth daily  doxycycline hyclate (VIBRAMYCIN) 100 MG capsule, Take 1 capsule (100 mg) by mouth 2 times daily (Patient not taking: Reported on 5/15/2023)    No current facility-administered medications on file prior to visit.    Social History     Tobacco Use     Smoking status: Never     Smokeless tobacco: Never   Vaping Use     Vaping status: Never Used   Substance Use Topics     Alcohol use: No     Alcohol/week: 0.0 standard drinks of alcohol     No Known Allergies    Review of Systems   All systems negative except for those listed above in HPI.    EXAM:   BP (!) 151/90   Pulse 60   Temp 97.6  F (36.4  C) (Tympanic)   Resp 20   SpO2 97%     Physical Exam  Vitals reviewed.   Constitutional:       Appearance: Normal appearance.   HENT:      Head: Normocephalic and atraumatic.      Nose: Nose normal.      Mouth/Throat:      Mouth: Mucous membranes are moist.      Pharynx: Oropharynx is clear.   Eyes:       Extraocular Movements: Extraocular movements intact.      Conjunctiva/sclera: Conjunctivae normal.      Pupils: Pupils are equal, round, and reactive to light.   Cardiovascular:      Rate and Rhythm: Normal rate.      Pulses: Normal pulses.   Pulmonary:      Effort: Pulmonary effort is normal.   Musculoskeletal:         General: Normal range of motion.      Cervical back: Normal range of motion and neck supple.   Skin:     General: Skin is warm and dry.      Findings: Erythema and rash present.      Comments: Black embedded tick part in the right flank with surrounding area of faint erythema.   Neurological:      General: No focal deficit present.      Mental Status: He is alert and oriented to person, place, and time.   Psychiatric:         Mood and Affect: Mood normal.         Behavior: Behavior normal.       ASSESSMENT:    ICD-10-CM    1. Tick bite of lower back, initial encounter  S30.860A doxycycline hyclate (VIBRAMYCIN) 100 MG capsule    W57.XXXA         PLAN:    Procedure: Area was cleansed with alcohol prep pads and removed easily with forceps.    Shared decision making to attempt tick removal  Patient prefers empiric treatment given it was tiny and present for over 4 days with redness  A tick must be attached for at least 24-48 hours to transfer pathogens and cause an infection. Infection is very unlikely in the first 48-72 hours.  Infection is more likely if the tick is engorged or has been attached for over 72 hours.  Prophylactic antibiotics are recommended only if:  The tick was attached for 36 hours or more AND less than 72 hours has passed since the tick was removed  Treatment with a single dose of Doxycycline, 200mg (4mg/kg in children 8 years of age or older) by mouth with food.  -Do not give to children under 8 or pregnant or lactating women  If there is a contraindication to doxycycline, an alternate antibiotic is not recommended.  Ticks should be removed with tweezers or protected fingers  pulling straight out gently and firmly using steady pressure.   Apply ice packs, may take benadryl as needed itch/irritation.  Ibuprofen for discomfort.   Watch for signs of secondary infection- redness, swelling, pain, colored discharge or fever.  Advised to watch for erythema migrans rash (circular red ringed rash), fever, chills, sweats, body aches, stiff neck, headache, joint pain/ discomfort/ sweling or other flu/illness symptoms and be seen by primary care provider at that time.   Erythema migrans rash appears several days after tick bite and is separate from a local reaction to a tick bite.  Please follow up with primary care provider if not improving, worsening or new symptoms.  It will take 6-8 weeks before antibodies are detected with the lyme screen titer.      Follow up with primary care provider with any problems, questions or concerns or if symptoms worsen or fail to improve. Patient agreed to plan and verbalized understanding.    Bronwyn Allen, CHUCKP-BC  [facility]

## 2023-06-26 ENCOUNTER — VIRTUAL VISIT (OUTPATIENT)
Dept: FAMILY MEDICINE | Facility: CLINIC | Age: 68
End: 2023-06-26
Payer: MEDICARE

## 2023-06-26 DIAGNOSIS — R05.2 SUBACUTE COUGH: Primary | ICD-10-CM

## 2023-06-26 DIAGNOSIS — F43.23 ADJUSTMENT DISORDER WITH MIXED ANXIETY AND DEPRESSED MOOD: ICD-10-CM

## 2023-06-26 PROCEDURE — 99214 OFFICE O/P EST MOD 30 MIN: CPT | Mod: 95 | Performed by: PHYSICIAN ASSISTANT

## 2023-06-26 RX ORDER — DOXYCYCLINE 100 MG/1
100 CAPSULE ORAL 2 TIMES DAILY
Qty: 14 CAPSULE | Refills: 0 | Status: CANCELLED | OUTPATIENT
Start: 2023-06-26

## 2023-06-26 RX ORDER — VENLAFAXINE HYDROCHLORIDE 150 MG/1
150 CAPSULE, EXTENDED RELEASE ORAL DAILY
Qty: 90 CAPSULE | Refills: 1 | Status: SHIPPED | OUTPATIENT
Start: 2023-06-26 | End: 2023-12-26

## 2023-06-26 ASSESSMENT — ENCOUNTER SYMPTOMS: COUGH: 1

## 2023-06-26 NOTE — PROGRESS NOTES
"Alejo is a 67 year old who is being evaluated via a billable telephone visit.      What phone number would you like to be contacted at? [phone]  How would you like to obtain your AVS? Michael    Distant Location (provider location):  Off-site    Assessment & Plan       Subacute cough  Treated in [place] for rhinosinusitis, cough with doxy, then had another course in May for tick bite. Low likelihood this is a smoldering bacterial pneumonia. Question silent gerd. Trial PPI. Consider laryngoscopy, chest xray if symptoms persist - I have not evaluated him for this in person  - omeprazole (PRILOSEC) 20 MG DR capsule; Take 1 capsule (20 mg) by mouth daily    Adjustment disorder with mixed anxiety and depressed mood  He does believe this is improving his symptoms, off for three days and symptomatic. Restarting at slightly higher dose.   - venlafaxine (EFFEXOR XR) 150 MG 24 hr capsule; Take 1 capsule (150 mg) by mouth daily     BMI:   Estimated body mass index is 30.05 kg/m  as calculated from the following:    Height as of 11/10/22: 1.816 m (5' 11.5\").    Weight as of 11/10/22: 99.1 kg (218 lb 8 oz).     GUY Aguilar [facility]    Subjective   Alejo is a 67 year old, presenting for the following health issues:  Cough        6/26/2023     2:27 PM   Additional Questions   Roomed by Myra MENDEZ   Accompanied by Self         6/26/2023     2:27 PM   Patient Reported Additional Medications   Patient reports taking the following new medications None     Cough    History of Present Illness       Reason for visit:  Lingering cough  Symptom onset:  More than a month  Symptoms include:  Cough, phlem in throat  Symptom intensity:  Mild  Symptom progression:  Staying the same  Had these symptoms before:  Yes  Has tried/received treatment for these symptoms:  Yes  Previous treatment was successful:  No  What makes it worse:  Going outside  What makes it better:  Nothing really, worse at night and " morning, laying on back    He eats 0-1 servings of fruits and vegetables daily.He consumes 0 sweetened beverage(s) daily.He exercises with enough effort to increase his heart rate 9 or less minutes per day.  He exercises with enough effort to increase his heart rate 3 or less days per week.   He is taking medications regularly.       Concern - Lingering cough   Onset: More than a month  Description: Barky cough, coughing up phlem  Intensity: mild  Progression of Symptoms:  same  Accompanying Signs & Symptoms: n/a  Previous history of similar problem: yes  Precipitating factors:        Worsened by: talking, going outside  Alleviating factors:        Improved by: nothing really  Therapies tried and outcome: Antibiotics    Lev Cabral is a 67 year old male who presents today for ongoing subacute cough  There is no mucus coming from the chest; more phlegm in the throat  Cough worsens in the night when laying flat  Having coughing spurts daily; can be harsh  Less congestion than previous discussion  He denies any symptoms of heartburn    Had been doing better on the venlafaxine but still room for improvement  Still avoiding things he previous used to enjoy though his energy level and avoidance are better        Review of Systems   Respiratory: Positive for cough.        Objective           Vitals:  No vitals were obtained today due to virtual visit.    Physical Exam   healthy, alert and no distress  PSYCH: Alert and oriented times 3; coherent speech, normal   rate and volume, able to articulate logical thoughts, able   to abstract reason, no tangential thoughts, no hallucinations   or delusions  His affect is normal  RESP: No cough, no audible wheezing, able to talk in full sentences - his voice did become more hoarse the more we talked  Remainder of exam unable to be completed due to telephone visits                Phone call duration: 19 minutes

## 2023-08-17 ENCOUNTER — VIRTUAL VISIT (OUTPATIENT)
Dept: FAMILY MEDICINE | Facility: CLINIC | Age: 68
End: 2023-08-17
Payer: MEDICARE

## 2023-08-17 DIAGNOSIS — R05.2 SUBACUTE COUGH: Primary | ICD-10-CM

## 2023-08-17 PROCEDURE — 99213 OFFICE O/P EST LOW 20 MIN: CPT | Mod: 95 | Performed by: PHYSICIAN ASSISTANT

## 2023-08-17 ASSESSMENT — ENCOUNTER SYMPTOMS: COUGH: 1

## 2023-08-17 NOTE — PROGRESS NOTES
"Alejo is a 67 year old who is being evaluated via a billable telephone visit.      What phone number would you like to be contacted at? [phone]  How would you like to obtain your AVS? Michael    Distant Location (provider location):  On-site    Assessment & Plan     Subacute cough  Jasper symptoms only minimally improved it at all with the addition of the PPI. He has now failed abx and ppi (although I have yet to evaluate him in clinic for this). Laying flat increases a throat cough though he denies any nasal drainage symptoms. At this point, given a normal CT in January when some of the symptoms truly began, I first recommend he see ENT. He's seen [facility] previously and would like to start there. He can call if referral is needed       BMI:   Estimated body mass index is 30.05 kg/m  as calculated from the following:    Height as of 11/10/22: 1.816 m (5' 11.5\").    Weight as of 11/10/22: 99.1 kg (218 lb 8 oz).           Cr Corley PA-C  [facility]    Subjective   Alejo is a 67 year old, presenting for the following health issues:  Video Visit and Cough        8/17/2023     4:59 PM   Additional Questions   Roomed by Rochelle   Accompanied by self         8/17/2023     4:59 PM   Patient Reported Additional Medications   Patient reports taking the following new medications none       Cough         Acute Illness  Acute illness concerns: cough - with talking, loses voice. Coughing more in the night time especially with laying, and in the morning. Seems to be like excess phlegm in throat that he cannot get out  Onset/Duration: since April  Symptoms:  Fever: No  Chills/Sweats: No  Headache (location?): No  Sinus Pressure: No  Conjunctivitis:  No  Ear Pain: no  Rhinorrhea: No  Congestion: No  Sore Throat: No  Cough: YES-non-productive, with shortness of breath, improving over time  Wheeze: YES  Decreased Appetite: No  Nausea: No  Vomiting: No  Diarrhea: No  Dysuria/Freq.: No  Dysuria " or Hematuria: No  Fatigue/Achiness: No  Sick/Strep Exposure: No  Therapies tried and outcome: Omeprazole 20 mg      Lev Cabral is a 67 year old male who presents today for persistent cough  At his last appt we did a trial of PPI to clear any silent reflux  He thinks there is a chance it improved somewhat  Still feeling a gravely voice  Coughing worse when laying flat on his back; feels like coming throat but denies any faye sinus symptoms/drainage  Rare cough attacks when standing up  If he lays on his side he can occasionally hear a gurgling   -never coughing up food   No shortness of breath         Review of Systems   Respiratory:  Positive for cough.       Constitutional, HEENT, cardiovascular, pulmonary, gi and gu systems are negative, except as otherwise noted.      Objective           Vitals:  No vitals were obtained today due to virtual visit.    Physical Exam   healthy, alert, and no distress  PSYCH: Alert and oriented times 3; coherent speech, normal   rate and volume, able to articulate logical thoughts, able   to abstract reason, no tangential thoughts, no hallucinations   or delusions  His affect is normal  RESP: No cough, no audible wheezing, able to talk in full sentences  Remainder of exam unable to be completed due to telephone visits                Phone call duration: 15 minutes

## 2023-08-25 DIAGNOSIS — N52.9 ERECTILE DYSFUNCTION, UNSPECIFIED ERECTILE DYSFUNCTION TYPE: ICD-10-CM

## 2023-08-29 RX ORDER — SILDENAFIL CITRATE 20 MG/1
TABLET ORAL
Qty: 30 TABLET | Refills: 2 | Status: SHIPPED | OUTPATIENT
Start: 2023-08-29 | End: 2024-01-02

## 2023-10-11 ENCOUNTER — PATIENT OUTREACH (OUTPATIENT)
Dept: CARE COORDINATION | Facility: CLINIC | Age: 68
End: 2023-10-11
Payer: MEDICARE

## 2023-10-25 ENCOUNTER — PATIENT OUTREACH (OUTPATIENT)
Dept: CARE COORDINATION | Facility: CLINIC | Age: 68
End: 2023-10-25
Payer: MEDICARE

## 2023-11-14 ENCOUNTER — TELEPHONE (OUTPATIENT)
Dept: GASTROENTEROLOGY | Facility: CLINIC | Age: 68
End: 2023-11-14
Payer: MEDICARE

## 2023-11-14 NOTE — TELEPHONE ENCOUNTER
"Endoscopy Scheduling Screen    Have you had a positive Covid test in the last 14 days?  No    Are you active on MyChart?   Yes    What insurance is in the chart?  Other:  medicare/supplement    Ordering/Referring Provider:     JESSICA MEDINA      (If ordering provider performs procedure, schedule with ordering provider unless otherwise instructed. )    BMI: Estimated body mass index is 30.05 kg/m  as calculated from the following:    Height as of 11/10/22: 1.816 m (5' 11.5\").    Weight as of 11/10/22: 99.1 kg (218 lb 8 oz).     Sedation Ordered  moderate sedation.   If patient BMI > 50 do not schedule in [facility].    If patient BMI > 45 do not schedule at [facility].    Are you taking methadone or Suboxone?  No    Are you taking any prescription medications for pain 3 or more times per week?   No    Do you have a history of malignant hyperthermia or adverse reaction to anesthesia?  No    (Females) Are you currently pregnant?   No     Have you been diagnosed or told you have pulmonary hypertension?   No    Do you have an LVAD?  No    Have you been told you have moderate to severe sleep apnea?  Yes (RN Review required for scheduling unless scheduling in Hospital.)    Have you been told you have COPD, asthma, or any other lung disease?  No    Do you have any heart conditions?  Yes enlarged aorta    In the past 6 months, have you had any hospitalizations for heart related issues including cardiomyopathy, heart attack, or stent placement?  No    Do you have any implantable devices in your body (pacemaker, ICD)?  No    Do you take nitroglycerine?  No    Have you ever had an organ transplant?   No    Have you ever had or are you awaiting a heart or lung transplant?   No    Have you had a stroke or transient ischemic attack (TIA aka \"mini stroke\" in the last 6 months?   No    Have you been diagnosed with or been told you have cirrhosis of the liver?   No    Are you currently on dialysis?   No    Do you need assistance " "transferring?   No    BMI: Estimated body mass index is 30.05 kg/m  as calculated from the following:    Height as of 11/10/22: 1.816 m (5' 11.5\").    Weight as of 11/10/22: 99.1 kg (218 lb 8 oz).     Is patients BMI > 40 and scheduling location [facility]?  No    Do you take an injectable medication for weight loss or diabetes (excluding insulin)?  No    Do you take the medication Naltrexone?  No    Do you take blood thinners?  No       Prep   Are you currently on dialysis or do you have chronic kidney disease?  No    Do you have a diagnosis of diabetes?  No    Do you have a diagnosis of cystic fibrosis (CF)?  No    On a regular basis do you go 3 -5 days between bowel movements?  No    BMI > 40?  No    Preferred Pharmacy:      [id number] - [place], [place] - [address] [address]  [address] [facility] [address]  Phone: [phone] Fax: [phone]      Final Scheduling Details   Colonoscopy prep sent?  Standard MiraLAX    Procedure scheduled  Colonoscopy    Surgeon:  Fabienne     Date of procedure:  2/22/24     Pre-OP / PAC:   No - Not required for this site.    Location  [facility] - Per order.    Sedation   Moderate Sedation - Per order.      Patient Reminders:   You will receive a call from a Nurse to review instructions and health history.  This assessment must be completed prior to your procedure.  Failure to complete the Nurse assessment may result in the procedure being cancelled.      On the day of your procedure, please designate an adult(s) who can drive you home stay with you for the next 24 hours. The medicines used in the exam will make you sleepy. You will not be able to drive.      You cannot take public transportation, ride share services, or non-medical taxi service without a responsible caregiver.  Medical transport services are allowed with the requirement that a responsible caregiver will receive you at your destination.  We require that drivers and caregivers are confirmed prior to your " procedure.

## 2023-12-06 ENCOUNTER — TRANSFERRED RECORDS (OUTPATIENT)
Dept: HEALTH INFORMATION MANAGEMENT | Facility: CLINIC | Age: 68
End: 2023-12-06
Payer: MEDICARE

## 2023-12-24 DIAGNOSIS — F43.23 ADJUSTMENT DISORDER WITH MIXED ANXIETY AND DEPRESSED MOOD: ICD-10-CM

## 2023-12-26 RX ORDER — VENLAFAXINE HYDROCHLORIDE 150 MG/1
150 CAPSULE, EXTENDED RELEASE ORAL DAILY
Qty: 90 CAPSULE | Refills: 1 | Status: SHIPPED | OUTPATIENT
Start: 2023-12-26 | End: 2024-06-26

## 2023-12-31 DIAGNOSIS — N52.9 ERECTILE DYSFUNCTION, UNSPECIFIED ERECTILE DYSFUNCTION TYPE: ICD-10-CM

## 2024-01-01 ASSESSMENT — ENCOUNTER SYMPTOMS
WEAKNESS: 1
SORE THROAT: 0
NERVOUS/ANXIOUS: 0
CONSTIPATION: 0
DIARRHEA: 0
EYE PAIN: 0
PARESTHESIAS: 0
COUGH: 1
FREQUENCY: 0
NAUSEA: 0
DYSURIA: 0
HEMATURIA: 0
DIZZINESS: 0
ARTHRALGIAS: 0
PALPITATIONS: 0
FEVER: 0
HEADACHES: 0
CHILLS: 0
SHORTNESS OF BREATH: 0
HEARTBURN: 0
MYALGIAS: 0
ABDOMINAL PAIN: 0
JOINT SWELLING: 0
HEMATOCHEZIA: 0

## 2024-01-01 ASSESSMENT — ACTIVITIES OF DAILY LIVING (ADL): CURRENT_FUNCTION: NO ASSISTANCE NEEDED

## 2024-01-02 RX ORDER — SILDENAFIL CITRATE 20 MG/1
TABLET ORAL
Qty: 30 TABLET | Refills: 2 | Status: SHIPPED | OUTPATIENT
Start: 2024-01-02 | End: 2024-05-28

## 2024-01-08 ENCOUNTER — OFFICE VISIT (OUTPATIENT)
Dept: FAMILY MEDICINE | Facility: CLINIC | Age: 69
End: 2024-01-08
Payer: MEDICARE

## 2024-01-08 VITALS
RESPIRATION RATE: 20 BRPM | SYSTOLIC BLOOD PRESSURE: 182 MMHG | TEMPERATURE: 98 F | DIASTOLIC BLOOD PRESSURE: 97 MMHG | WEIGHT: 233.6 LBS | HEART RATE: 63 BPM | OXYGEN SATURATION: 95 % | HEIGHT: 72 IN | BODY MASS INDEX: 31.64 KG/M2

## 2024-01-08 DIAGNOSIS — I77.810 MILD ASCENDING AORTA DILATION (H): ICD-10-CM

## 2024-01-08 DIAGNOSIS — M48.02 CERVICAL STENOSIS OF SPINAL CANAL: ICD-10-CM

## 2024-01-08 DIAGNOSIS — F43.0 ACUTE REACTION TO STRESS: ICD-10-CM

## 2024-01-08 DIAGNOSIS — I10 ESSENTIAL HYPERTENSION: ICD-10-CM

## 2024-01-08 DIAGNOSIS — C41.3 CHONDROSARCOMA OF RIBS (H): ICD-10-CM

## 2024-01-08 DIAGNOSIS — Z00.00 ENCOUNTER FOR MEDICARE ANNUAL WELLNESS EXAM: Primary | ICD-10-CM

## 2024-01-08 LAB
ALBUMIN SERPL BCG-MCNC: 4.4 G/DL (ref 3.5–5.2)
ALP SERPL-CCNC: 79 U/L (ref 40–150)
ALT SERPL W P-5'-P-CCNC: 33 U/L (ref 0–70)
ANION GAP SERPL CALCULATED.3IONS-SCNC: 8 MMOL/L (ref 7–15)
AST SERPL W P-5'-P-CCNC: 31 U/L (ref 0–45)
BILIRUB SERPL-MCNC: 0.6 MG/DL
BUN SERPL-MCNC: 20.7 MG/DL (ref 8–23)
CALCIUM SERPL-MCNC: 9.4 MG/DL (ref 8.8–10.2)
CHLORIDE SERPL-SCNC: 104 MMOL/L (ref 98–107)
CHOLEST SERPL-MCNC: 189 MG/DL
CREAT SERPL-MCNC: 0.96 MG/DL (ref 0.67–1.17)
DEPRECATED HCO3 PLAS-SCNC: 28 MMOL/L (ref 22–29)
EGFRCR SERPLBLD CKD-EPI 2021: 86 ML/MIN/1.73M2
FASTING STATUS PATIENT QL REPORTED: YES
GLUCOSE SERPL-MCNC: 116 MG/DL (ref 70–99)
HDLC SERPL-MCNC: 48 MG/DL
LDLC SERPL CALC-MCNC: 110 MG/DL
NONHDLC SERPL-MCNC: 141 MG/DL
POTASSIUM SERPL-SCNC: 4.6 MMOL/L (ref 3.4–5.3)
PROT SERPL-MCNC: 7.2 G/DL (ref 6.4–8.3)
SODIUM SERPL-SCNC: 140 MMOL/L (ref 135–145)
TRIGL SERPL-MCNC: 155 MG/DL

## 2024-01-08 PROCEDURE — G0439 PPPS, SUBSEQ VISIT: HCPCS | Performed by: PHYSICIAN ASSISTANT

## 2024-01-08 PROCEDURE — G0008 ADMIN INFLUENZA VIRUS VAC: HCPCS | Performed by: PHYSICIAN ASSISTANT

## 2024-01-08 PROCEDURE — 90662 IIV NO PRSV INCREASED AG IM: CPT | Performed by: PHYSICIAN ASSISTANT

## 2024-01-08 PROCEDURE — 36415 COLL VENOUS BLD VENIPUNCTURE: CPT | Performed by: PHYSICIAN ASSISTANT

## 2024-01-08 PROCEDURE — 80061 LIPID PANEL: CPT | Performed by: PHYSICIAN ASSISTANT

## 2024-01-08 PROCEDURE — 91320 SARSCV2 VAC 30MCG TRS-SUC IM: CPT | Performed by: PHYSICIAN ASSISTANT

## 2024-01-08 PROCEDURE — 90480 ADMN SARSCOV2 VAC 1/ONLY CMP: CPT | Performed by: PHYSICIAN ASSISTANT

## 2024-01-08 PROCEDURE — 99214 OFFICE O/P EST MOD 30 MIN: CPT | Mod: 25 | Performed by: PHYSICIAN ASSISTANT

## 2024-01-08 PROCEDURE — 80053 COMPREHEN METABOLIC PANEL: CPT | Performed by: PHYSICIAN ASSISTANT

## 2024-01-08 RX ORDER — RESPIRATORY SYNCYTIAL VIRUS VACCINE 120MCG/0.5
0.5 KIT INTRAMUSCULAR ONCE
Qty: 1 EACH | Refills: 0 | Status: CANCELLED | OUTPATIENT
Start: 2024-01-08 | End: 2024-01-08

## 2024-01-08 RX ORDER — LOSARTAN POTASSIUM 50 MG/1
50 TABLET ORAL DAILY
Qty: 90 TABLET | Refills: 0 | Status: SHIPPED | OUTPATIENT
Start: 2024-01-08 | End: 2024-02-08

## 2024-01-08 ASSESSMENT — ENCOUNTER SYMPTOMS
FREQUENCY: 0
FEVER: 0
DYSURIA: 0
ARTHRALGIAS: 0
NAUSEA: 0
PARESTHESIAS: 0
JOINT SWELLING: 0
PALPITATIONS: 0
EYE PAIN: 0
HEADACHES: 0
MYALGIAS: 0
HEARTBURN: 0
DIZZINESS: 0
HEMATURIA: 0
COUGH: 1
CONSTIPATION: 0
DIARRHEA: 0
SHORTNESS OF BREATH: 0
NERVOUS/ANXIOUS: 0
HEMATOCHEZIA: 0
ABDOMINAL PAIN: 0
CHILLS: 0
SORE THROAT: 0
WEAKNESS: 1

## 2024-01-08 ASSESSMENT — PATIENT HEALTH QUESTIONNAIRE - PHQ9
SUM OF ALL RESPONSES TO PHQ QUESTIONS 1-9: 9
SUM OF ALL RESPONSES TO PHQ QUESTIONS 1-9: 9
10. IF YOU CHECKED OFF ANY PROBLEMS, HOW DIFFICULT HAVE THESE PROBLEMS MADE IT FOR YOU TO DO YOUR WORK, TAKE CARE OF THINGS AT HOME, OR GET ALONG WITH OTHER PEOPLE: NOT DIFFICULT AT ALL

## 2024-01-08 ASSESSMENT — ACTIVITIES OF DAILY LIVING (ADL): CURRENT_FUNCTION: NO ASSISTANCE NEEDED

## 2024-01-08 NOTE — PATIENT INSTRUCTIONS
Get your RSV and Pneumonia shot at the pharmacy!    Please return with your cuff and recheck the BP; I would suggest medication at this time    Patient Education   Personalized Prevention Plan  You are due for the preventive services outlined below.  Your care team is available to assist you in scheduling these services.  If you have already completed any of these items, please share that information with your care team to update in your medical record.  Health Maintenance Due   Topic Date Due    RSV VACCINE (Pregnancy & 60+) (1 - 1-dose 60+ series) Never done    AORTIC ANEURYSM SCREENING (SYSTEM ASSIGNED)  Never done    Pneumococcal Vaccine (2 of 2 - PCV) 01/17/2021    Flu Vaccine (1) 09/01/2023    COVID-19 Vaccine (6 - 2023-24 season) 09/01/2023    Annual Wellness Visit  11/10/2023    ANNUAL REVIEW OF HM ORDERS  11/10/2023     Learning About Depression Screening  What is depression screening?  Depression screening is a way to see if you have depression symptoms. It may be done by a doctor or counselor. It's often part of a routine checkup. That's because your mental health is just as important as your physical health.  Depression is a mental health condition that affects how you feel, think, and act. You may:  Have less energy.  Lose interest in your daily activities.  Feel sad and grouchy for a long time.  Depression is very common. It affects people of all ages.  Many things can lead to depression. Some people become depressed after they have a stroke or find out they have a major illness like cancer or heart disease. The death of a loved one or a breakup may lead to depression. It can run in families. Most experts believe that a combination of inherited genes and stressful life events can cause it.  What happens during screening?  You may be asked to fill out a form about your depression symptoms. You and the doctor will discuss your answers. The doctor may ask you more questions to learn more about how you  "think, act, and feel.  What happens after screening?  If you have symptoms of depression, your doctor will talk to you about your options.  Doctors usually treat depression with medicines or counseling. Often, combining the two works best. Many people don't get help because they think that they'll get over the depression on their own. But people with depression may not get better unless they get treatment.  The cause of depression is not well understood. There may be many factors involved. But if you have depression, it's not your fault.  A serious symptom of depression is thinking about death or suicide. If you or someone you care about talks about this or about feeling hopeless, get help right away.  It's important to know that depression can be treated. Medicine, counseling, and self-care may help.  Where can you learn more?  Go to https://www.ibabybox.net/patiented  Enter T185 in the search box to learn more about \"Learning About Depression Screening.\"  Current as of: June 25, 2023               Content Version: 13.8    5946-8215 [employer].   Care instructions adapted under license by your healthcare professional. If you have questions about a medical condition or this instruction, always ask your healthcare professional. [employer] disclaims any warranty or liability for your use of this information.         "

## 2024-01-08 NOTE — PROGRESS NOTES
"SUBJECTIVE:   Alejo is a 68 year old, presenting for the following:  Medicare Visit        1/8/2024     9:25 AM   Additional Questions   Roomed by Bhupinder NICHOLSON   Accompanied by No one         1/8/2024     9:25 AM   Patient Reported Additional Medications   Patient reports taking the following new medications metamucil       Are you in the first 12 months of your Medicare coverage?  No    Healthy Habits:     In general, how would you rate your overall health?  Fair    Frequency of exercise:  6-7 days/week    Duration of exercise:  15-30 minutes    Do you usually eat at least 4 servings of fruit and vegetables a day, include whole grains    & fiber and avoid regularly eating high fat or \"junk\" foods?  No    Taking medications regularly:  Yes    Medication side effects:  None    Ability to successfully perform activities of daily living:  No assistance needed    Home Safety:  No safety concerns identified    Hearing Impairment:  Difficulty following a conversation in a noisy restaurant or crowded room    In the past 6 months, have you been bothered by leaking of urine?  No    In general, how would you rate your overall mental or emotional health?  Fair    Additional concerns today:  No      Lev LIN Marianna is a 68 year old male who presents today for annual check up  Overall it has not been a good year for him  Continues to deal with ongoing extremity weakness 2/2 chronic spine concerns  -does think 20 minutes on eliptical recently has made a big difference  This was the first exercise he's done in awhile  He was afraid from the standpoint of his back  Dealing with some chronic sinus issues; seeing ENT    Planning for cervical fusion and lumbar decompression through [place] this year  He is really excited    Moods are still borderline; didn't hunt this year because \"didn't even want to go\"  Still thinks venlafaxine has been slightly helpful      Today's PHQ-9 Score:       1/8/2024     9:19 AM   PHQ-9 SCORE   PHQ-9 Total " Score MyChart 9 (Mild depression)   PHQ-9 Total Score 9           Have you ever done Advance Care Planning? (For example, a Health Directive, POLST, or a discussion with a medical provider or your loved ones about your wishes): No, advance care planning information given to patient to review.  Patient declined advance care planning discussion at this time.       Fall risk  Fallen 2 or more times in the past year?: Yes  Any fall with injury in the past year?: No    Cognitive Screening   1) Repeat 3 items (Leader, Season, Table)    2) Clock draw: NORMAL  3) 3 item recall: Recalls 3 objects  Results: 3 items recalled: COGNITIVE IMPAIRMENT LESS LIKELY    Mini-CogTM Copyright S Alena. Licensed by the author for use in [facility]; reprinted with permission (katie@[facility]). All rights reserved.      Do you have sleep apnea, excessive snoring or daytime drowsiness? : daytime drowsiness    Reviewed and updated as needed this visit by clinical staff   Tobacco  Allergies  Meds              Reviewed and updated as needed this visit by Provider                 Social History     Tobacco Use    Smoking status: Never     Passive exposure: Never    Smokeless tobacco: Never   Substance Use Topics    Alcohol use: No     Alcohol/week: 0.0 standard drinks of alcohol             1/1/2024    12:55 PM   Alcohol Use   Prescreen: >3 drinks/day or >7 drinks/week? No     Do you have a current opioid prescription? No  Do you use any other controlled substances or medications that are not prescribed by a provider? None          Current providers sharing in care for this patient include:   Patient Care Team:  Cr Corley PA-C as PCP - General (Physician Assistant)  Cr Corley PA-C as Assigned PCP  Joe Brady PA-C as Assigned Musculoskeletal Provider    The following health maintenance items are reviewed in Epic and correct as of today:  Health Maintenance   Topic Date Due    RSV VACCINE  "(Pregnancy & 60+) (1 - 1-dose 60+ series) Never done    AORTIC ANEURYSM SCREENING (SYSTEM ASSIGNED)  Never done    Pneumococcal Vaccine: 65+ Years (2 of 2 - PCV) 01/17/2021    INFLUENZA VACCINE (1) 09/01/2023    COVID-19 Vaccine (6 - 2023-24 season) 09/01/2023    MEDICARE ANNUAL WELLNESS VISIT  11/10/2023    ANNUAL REVIEW OF HM ORDERS  11/10/2023    DTAP/TDAP/TD IMMUNIZATION (2 - Td or Tdap) 05/01/2024    FALL RISK ASSESSMENT  01/08/2025    COLORECTAL CANCER SCREENING  02/09/2027    LIPID  11/10/2027    ADVANCE CARE PLANNING  11/11/2027    HEPATITIS C SCREENING  Completed    PHQ-2 (once per calendar year)  Completed    ZOSTER IMMUNIZATION  Completed    IPV IMMUNIZATION  Aged Out    HPV IMMUNIZATION  Aged Out    MENINGITIS IMMUNIZATION  Aged Out    RSV MONOCLONAL ANTIBODY  Aged Out     Lab work is in process  Labs reviewed in EPIC          Review of Systems   Constitutional:  Negative for chills and fever.   HENT:  Positive for congestion and hearing loss. Negative for ear pain and sore throat.    Eyes:  Negative for pain and visual disturbance.   Respiratory:  Positive for cough. Negative for shortness of breath.    Cardiovascular:  Negative for chest pain, palpitations and peripheral edema.   Gastrointestinal:  Negative for abdominal pain, constipation, diarrhea, heartburn, hematochezia and nausea.   Genitourinary:  Positive for impotence. Negative for dysuria, frequency, genital sores, hematuria, penile discharge and urgency.   Musculoskeletal:  Negative for arthralgias, joint swelling and myalgias.   Skin:  Negative for rash.   Neurological:  Positive for weakness. Negative for dizziness, headaches and paresthesias.   Psychiatric/Behavioral:  Negative for mood changes. The patient is not nervous/anxious.          OBJECTIVE:   BP (!) 178/110 (BP Location: Right arm, Patient Position: Sitting, Cuff Size: Adult Large)   Pulse 63   Temp 98  F (36.7  C) (Oral)   Resp 20   Ht 1.816 m (5' 11.5\")   Wt 106 kg (233 lb " "9.6 oz)   SpO2 95%   BMI 32.13 kg/m   Estimated body mass index is 32.13 kg/m  as calculated from the following:    Height as of this encounter: 1.816 m (5' 11.5\").    Weight as of this encounter: 106 kg (233 lb 9.6 oz).  Physical Exam  GENERAL: healthy, alert and no distress  EYES: Eyes grossly normal to inspection, PERRL and conjunctivae and sclerae normal  HENT: normal cephalic/atraumatic, ear canals and TM's normal, oropharynx clear, and oral mucous membranes moist  RESP: lungs clear to auscultation - no rales, rhonchi or wheezes  CV: regular rates and rhythm, normal S1 S2, no S3 or S4, and no murmur, click or rub  ABDOMEN: soft, nontender, no hepatosplenomegaly, no masses and bowel sounds normal  MS: No peripheral edema; there is lack of patellar reflex on the right  PSYCH: mentation appears normal and affect flat    Diagnostic Test Results:  Labs reviewed in Epic  Updating today    ASSESSMENT / PLAN:   1. Encounter for Medicare annual wellness exam  Reviewed personal and family history. Reviewed age appropriate screenings. Recommended any needed vaccinations.  - Lipid panel reflex to direct LDL Fasting; Future  - Comprehensive metabolic panel (BMP + Alb, Alk Phos, ALT, AST, Total. Bili, TP); Future  - Lipid panel reflex to direct LDL Fasting  - Comprehensive metabolic panel (BMP + Alb, Alk Phos, ALT, AST, Total. Bili, TP)    2. Essential hypertension  Significantly elevated today compared with last checks though he mentions last week at dentist this was under control. With his AAA I reviewed importance of control. Do not recommend BB with borderline bradycardia so starting below   - Lipid panel reflex to direct LDL Fasting; Future  - Comprehensive metabolic panel (BMP + Alb, Alk Phos, ALT, AST, Total. Bili, TP); Future  - losartan (COZAAR) 50 MG tablet; Take 1 tablet (50 mg) by mouth daily  Dispense: 90 tablet; Refill: 0  - Lipid panel reflex to direct LDL Fasting  - Comprehensive metabolic panel (BMP + Alb, " "Alk Phos, ALT, AST, Total. Bili, TP)    3. Cervical stenosis of spinal canal  Undergoing posterior fusion later this month    4. Chondrosarcoma of ribs (H)  5. Mild ascending aorta dilation (H24)  Screening thru Dr. Sterling later this month    6. Acute reaction to stress  Venlafaxine helpful though I still think Alejo shows signs of depression with all of the chronic health changes he's had lately. Plan to Continue present management. He declines therapy at this time          COUNSELING:  Reviewed preventive health counseling, as reflected in patient instructions      BMI:   Estimated body mass index is 32.13 kg/m  as calculated from the following:    Height as of this encounter: 1.816 m (5' 11.5\").    Weight as of this encounter: 106 kg (233 lb 9.6 oz).         He reports that he has never smoked. He has never been exposed to tobacco smoke. He has never used smokeless tobacco.      Appropriate preventive services were discussed with this patient, including applicable screening as appropriate for fall prevention, nutrition, physical activity, Tobacco-use cessation, weight loss and cognition.  Checklist reviewing preventive services available has been given to the patient.    Reviewed patients plan of care and provided an AVS. The Basic Care Plan (routine screening as documented in Health Maintenance) for Lev meets the Care Plan requirement. This Care Plan has been established and reviewed with the Patient.          GUY [facility]    Identified Health Risks:  I have reviewed Opioid Use Disorder and Substance Use Disorder risk factors and made any needed referrals.   "

## 2024-01-10 ENCOUNTER — HOSPITAL ENCOUNTER (OUTPATIENT)
Dept: CT IMAGING | Facility: CLINIC | Age: 69
Discharge: HOME | End: 2024-01-10
Attending: THORACIC SURGERY (CARDIOTHORACIC VASCULAR SURGERY) | Admitting: THORACIC SURGERY (CARDIOTHORACIC VASCULAR SURGERY)
Payer: MEDICARE

## 2024-01-10 DIAGNOSIS — C41.3: ICD-10-CM

## 2024-01-10 PROCEDURE — 71250 CT THORAX DX C-: CPT

## 2024-01-17 ENCOUNTER — ALLIED HEALTH/NURSE VISIT (OUTPATIENT)
Dept: FAMILY MEDICINE | Facility: CLINIC | Age: 69
End: 2024-01-17
Payer: MEDICARE

## 2024-01-17 VITALS — DIASTOLIC BLOOD PRESSURE: 86 MMHG | SYSTOLIC BLOOD PRESSURE: 164 MMHG

## 2024-01-17 DIAGNOSIS — Z01.30 BP CHECK: Primary | ICD-10-CM

## 2024-01-17 DIAGNOSIS — I10 ESSENTIAL HYPERTENSION: ICD-10-CM

## 2024-01-17 PROCEDURE — 99207 PR NO CHARGE LOS: CPT | Performed by: PHYSICIAN ASSISTANT

## 2024-01-17 RX ORDER — HYDROCHLOROTHIAZIDE 25 MG/1
25 TABLET ORAL DAILY
Qty: 90 TABLET | Refills: 0 | Status: SHIPPED | OUTPATIENT
Start: 2024-01-17 | End: 2024-02-08

## 2024-01-17 NOTE — PROGRESS NOTES
Called the pt and advised of below.  He didn't write down the numbers.  He thought they were in the 130/80's.  He thinks he gets all excited when he is here.  He said he will go get the prescription.  He said he will then monitor it.  He said he will write down the numbers.      His biggest fear - his bp is good.  Then he goes into surgery and it is elevated.       Advised to let us know if he has any concerns after starting bp med.  Advised I will let Wilder Corley know.

## 2024-01-17 NOTE — PROGRESS NOTES
Called the pt to advise of below.      He said yesterday his bp at home was 127/74.  Today Kayden checked the pts bp and with his machine at the clinic and it was 160/90 here at the clinic, similar to what Kayden had gotten.  He does not understand why his bp was 127/74 yesterday and higher today.  Advised your bp can vary.  Advised not good to be too high or you are more at risk for strokes, heart events.     He has a surgery next week so he is concerned about adding another medication.      Advised I can forward this to Wilder Corley.

## 2024-01-17 NOTE — PROGRESS NOTES
Lev Cabral was evaluated at [facility] on January 17, 2024 at which time his blood pressure was:    BP Readings from Last 1 Encounters:   01/17/24 (!) 164/86     No data recorded      Reviewed lifestyle modifications for blood pressure control and reduction: including making healthy food choices, managing weight, getting regular exercise, smoking cessation, reducing alcohol consumption, monitoring blood pressure regularly.     Symptoms: None    BP Goal:< 140/90 mmHg    BP Assessment:  BP too high    Potential Reasons for BP too high: NA - Not applicable    BP Follow-Up Plan: Recheck BP in 30 days at pharmacy    Recommendation to Provider: Recheck blood pressure within 30 days.    Note completed by: Kayden Mariscal RPH     Thank You   Selma Live  [facility]

## 2024-01-17 NOTE — PROGRESS NOTES
Continue current medication and I am adding a diuretic medication as well. Take at the same time as losartan. Could cause some slight increase in urination for the first week.

## 2024-01-17 NOTE — PROGRESS NOTES
What have his other readings been like at home since starting the rx. Were those also normal or has he had others that were elevated?

## 2024-01-25 ENCOUNTER — HOSPITAL ENCOUNTER (INPATIENT)
Facility: CLINIC | Age: 69
LOS: 9 days | Discharge: HOME | DRG: 947 | End: 2024-02-04
Attending: EMERGENCY MEDICINE | Admitting: HOSPITALIST
Payer: MEDICARE

## 2024-01-25 DIAGNOSIS — J18.9 PNEUMONIA OF RIGHT MIDDLE LOBE DUE TO INFECTIOUS ORGANISM: Primary | ICD-10-CM

## 2024-01-25 DIAGNOSIS — G89.18 ACUTE POST-OPERATIVE PAIN: ICD-10-CM

## 2024-01-25 LAB
ANION GAP SERPL CALCULATED.3IONS-SCNC: 8 MMOL/L (ref 7–15)
BASOPHILS # BLD AUTO: 0 10E3/UL (ref 0–0.2)
BASOPHILS NFR BLD AUTO: 0 %
BUN SERPL-MCNC: 18.7 MG/DL (ref 8–23)
CALCIUM SERPL-MCNC: 8.8 MG/DL (ref 8.8–10.2)
CHLORIDE SERPL-SCNC: 94 MMOL/L (ref 98–107)
CREAT SERPL-MCNC: 0.8 MG/DL (ref 0.67–1.17)
DEPRECATED HCO3 PLAS-SCNC: 27 MMOL/L (ref 22–29)
EGFRCR SERPLBLD CKD-EPI 2021: >90 ML/MIN/1.73M2
EOSINOPHIL # BLD AUTO: 0 10E3/UL (ref 0–0.7)
EOSINOPHIL NFR BLD AUTO: 0 %
ERYTHROCYTE [DISTWIDTH] IN BLOOD BY AUTOMATED COUNT: 12.7 % (ref 10–15)
GLUCOSE BLDC GLUCOMTR-MCNC: 177 MG/DL (ref 70–99)
GLUCOSE SERPL-MCNC: 152 MG/DL (ref 70–99)
HCT VFR BLD AUTO: 43.2 % (ref 40–53)
HGB BLD-MCNC: 15.1 G/DL (ref 13.3–17.7)
IMM GRANULOCYTES # BLD: 0.1 10E3/UL
IMM GRANULOCYTES NFR BLD: 0 %
LYMPHOCYTES # BLD AUTO: 1.4 10E3/UL (ref 0.8–5.3)
LYMPHOCYTES NFR BLD AUTO: 10 %
MCH RBC QN AUTO: 30.9 PG (ref 26.5–33)
MCHC RBC AUTO-ENTMCNC: 35 G/DL (ref 31.5–36.5)
MCV RBC AUTO: 89 FL (ref 78–100)
MONOCYTES # BLD AUTO: 1.5 10E3/UL (ref 0–1.3)
MONOCYTES NFR BLD AUTO: 10 %
NEUTROPHILS # BLD AUTO: 11.7 10E3/UL (ref 1.6–8.3)
NEUTROPHILS NFR BLD AUTO: 80 %
NRBC # BLD AUTO: 0 10E3/UL
NRBC BLD AUTO-RTO: 0 /100
PLATELET # BLD AUTO: 176 10E3/UL (ref 150–450)
POTASSIUM SERPL-SCNC: 8.7 MMOL/L (ref 3.4–5.3)
RBC # BLD AUTO: 4.88 10E6/UL (ref 4.4–5.9)
SODIUM SERPL-SCNC: 129 MMOL/L (ref 135–145)
WBC # BLD AUTO: 14.7 10E3/UL (ref 4–11)

## 2024-01-25 PROCEDURE — 85025 COMPLETE CBC W/AUTO DIFF WBC: CPT | Performed by: EMERGENCY MEDICINE

## 2024-01-25 PROCEDURE — 80048 BASIC METABOLIC PNL TOTAL CA: CPT | Performed by: EMERGENCY MEDICINE

## 2024-01-25 PROCEDURE — 96375 TX/PRO/DX INJ NEW DRUG ADDON: CPT

## 2024-01-25 PROCEDURE — 84132 ASSAY OF SERUM POTASSIUM: CPT | Performed by: EMERGENCY MEDICINE

## 2024-01-25 PROCEDURE — 93005 ELECTROCARDIOGRAM TRACING: CPT

## 2024-01-25 PROCEDURE — 99285 EMERGENCY DEPT VISIT HI MDM: CPT | Mod: 25

## 2024-01-25 PROCEDURE — 36415 COLL VENOUS BLD VENIPUNCTURE: CPT | Performed by: EMERGENCY MEDICINE

## 2024-01-25 PROCEDURE — 82962 GLUCOSE BLOOD TEST: CPT

## 2024-01-25 PROCEDURE — 250N000011 HC RX IP 250 OP 636: Performed by: EMERGENCY MEDICINE

## 2024-01-25 PROCEDURE — 96374 THER/PROPH/DIAG INJ IV PUSH: CPT

## 2024-01-25 RX ORDER — HYDROMORPHONE HYDROCHLORIDE 1 MG/ML
0.5 INJECTION, SOLUTION INTRAMUSCULAR; INTRAVENOUS; SUBCUTANEOUS ONCE
Status: COMPLETED | OUTPATIENT
Start: 2024-01-25 | End: 2024-01-25

## 2024-01-25 RX ORDER — DIAZEPAM 10 MG/2ML
2.5 INJECTION, SOLUTION INTRAMUSCULAR; INTRAVENOUS ONCE
Status: COMPLETED | OUTPATIENT
Start: 2024-01-25 | End: 2024-01-25

## 2024-01-25 RX ORDER — ONDANSETRON 2 MG/ML
4 INJECTION INTRAMUSCULAR; INTRAVENOUS ONCE
Status: COMPLETED | OUTPATIENT
Start: 2024-01-25 | End: 2024-01-25

## 2024-01-25 RX ADMIN — ONDANSETRON 4 MG: 2 INJECTION INTRAMUSCULAR; INTRAVENOUS at 22:50

## 2024-01-25 RX ADMIN — HYDROMORPHONE HYDROCHLORIDE 0.5 MG: 1 INJECTION, SOLUTION INTRAMUSCULAR; INTRAVENOUS; SUBCUTANEOUS at 23:05

## 2024-01-25 RX ADMIN — DIAZEPAM 2.5 MG: 5 INJECTION INTRAMUSCULAR; INTRAVENOUS at 23:05

## 2024-01-25 RX ADMIN — HYDROMORPHONE HYDROCHLORIDE 0.5 MG: 1 INJECTION, SOLUTION INTRAMUSCULAR; INTRAVENOUS; SUBCUTANEOUS at 22:49

## 2024-01-25 ASSESSMENT — ACTIVITIES OF DAILY LIVING (ADL): ADLS_ACUITY_SCORE: 35

## 2024-01-26 ENCOUNTER — APPOINTMENT (OUTPATIENT)
Dept: PHYSICAL THERAPY | Facility: CLINIC | Age: 69
DRG: 947 | End: 2024-01-26
Attending: HOSPITALIST
Payer: MEDICARE

## 2024-01-26 PROBLEM — G89.18 ACUTE POST-OPERATIVE PAIN: Status: ACTIVE | Noted: 2024-01-26

## 2024-01-26 LAB
ANION GAP SERPL CALCULATED.3IONS-SCNC: 10 MMOL/L (ref 7–15)
ATRIAL RATE - MUSE: 67 BPM
BUN SERPL-MCNC: 23.1 MG/DL (ref 8–23)
CALCIUM SERPL-MCNC: 8.8 MG/DL (ref 8.8–10.2)
CHLORIDE SERPL-SCNC: 95 MMOL/L (ref 98–107)
CREAT SERPL-MCNC: 0.95 MG/DL (ref 0.67–1.17)
DEPRECATED HCO3 PLAS-SCNC: 30 MMOL/L (ref 22–29)
DIASTOLIC BLOOD PRESSURE - MUSE: NORMAL MMHG
EGFRCR SERPLBLD CKD-EPI 2021: 87 ML/MIN/1.73M2
GLUCOSE SERPL-MCNC: 188 MG/DL (ref 70–99)
HOLD SPECIMEN: NORMAL
INTERPRETATION ECG - MUSE: NORMAL
P AXIS - MUSE: 77 DEGREES
POTASSIUM SERPL-SCNC: 3.2 MMOL/L (ref 3.4–5.3)
POTASSIUM SERPL-SCNC: 4 MMOL/L (ref 3.4–5.3)
POTASSIUM SERPL-SCNC: 4.1 MMOL/L (ref 3.4–5.3)
PR INTERVAL - MUSE: 194 MS
QRS DURATION - MUSE: 116 MS
QT - MUSE: 422 MS
QTC - MUSE: 445 MS
R AXIS - MUSE: 61 DEGREES
SODIUM SERPL-SCNC: 135 MMOL/L (ref 135–145)
SYSTOLIC BLOOD PRESSURE - MUSE: NORMAL MMHG
T AXIS - MUSE: 52 DEGREES
VENTRICULAR RATE- MUSE: 67 BPM

## 2024-01-26 PROCEDURE — 97161 PT EVAL LOW COMPLEX 20 MIN: CPT | Mod: GP | Performed by: PHYSICAL THERAPIST

## 2024-01-26 PROCEDURE — 99223 1ST HOSP IP/OBS HIGH 75: CPT | Mod: AI | Performed by: HOSPITALIST

## 2024-01-26 PROCEDURE — 96374 THER/PROPH/DIAG INJ IV PUSH: CPT

## 2024-01-26 PROCEDURE — 250N000013 HC RX MED GY IP 250 OP 250 PS 637: Performed by: HOSPITALIST

## 2024-01-26 PROCEDURE — 36415 COLL VENOUS BLD VENIPUNCTURE: CPT | Performed by: HOSPITALIST

## 2024-01-26 PROCEDURE — 99203 OFFICE O/P NEW LOW 30 MIN: CPT | Performed by: PHYSICIAN ASSISTANT

## 2024-01-26 PROCEDURE — G0378 HOSPITAL OBSERVATION PER HR: HCPCS

## 2024-01-26 PROCEDURE — 96375 TX/PRO/DX INJ NEW DRUG ADDON: CPT

## 2024-01-26 PROCEDURE — 97530 THERAPEUTIC ACTIVITIES: CPT | Mod: GP | Performed by: PHYSICAL THERAPIST

## 2024-01-26 PROCEDURE — 250N000011 HC RX IP 250 OP 636: Performed by: EMERGENCY MEDICINE

## 2024-01-26 PROCEDURE — 80048 BASIC METABOLIC PNL TOTAL CA: CPT | Performed by: HOSPITALIST

## 2024-01-26 PROCEDURE — 250N000011 HC RX IP 250 OP 636: Performed by: HOSPITALIST

## 2024-01-26 PROCEDURE — 120N000001 HC R&B MED SURG/OB

## 2024-01-26 PROCEDURE — 84132 ASSAY OF SERUM POTASSIUM: CPT | Performed by: HOSPITALIST

## 2024-01-26 RX ORDER — NALOXONE HYDROCHLORIDE 0.4 MG/ML
0.2 INJECTION, SOLUTION INTRAMUSCULAR; INTRAVENOUS; SUBCUTANEOUS
Status: DISCONTINUED | OUTPATIENT
Start: 2024-01-26 | End: 2024-02-04 | Stop reason: HOSPADM

## 2024-01-26 RX ORDER — ONDANSETRON 2 MG/ML
4 INJECTION INTRAMUSCULAR; INTRAVENOUS EVERY 6 HOURS PRN
Status: DISCONTINUED | OUTPATIENT
Start: 2024-01-26 | End: 2024-02-04 | Stop reason: HOSPADM

## 2024-01-26 RX ORDER — DIAZEPAM 5 MG
5 TABLET ORAL 3 TIMES DAILY PRN
Status: ON HOLD | COMMUNITY
End: 2024-02-04

## 2024-01-26 RX ORDER — AMOXICILLIN 250 MG
1 CAPSULE ORAL DAILY
COMMUNITY
End: 2024-02-08

## 2024-01-26 RX ORDER — NALOXONE HYDROCHLORIDE 0.4 MG/ML
0.4 INJECTION, SOLUTION INTRAMUSCULAR; INTRAVENOUS; SUBCUTANEOUS
Status: DISCONTINUED | OUTPATIENT
Start: 2024-01-26 | End: 2024-02-04 | Stop reason: HOSPADM

## 2024-01-26 RX ORDER — METHOCARBAMOL 500 MG/1
500 TABLET, FILM COATED ORAL 4 TIMES DAILY PRN
Status: DISCONTINUED | OUTPATIENT
Start: 2024-01-26 | End: 2024-01-29

## 2024-01-26 RX ORDER — ONDANSETRON 4 MG/1
4 TABLET, ORALLY DISINTEGRATING ORAL EVERY 6 HOURS PRN
Status: DISCONTINUED | OUTPATIENT
Start: 2024-01-26 | End: 2024-02-04 | Stop reason: HOSPADM

## 2024-01-26 RX ORDER — DIAZEPAM 5 MG
2.5 TABLET ORAL EVERY 6 HOURS PRN
Qty: 10 TABLET | Refills: 0 | Status: SHIPPED | OUTPATIENT
Start: 2024-01-26 | End: 2024-02-02

## 2024-01-26 RX ORDER — HYDROXYZINE HYDROCHLORIDE 25 MG/1
25 TABLET, FILM COATED ORAL EVERY 6 HOURS PRN
Status: DISCONTINUED | OUTPATIENT
Start: 2024-01-26 | End: 2024-02-04 | Stop reason: HOSPADM

## 2024-01-26 RX ORDER — HYDROXYZINE HYDROCHLORIDE 50 MG/1
50 TABLET, FILM COATED ORAL EVERY 6 HOURS PRN
Status: DISCONTINUED | OUTPATIENT
Start: 2024-01-26 | End: 2024-01-29

## 2024-01-26 RX ORDER — LANOLIN ALCOHOL/MO/W.PET/CERES
3 CREAM (GRAM) TOPICAL
Status: DISCONTINUED | OUTPATIENT
Start: 2024-01-26 | End: 2024-01-31

## 2024-01-26 RX ORDER — HYDROMORPHONE HCL IN WATER/PF 6 MG/30 ML
0.2 PATIENT CONTROLLED ANALGESIA SYRINGE INTRAVENOUS
Status: DISCONTINUED | OUTPATIENT
Start: 2024-01-26 | End: 2024-01-29

## 2024-01-26 RX ORDER — LIDOCAINE 4 G/G
2 PATCH TOPICAL
Status: DISCONTINUED | OUTPATIENT
Start: 2024-01-26 | End: 2024-01-31

## 2024-01-26 RX ORDER — OXYCODONE HYDROCHLORIDE 5 MG/1
5 TABLET ORAL EVERY 4 HOURS PRN
Status: ON HOLD | COMMUNITY
End: 2024-02-04

## 2024-01-26 RX ORDER — HYDROMORPHONE HYDROCHLORIDE 2 MG/1
2 TABLET ORAL EVERY 6 HOURS PRN
Qty: 10 TABLET | Refills: 0 | Status: SHIPPED | OUTPATIENT
Start: 2024-01-26 | End: 2024-01-29

## 2024-01-26 RX ORDER — POLYETHYLENE GLYCOL 3350 17 G/17G
17 POWDER, FOR SOLUTION ORAL 2 TIMES DAILY PRN
Status: DISCONTINUED | OUTPATIENT
Start: 2024-01-26 | End: 2024-01-31

## 2024-01-26 RX ORDER — AMOXICILLIN 250 MG
2 CAPSULE ORAL 2 TIMES DAILY PRN
Status: DISCONTINUED | OUTPATIENT
Start: 2024-01-26 | End: 2024-01-31

## 2024-01-26 RX ORDER — KETOROLAC TROMETHAMINE 15 MG/ML
15 INJECTION, SOLUTION INTRAMUSCULAR; INTRAVENOUS ONCE
Status: COMPLETED | OUTPATIENT
Start: 2024-01-26 | End: 2024-01-26

## 2024-01-26 RX ORDER — DIAZEPAM 10 MG/2ML
5 INJECTION, SOLUTION INTRAMUSCULAR; INTRAVENOUS ONCE
Status: COMPLETED | OUTPATIENT
Start: 2024-01-26 | End: 2024-01-26

## 2024-01-26 RX ORDER — POTASSIUM CHLORIDE 1500 MG/1
40 TABLET, EXTENDED RELEASE ORAL ONCE
Status: COMPLETED | OUTPATIENT
Start: 2024-01-26 | End: 2024-01-26

## 2024-01-26 RX ORDER — OXYCODONE HYDROCHLORIDE 5 MG/1
5 TABLET ORAL EVERY 4 HOURS PRN
Status: DISCONTINUED | OUTPATIENT
Start: 2024-01-26 | End: 2024-01-29

## 2024-01-26 RX ORDER — ACETAMINOPHEN 325 MG/1
975 TABLET ORAL 3 TIMES DAILY
Status: DISCONTINUED | OUTPATIENT
Start: 2024-01-26 | End: 2024-01-31

## 2024-01-26 RX ORDER — HYDROMORPHONE HCL IN WATER/PF 6 MG/30 ML
0.4 PATIENT CONTROLLED ANALGESIA SYRINGE INTRAVENOUS
Status: DISCONTINUED | OUTPATIENT
Start: 2024-01-26 | End: 2024-01-27

## 2024-01-26 RX ORDER — DEXAMETHASONE SODIUM PHOSPHATE 4 MG/ML
4 INJECTION, SOLUTION INTRA-ARTICULAR; INTRALESIONAL; INTRAMUSCULAR; INTRAVENOUS; SOFT TISSUE ONCE
Status: COMPLETED | OUTPATIENT
Start: 2024-01-26 | End: 2024-01-26

## 2024-01-26 RX ORDER — ACETAMINOPHEN 500 MG
1000 TABLET ORAL 4 TIMES DAILY
Status: ON HOLD | COMMUNITY
End: 2024-02-04

## 2024-01-26 RX ORDER — AMOXICILLIN 250 MG
1 CAPSULE ORAL 2 TIMES DAILY PRN
Status: DISCONTINUED | OUTPATIENT
Start: 2024-01-26 | End: 2024-01-31

## 2024-01-26 RX ADMIN — MENTHOL 1 PATCH: 205.5 PATCH TOPICAL at 02:15

## 2024-01-26 RX ADMIN — HYDROXYZINE HYDROCHLORIDE 50 MG: 50 TABLET, FILM COATED ORAL at 08:11

## 2024-01-26 RX ADMIN — DIAZEPAM 5 MG: 5 INJECTION INTRAMUSCULAR; INTRAVENOUS at 01:44

## 2024-01-26 RX ADMIN — ACETAMINOPHEN 975 MG: 325 TABLET, FILM COATED ORAL at 07:45

## 2024-01-26 RX ADMIN — HYDROXYZINE HYDROCHLORIDE 25 MG: 25 TABLET, FILM COATED ORAL at 20:31

## 2024-01-26 RX ADMIN — KETOROLAC TROMETHAMINE 15 MG: 15 INJECTION, SOLUTION INTRAMUSCULAR; INTRAVENOUS at 01:45

## 2024-01-26 RX ADMIN — DICLOFENAC SODIUM TOPICAL GEL, 1% 4 G: 10 GEL TOPICAL at 07:46

## 2024-01-26 RX ADMIN — POTASSIUM CHLORIDE 40 MEQ: 1500 TABLET, EXTENDED RELEASE ORAL at 02:22

## 2024-01-26 RX ADMIN — ACETAMINOPHEN 975 MG: 325 TABLET, FILM COATED ORAL at 14:18

## 2024-01-26 RX ADMIN — DICLOFENAC SODIUM TOPICAL GEL, 1% 4 G: 10 GEL TOPICAL at 16:03

## 2024-01-26 RX ADMIN — METHOCARBAMOL 500 MG: 500 TABLET ORAL at 14:18

## 2024-01-26 RX ADMIN — ACETAMINOPHEN 975 MG: 325 TABLET, FILM COATED ORAL at 20:31

## 2024-01-26 RX ADMIN — OXYCODONE HYDROCHLORIDE 5 MG: 5 TABLET ORAL at 10:50

## 2024-01-26 RX ADMIN — DICLOFENAC SODIUM TOPICAL GEL, 1% 4 G: 10 GEL TOPICAL at 13:10

## 2024-01-26 RX ADMIN — OXYCODONE HYDROCHLORIDE 2.5 MG: 5 TABLET ORAL at 18:13

## 2024-01-26 RX ADMIN — HYDROMORPHONE HYDROCHLORIDE 0.2 MG: 0.2 INJECTION, SOLUTION INTRAMUSCULAR; INTRAVENOUS; SUBCUTANEOUS at 20:52

## 2024-01-26 RX ADMIN — DICLOFENAC SODIUM TOPICAL GEL, 1% 4 G: 10 GEL TOPICAL at 20:32

## 2024-01-26 RX ADMIN — HYDROMORPHONE HYDROCHLORIDE 0.4 MG: 0.2 INJECTION, SOLUTION INTRAMUSCULAR; INTRAVENOUS; SUBCUTANEOUS at 08:11

## 2024-01-26 RX ADMIN — DEXAMETHASONE SODIUM PHOSPHATE 4 MG: 4 INJECTION, SOLUTION INTRAMUSCULAR; INTRAVENOUS at 01:44

## 2024-01-26 RX ADMIN — LIDOCAINE 2 PATCH: 4 PATCH TOPICAL at 07:45

## 2024-01-26 RX ADMIN — MENTHOL 1 PATCH: 205.5 PATCH TOPICAL at 10:51

## 2024-01-26 RX ADMIN — HYDROXYZINE HYDROCHLORIDE 25 MG: 25 TABLET, FILM COATED ORAL at 14:18

## 2024-01-26 RX ADMIN — METHOCARBAMOL 500 MG: 500 TABLET ORAL at 20:32

## 2024-01-26 RX ADMIN — METHOCARBAMOL 500 MG: 500 TABLET ORAL at 08:11

## 2024-01-26 ASSESSMENT — ACTIVITIES OF DAILY LIVING (ADL)
ADLS_ACUITY_SCORE: 35
ADLS_ACUITY_SCORE: 21
ADLS_ACUITY_SCORE: 35
ADLS_ACUITY_SCORE: 28
ADLS_ACUITY_SCORE: 35
ADLS_ACUITY_SCORE: 35
ADLS_ACUITY_SCORE: 28
ADLS_ACUITY_SCORE: 28
ADLS_ACUITY_SCORE: 35

## 2024-01-26 NOTE — ED TRIAGE NOTES
Spinal surgery at [place]. Discharged today. After returning home, developed neck and left arm pain/spasms. Called MD who instructed them to take 10mg oxycodone, took at 9pm. Pain improving in triage. CMS intact to left arm.

## 2024-01-26 NOTE — ED PROVIDER NOTES
History     Chief Complaint:  Neck Pain and Arm Pain       HPI   Lev Cabral is a 68 year old male who presents with severe neck and left-sided shoulder pain.  Patient is a 68-year-old male with a recent complicated medical history that includes laminectomy of the cervical spine.  This was performed at [facility] after MRI suggested cord abnormality singles and severe stenosis.  Patient was in the hospital from the 23rd to the 24th.  Was discharged today.  Was sent home and developed severe pain in his neck.  Patient called the clinic and was told to take 10 mg of oxycodone which she did.  Pain was severe and presents to the emergency room for assessment.  No weakness in the arm.  No chest pain or shortness of breath.  No fever.      Independent Historian:   None - Patient Only    Review of External Notes:   [facility] notes         Medications:    diazepam (VALIUM) 5 MG tablet  HYDROmorphone (DILAUDID) 2 MG tablet  hydrochlorothiazide (HYDRODIURIL) 25 MG tablet  losartan (COZAAR) 50 MG tablet  omeprazole (PRILOSEC) 20 MG DR capsule  psyllium (METAMUCIL) 58.6 % POWD  sildenafil (REVATIO) 20 MG tablet  venlafaxine (EFFEXOR XR) 150 MG 24 hr capsule  vitamin B-12 (CYANOCOBALAMIN) 2500 MCG sublingual tablet  vitamin C (ASCORBIC ACID) 1000 MG TABS  VITAMIN D, CHOLECALCIFEROL, PO        Past Medical History:    Past Medical History:   Diagnosis Date    Unspecified adjustment reaction        Past Surgical History:    Past Surgical History:   Procedure Laterality Date    APPENDECTOMY      COLONOSCOPY  2009    Dr. Oliva    COLONOSCOPY N/A 2/9/2017    Procedure: COLONOSCOPY;  Surgeon: Amos Loving MD;  Location:  GI    ENT SURGERY      tonsils    ORTHOPEDIC SURGERY  1999    neck fusion- 3 level    ORTHOPEDIC SURGERY Left     left knee arthroscopic    REPAIR CHEST WALL N/A 12/1/2017    Procedure: REPAIR CHEST WALL;  RESECTION OF LEFT ANTERIOR CHEST WALL MASS AND RECONSTRUCTION WITH GOR-TIM SOFT TISSUE PATCH, PRIMARY REPAIR  OF DIAPHRAGM;  Surgeon: Faizan Sterling MD;  Location:  OR        Physical Exam   Patient Vitals for the past 24 hrs:   BP Temp Temp src Pulse Resp SpO2   01/26/24 0030 130/81 -- -- 64 -- 99 %   01/26/24 0015 -- -- -- -- -- 98 %   01/26/24 0000 124/82 -- -- 61 -- 97 %   01/25/24 2330 116/72 -- -- 61 -- 96 %   01/25/24 2315 119/76 -- -- 62 -- --   01/25/24 2300 (!) 152/94 -- -- -- -- --   01/25/24 2152 (!) 127/105 97.1  F (36.2  C) Temporal 69 16 97 %        Physical Exam  Vitals reviewed.   Constitutional:       Comments: Uncomfortable appearing holding his neck.   HENT:      Head: Normocephalic.   Neck:      Comments: There is a midline bandage that was not removed no erythema or ecchymosis noted.  Tenderness with palpation diffusely over the left shoulder.  Cardiovascular:      Rate and Rhythm: Normal rate and regular rhythm.   Pulmonary:      Effort: Pulmonary effort is normal.   Abdominal:      General: Abdomen is flat. Bowel sounds are normal.   Musculoskeletal:         General: Normal range of motion.   Skin:     General: Skin is warm.      Capillary Refill: Capillary refill takes less than 2 seconds.   Neurological:      General: No focal deficit present.      Mental Status: He is alert.   Psychiatric:         Mood and Affect: Mood normal.           Emergency Department Course   EKG: Heart rate 67 bpm NH interval 194 ms  ms QT over QTc 422/445 consistent with sinus rhythm no ST or T wave abnormalities.    Imaging:  No orders to display          Laboratory:  Labs Ordered and Resulted from Time of ED Arrival to Time of ED Departure   BASIC METABOLIC PANEL - Abnormal       Result Value    Sodium 129 (*)     Potassium 8.7 (*)     Chloride 94 (*)     Carbon Dioxide (CO2) 27      Anion Gap 8      Urea Nitrogen 18.7      Creatinine 0.80      GFR Estimate >90      Calcium 8.8      Glucose 152 (*)    CBC WITH PLATELETS AND DIFFERENTIAL - Abnormal    WBC Count 14.7 (*)     RBC Count 4.88       Hemoglobin 15.1      Hematocrit 43.2      MCV 89      MCH 30.9      MCHC 35.0      RDW 12.7      Platelet Count 176      % Neutrophils 80      % Lymphocytes 10      % Monocytes 10      % Eosinophils 0      % Basophils 0      % Immature Granulocytes 0      NRBCs per 100 WBC 0      Absolute Neutrophils 11.7 (*)     Absolute Lymphocytes 1.4      Absolute Monocytes 1.5 (*)     Absolute Eosinophils 0.0      Absolute Basophils 0.0      Absolute Immature Granulocytes 0.1      Absolute NRBCs 0.0     GLUCOSE BY METER - Abnormal    GLUCOSE BY METER POCT 177 (*)    POTASSIUM - Abnormal    Potassium 3.2 (*)           Emergency Department Course & Assessments:             Interventions:  Medications   HYDROmorphone (PF) (DILAUDID) injection 0.5 mg (0.5 mg Intravenous $Given 1/25/24 2249)   ondansetron (ZOFRAN) injection 4 mg (4 mg Intravenous $Given 1/25/24 2250)   diazepam (VALIUM) injection 2.5 mg (2.5 mg Intravenous $Given 1/25/24 2305)   HYDROmorphone (PF) (DILAUDID) injection 0.5 mg (0.5 mg Intravenous $Given 1/25/24 2305)   diazepam (VALIUM) injection 5 mg (5 mg Intravenous $Given 1/26/24 0144)   ketorolac (TORADOL) injection 15 mg (15 mg Intravenous $Given 1/26/24 0145)   dexAMETHasone (DECADRON) injection 4 mg (4 mg Intravenous $Given 1/26/24 0144)        Assessments:      Independent Interpretation (X-rays, CTs, rhythm strip):  None    Consultations/Discussion of Management or Tests:  Care discussed with the on-call [place] orthopedic physician who states [facility] is on divert and cannot accept transfers.       Social Determinants of Health affecting care:   None    Disposition:  The patient was admitted to the hospital under the care of Dr. Wasdworth.     Impression & Plan        Medical Decision Making:  Patient is a 68-year-old male who presents for severe neck pain postoperatively.  Patient had an operation at [facility].  In was discharged today.  Clinically suspect postoperative pain.  No neurological symptoms.  Pain is  severe and patient is very uncomfortable.  Anti-inflammatories and medication for pain were titrated initially pain relief was achieved while sitting in the chair and was offered discharge home at discharge patient went to stand up and had severe pain in his neck and could not stand or walk.  I due to severe pain in his neck offered observation admission initially consider transfer to [facility] however apparently [facility] is not willing to accept the patient and return due to capacity.  Care was then discussed with the hospitalist here and was offered observation admission in this hospital for pain control postoperatively which was not ideal but no other options were available due to [facility]'s refusal and transfer and capacity limitations.      Diagnosis:    ICD-10-CM    1. Acute post-operative pain  G89.18            Discharge Medications:  New Prescriptions    DIAZEPAM (VALIUM) 5 MG TABLET    Take 0.5 tablets (2.5 mg) by mouth every 6 hours as needed for muscle spasms (MUSCLE SPASM)    HYDROMORPHONE (DILAUDID) 2 MG TABLET    Take 1 tablet (2 mg) by mouth every 6 hours as needed for pain          Yadiel Snyder MD  1/26/2024   Yadiel Snyder MD Goodman, Brian Samuel, MD  01/26/24 0157

## 2024-01-26 NOTE — DISCHARGE INSTRUCTIONS
We are adjusting narcotics to assist in postoperative pain.  Please take Dilaudid or oxycodone for pain but not both.  Use Valium as a muscle relaxant.  If you develop a fever if pain escalates while on the medication either return to the emergency room or follow-up with your [place] orthopedist.  We did reach out to them this evening and they did not answer the call but if they returned our phone call we will let them know your postoperative course.  We hope you heal better and hopefully things improve with time.

## 2024-01-26 NOTE — PROGRESS NOTES
Patient seen and examined. H&P reviewed. Patient admitted this morning by Dr. Wadsworth for postop pain. Will continue pain meds. PT, neurosurgery consulted.

## 2024-01-26 NOTE — ED NOTES
Pt appear in severe pain when care assume, restless when assisted to bed, stated he only felt better when leaning forward. Brought a recliner for pt, pt appears more comfortable. Pt medicated per MAR, pt has immediate relief. O2 slight decreased from med administration to 88% at RA, placed pt on 3L NC and improved sat to 93%

## 2024-01-26 NOTE — CONSULTS
NEUROSURGERY CONSULT        PLAN:    Mr. Cabral's most recent imaging does not exhibit any concerning pathology that would require any type of surgical intervention.      We feel that it would be in his best interest to proceed with a conservative approach by pain management set forth by the Medical team here at [facility]. We do suggest that he not lift anything greater than 5-10 pounds and avoid excessive bending, twisting, and turning.     We did review the images together and we explained his condition and the recommended treatment. We will follow-up with his primary Neurosurgical team upon discharge.     We will sign off. Please page our on-call service for any questions or concerns.     It has been a pleasure meeting Lev Cabral. Thank you for having us be involved in his care.    ______________________________________________________________________    HPI:    Lev Cabral is a pleasant 68 year old male who presented to the [facility] Emergency Department  for consultation on cervical spine pain with left upper extremity pain. He is s/p posterior cervical laminoplasty at [facility] earlier this week. He was hospitalized from 01/23 to 01-24. [facility] was contact but refused return transfer.  He describes the symptoms as a constant, sharp, burning pain that initiates in the midline cervical spine region and radiates to his left shoulder. This pain is not accompanied with paresthesia, numbness or weakness. He does admit that his symptoms have improved since presentation and he would like to discharge home.   There are no bowel or bladder changes. No other concerns are voiced.    Past Medical History:   Diagnosis Date    Unspecified adjustment reaction        Past Surgical History:   Procedure Laterality Date    APPENDECTOMY      COLONOSCOPY  2009    Dr. Oliva    COLONOSCOPY N/A 2/9/2017    Procedure: COLONOSCOPY;  Surgeon: Amos Loving MD;  Location:  GI    ENT SURGERY      tonsils     "ORTHOPEDIC SURGERY  1999    neck fusion- 3 level    ORTHOPEDIC SURGERY Left     left knee arthroscopic    REPAIR CHEST WALL N/A 12/1/2017    Procedure: REPAIR CHEST WALL;  RESECTION OF LEFT ANTERIOR CHEST WALL MASS AND RECONSTRUCTION WITH GOR-TIM SOFT TISSUE PATCH, PRIMARY REPAIR OF DIAPHRAGM;  Surgeon: Faizan Sterling MD;  Location: [facility] OR       No Known Allergies    Social History     Tobacco Use    Smoking status: Never     Passive exposure: Never    Smokeless tobacco: Never   Substance Use Topics    Alcohol use: No     Alcohol/week: 0.0 standard drinks of alcohol       Family History   Problem Relation Age of Onset    Cancer Father         brain tumor    Diabetes Father     Diabetes Mother         Type 2    Colon Cancer No family hx of        Scheduled Medications     acetaminophen  975 mg Oral TID    diclofenac  4 g Topical 4x Daily    lidocaine  2 patch Transdermal Q24H    menthol   Transdermal Q8H       Home Medications    diazepam (VALIUM) 5 MG tablet  HYDROmorphone (DILAUDID) 2 MG tablet  hydrochlorothiazide (HYDRODIURIL) 25 MG tablet  losartan (COZAAR) 50 MG tablet  omeprazole (PRILOSEC) 20 MG DR capsule  psyllium (METAMUCIL) 58.6 % POWD  sildenafil (REVATIO) 20 MG tablet  venlafaxine (EFFEXOR XR) 150 MG 24 hr capsule  vitamin B-12 (CYANOCOBALAMIN) 2500 MCG sublingual tablet  vitamin C (ASCORBIC ACID) 1000 MG TABS  VITAMIN D, CHOLECALCIFEROL, PO    PRN Medications    HYDROmorphone, HYDROmorphone, hydrOXYzine HCl **OR** hydrOXYzine HCl, melatonin, menthol **AND** menthol, methocarbamol, ondansetron **OR** ondansetron, oxyCODONE, oxyCODONE IR, polyethylene glycol, senna-docusate **OR** senna-docusate    ROS: 10 point ROS neg other than the symptoms noted above in the HPI.    Vitals:    /72   Pulse 57   Temp 97.5  F (36.4  C) (Oral)   Resp 18   Ht 1.803 m (5' 11\")   Wt 102.1 kg (225 lb)   SpO2 97%   BMI 31.38 kg/m    Body mass index is 31.38 kg/m .  No intake/output data " recorded.    Exam:    Patient appears comfortable, conversational, and in no apparent distress.   Head: Normocephalic, without obvious abnormality, atraumatic, no facial asymmetry.   Eyes: conjunctivae/corneas clear. PERRL, EOM's intact.   Throat: lips, mucosa, and tongue normal; teeth and gums normal.   Neck: supple, symmetrical, trachea midline, no adenopathy and thyroid: not enlarged, symmetric, no tenderness/mass/nodules.   Lungs: clear to auscultation bilaterally.   Heart: regular rate and rhythm.   Abdomen: soft, non-tender; bowel sounds normal; no masses, no organomegaly.   Pulses: 2+ and symmetric.   Skin: Skin color, texture, turgor normal. No rashes or lesions.     CN II-XII grossly intact, alert and appropriate with conversation and following commands.   Gait is non-antalgic. Able to tandem walk. Able to walk on toes and heels without difficulty.   Cervical spine is non tender to palpation. Appropriate range of motion of neck, not concerning for lhermitte's phenomenon.   Bilateral bicep 2/4 and tricep reflexes 1/4. Sensation intact throughout upper extremities.     UE muscle strength  Right  Left    Deltoid  5/5  5/5    Biceps  5/5  5/5    Triceps  5/5  5/5    Hand intrinsics  5/5  5/5    Hand grasp  5/5  5/5    Bui signs  neg  neg      Lumbar spine is non tender to palpation   Intact sensation throughout lower extremities.   Bilateral patellar 2/4 and achilles reflex 1/4.     LE muscle strength  Right  Left    Iliopsoas (hip flexion)  5/5  5/5    Quad (knee extension)  5/5  5/5    Hamstring (knee flexion)  5/5  5/5    Gastrocnemius (PF)  5/5  5/5    Tibialis Ant. (DF)  5/5  5/5    EHL  5/5  5/5      Negative Babinski bilaterally. Negative for clonus.   Incision and bandage CDI.   Calves are soft and non-tender bilaterally.     ECG/Telemetry:  Heart rate 67 bpm ID interval 194 ms  ms QT over QTc 422/445 consistent with sinus rhythm no ST or T wave abnormalities.     Imaging:  Impression per  radiology read - I have personally reviewed the images with the patient.    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 Spinal canal stenosis most severe at the C5-6 level with associated myelomalacia. Moderate to marked spinal canal encroachment at the C3 and C4 levels. Multilevel neural foraminal encroachment as described. Postoperative changes C5-C7 anterior fusion.    Narrative    EXAM: MR CERVICAL SPINE WITHOUT IV CONTRAST    COMPARISON: MRI of the cervical spine 12/08/2022, CT of the cervical spine 01/23/2024 and plain films of the cervical spine 01/23/2024    FINDINGS: Stable MRI examination. Postoperative changes C3-C7 anterior interbody fusion with plate and screw fixation. The screws at the C5 level are fractured. This appearance is unchanged compared with 05/04/2023 plain films.    Multilevel spinal canal stenosis and neural foraminal encroachment superimposed on congenital narrowing of the spinal canal is unchanged compared to the December 2022 exam. There are prominent areas of myelomalacia within the spinal cord at the C5-6  level which appear similar to that demonstrated on the December 2022 exam.    C2-3: Mild posterior endplate ridging without significant mass effect upon the spinal cord. Facet arthropathy combines with Luschka joint hypertrophy to result in moderate bilateral neural foraminal encroachment.    C3-4: Moderate to marked posterior endplate ridging results in complete effacement of the subarachnoid space and moderate mass effect upon the ventral aspect of the spinal cord. There is severe bilateral neural foraminal encroachment secondary to  spondylosis. Fusion of the right C3-4 facet joints.    C4-5: Posterior endplate ridging results in complete effacement of the ventral subarachnoid space and mild mass effect upon the ventral aspect of the spinal cord. Luschka joint hypertrophy and facet arthropathy combine to result in  "severe right and  moderate severe left neural foraminal encroachment.    C5-6: Prominent posterior endplate ridging results in complete effacement of the subarachnoid space and moderate to marked mass effect upon the spinal cord. Intramedullary signal abnormality as previously noted at the C5 level. Severe bilateral neural  foraminal encroachment secondary spondylosis at C5-6.    C6-7: Mild posterior endplate ridging results in minimal encroachment (right greater than left) upon the spinal canal. There is no mass effect upon the spinal cord. Moderate bilateral neural foraminal encroachment secondary spondylosis at C6-7.    C7-T1: Mild to moderate posterior endplate ridging C7 interspace results in mild encroachment upon the ventral aspect of the spinal canal. Luschka joint hypertrophy and facet arthropathy result in moderate to severe bilateral neural foraminal  encroachment.    Available labs at time of consult:       Recent Labs   Lab 01/25/24  2253   WBC 14.7*   HGB 15.1   HCT 43.2   MCV 89        Recent Labs   Lab 01/25/24  2253   WBC 14.7*   HGB 15.1   HCT 43.2   MCV 89        No results for input(s): \"SED\", \"CRP\" in the last 168 hours.  Recent Labs   Lab 01/25/24  2253   HGB 15.1     No results for input(s): \"INR\" in the last 168 hours.  Recent Labs   Lab 01/25/24  2253        Recent Labs   Lab 01/25/24  2253   WBC 14.7*         Respectfully,    Jabari Wang MPAS, PA-C  [facility]  [facility]     Tel: [phone]      All imaging, physical findings, and the above plan have been reviewed with Dr. Mathews.    "

## 2024-01-26 NOTE — H&P
"[facility]  Hospitalist H&P    Name: Lev Cabral      MRN: [id number]  YOB: 1955    Age: 68 year old  Date of admission: 1/25/2024  Primary care provider: Cr Corley            Assessment and Plan:     Lev Cabral is a 68 year old male with a history of hypertension and spinal stenosis with a cervical C3-C6 laminoplasty on 1/24 who presents with uncontrolled postoperative pain and spasms.    Problem list:  Postoperative cervical laminoplasty pain: He is neurologically and vascularly intact.  His incision appears to be healing well without signs of infection.  His main complaint is actually muscle spasms.  Pain management was ineffective in the emergency department so he will be admitted to observation.  I will continue multiple analgesics.  Will request PT consultation.  I would like to also request neurosurgery consultation to ensure that his postoperative course is proceeding as anticipated without complications.  Hypokalemia: Start the potassium replacement protocol.  Potassium is 3.2.  Hyponatremia: Sodium is only 129.  Push oral intake.  Hypertension: Blood pressure is reasonable.  Continue prior to admission losartan and hydrochlorothiazide that were recently prescribed.    Clinically Significant Risk Factors Present on Admission        # Hypokalemia: Lowest K = 3.2 mmol/L in last 2 days, will replace as needed  # Hyperkalemia: Highest K = 8.7 mmol/L in last 2 days, will monitor as appropriate  # Hyponatremia: Lowest Na = 129 mmol/L in last 2 days, will monitor as appropriate              # Hypertension: Home medication list includes antihypertensive(s)        # Obesity: Estimated body mass index is 32.13 kg/m  as calculated from the following:    Height as of 1/8/24: 1.816 m (5' 11.5\").    Weight as of 1/8/24: 106 kg (233 lb 9.6 oz).              Code status: Full.  Admit to observation status.  Prophylaxis: PCD's.  Disposition: Home in 1 to 2 " days.    80 MINUTES SPENT BY ME on the date of service doing chart review, history, exam, documentation & further activities per the note.          Chief Complaint:     Neck pain.         History of Present Illness:   Lev Cabral is a 68 year old male who presents with neck pain.  History was obtained from my discussion with the patient and his spouse at the bedside.  I also discussed the case with the ED provider.  The electronic medical record was also reviewed.    The patient underwent a laminoplasty at [facility] on Wednesday afternoon.  He arrived to the floor from the PACU at about 9 PM.  He took an oxycodone at night.  The next morning he did some basic activity and his pain was controlled.  He was able to discharge home at about 3 PM.  He had a 1 hour car ride back to his home and his condition slowly worsened.  He was having fairly intractable and severe spasms of his shoulders.  He admits to some degree of neck pain at the site of the incision but the primary complaint is periodic but fairly frequent severe spasms.  He attempted to manage with acetaminophen, diazepam, and oxycodone but his symptoms remain uncontrolled so he comes back to the hospital.    Multiple symptomatic medications including Decadron, diazepam, Dilaudid, Toradol, and Zofran were given in the emergency department.  Ambulation was attempted but his symptoms were uncontrolled and thought to require hospital admission.  [facility] was contacted but they are on divert.  The ER doctor did discuss with his orthopedist.  Due to lack of bed availability he would be admitted here for pain management.            Past Medical History:     Past Medical History:   Diagnosis Date    Unspecified adjustment reaction              Past Surgical History:     Past Surgical History:   Procedure Laterality Date    APPENDECTOMY      COLONOSCOPY  2009    Dr. Oliva    COLONOSCOPY N/A 2/9/2017    Procedure: COLONOSCOPY;  Surgeon: Amos Loving MD;   Location:  GI    ENT SURGERY      tonsils    ORTHOPEDIC SURGERY  1999    neck fusion- 3 level    ORTHOPEDIC SURGERY Left     left knee arthroscopic    REPAIR CHEST WALL N/A 12/1/2017    Procedure: REPAIR CHEST WALL;  RESECTION OF LEFT ANTERIOR CHEST WALL MASS AND RECONSTRUCTION WITH GOR-TIM SOFT TISSUE PATCH, PRIMARY REPAIR OF DIAPHRAGM;  Surgeon: Faizan Sterling MD;  Location:  OR             Social History:     Social History     Tobacco Use    Smoking status: Never     Passive exposure: Never    Smokeless tobacco: Never   Substance Use Topics    Alcohol use: No     Alcohol/week: 0.0 standard drinks of alcohol             Family History:   The family history was fully reviewed and non-contributory in this case.         Allergies:   No Known Allergies          Medications:     Prior to Admission medications    Medication Sig Last Dose Taking? Auth Provider Long Term End Date   diazepam (VALIUM) 5 MG tablet Take 0.5 tablets (2.5 mg) by mouth every 6 hours as needed for muscle spasms (MUSCLE SPASM)  Yes Yadiel Snyder MD  2/2/24   HYDROmorphone (DILAUDID) 2 MG tablet Take 1 tablet (2 mg) by mouth every 6 hours as needed for pain  Yes Yadiel Snyder MD  1/29/24   hydrochlorothiazide (HYDRODIURIL) 25 MG tablet Take 1 tablet (25 mg) by mouth daily   Cr Corley PA-C Yes    losartan (COZAAR) 50 MG tablet Take 1 tablet (50 mg) by mouth daily   Cr Corley PA-C Yes    omeprazole (PRILOSEC) 20 MG DR capsule TAKE ONE CAPSULE BY MOUTH TWICE DAILY FOR 2 MONTHS.   Reported, Patient No    psyllium (METAMUCIL) 58.6 % POWD Take 2 Tablespoonful by mouth daily   Reported, Patient     sildenafil (REVATIO) 20 MG tablet TAKE TWO TO FIVE TABLETS BY MOUTH 30 MINUTES TO 4 HOURS BEFORE SEX.   Cr Corley PA-C Yes    venlafaxine (EFFEXOR XR) 150 MG 24 hr capsule TAKE 1 CAPSULE BY MOUTH EVERY DAY   Cr Corley PA-C Yes    vitamin B-12 (CYANOCOBALAMIN) 2500 MCG  "sublingual tablet Take 1,000 mcg by mouth daily   Reported, Patient     vitamin C (ASCORBIC ACID) 1000 MG TABS Take 1,000 mg by mouth daily   Reported, Patient     VITAMIN D, CHOLECALCIFEROL, PO Take 1,000 Units by mouth daily   Reported, Patient               Review of Systems:     A Comprehensive greater than 10 system review of systems was carried out.  Pertinent positives and negatives are noted above.  Otherwise negative for contributory information.           Physical Exam:   Blood pressure 130/81, pulse 64, temperature 97.1  F (36.2  C), temperature source Temporal, resp. rate 16, SpO2 99%.  Wt Readings from Last 1 Encounters:   01/08/24 106 kg (233 lb 9.6 oz)     Exam:  GENERAL: No apparent distress. Awake, alert, and fully oriented.  HEENT: Normocephalic, atraumatic. Extraocular movements intact.  CARDIOVASCULAR: Regular rate and rhythm without murmurs or rubs. No S3.  PULMONARY: Clear to auscultation bilaterally.  ABDOMINAL: Soft, non-tender, non-distended. Bowel sounds normoactive.   EXTREMITIES: No cyanosis or clubbing. No appreciable edema.  NEUROLOGICAL: CN 2-12 grossly intact, no focal neurological deficits.  DERMATOLOGICAL: No rash, ulcer, bruising, nor jaundice.          Data:   EKG:  Personally reviewed.     Laboratory:  Recent Labs   Lab 01/25/24  2253   WBC 14.7*   HGB 15.1   HCT 43.2   MCV 89        Recent Labs   Lab 01/25/24  2347 01/25/24  2303 01/25/24  2253   NA  --   --  129*   POTASSIUM 3.2*  --  8.7*   CHLORIDE  --   --  94*   CO2  --   --  27   ANIONGAP  --   --  8   GLC  --  177* 152*   BUN  --   --  18.7   CR  --   --  0.80   GFRESTIMATED  --   --  >90   JENNIFER  --   --  8.8     No results for input(s): \"CULT\" in the last 168 hours.    Imaging:  Recent Results (from the past 24 hour(s))   DX Cervical Spine 2-3 Views    Narrative    EXAM:  DX CERVICAL SPINE 2-3 VIEWS    Impression    Postoperative changes in the cervical spine. Soft tissue drain. No hardware failure.       Zheng " DO Ananth MPH  [facility] Hospitalist  [address] [place] [address]  01/26/2024

## 2024-01-26 NOTE — ED NOTES
This RN went to discharge pt as pt's pain has significant improvement from medication regimen, pt has been resting comfortable in the reclining chair. Pt was getting dress and stood up from the recliner, started to have increase muscle tenderness and spasm to the shoulder which was similar to why he was brought here initially. Pt is unable to stand up again, became restless and leaning forward.

## 2024-01-26 NOTE — UTILIZATION REVIEW
"  Admission Status; Secondary Review Determination         Under the authority of the Utilization Management Committee, the utilization review process indicated a secondary review on the above patient.  The review outcome is based on review of the medical records, discussions with staff, and applying clinical experience noted on the date of the review.          (x) Observation Status Appropriate - This patient does not meet hospital inpatient criteria and is placed in observation status. If this patient's primary payer is Medicare and was admitted as an inpatient, Condition Code 44 should be used and patient status changed to \"observation\".     RATIONALE FOR DETERMINATION           Lev Cabral is a 68 year old male with history of hypertension and spinal stenosis with a cervical C3-C6 laminoplasty on 1/24 who presented with uncontrolled postoperative pain and spasms. No post op problem found. Patient was admitted with post op pain. Observation care is appropriate for management of post op pain without surgical complication.       The severity of illness, intensity of service provided, expected LOS and risk for adverse outcome make the care appropriate for further observation; however, doesn't meet criteria for hospital inpatient admission. Dr Kevin Awad was notified of this determination.    This document was produced using voice recognition software.      The information on this document is developed by the utilization review team in order for the business office to ensure compliance.  This only denotes the appropriateness of proper admission status and does not reflect the quality of care rendered.         The definitions of Inpatient Status and Observation Status used in making the determination above are those provided in the CMS Coverage Manual, Chapter 1 and Chapter 6, section 70.4.      Sincerely,    Cr Hu MD    Utilization Review  Physician Advisor  [facility].    "

## 2024-01-26 NOTE — PROGRESS NOTES
01/26/24 1600   Appointment Info   Signing Clinician's Name / Credentials (PT) Ivone Murray DPT   Rehab Comments (PT) spinal precautions   Quick Adds   Quick Adds Certification   Living Environment   People in Home spouse   Current Living Arrangements house   Home Accessibility no concerns   Living Environment Comments Has FWW to use, walk in shower and spouse getting shower chair from her mom to use at home; pt had fall in bathroom previously   Self-Care   Usual Activity Tolerance good   Current Activity Tolerance good   Equipment Currently Used at Home none   Fall history within last six months yes   Number of times patient has fallen within last six months 4   Activity/Exercise/Self-Care Comment Pt normally independent with all ADLs/IADLs, spouse available to assist.   General Information   Onset of Illness/Injury or Date of Surgery 01/25/24   Referring Physician Zheng Wadsworth,    Patient/Family Therapy Goals Statement (PT) return home, continue better pain management and get rid of muscle spasms   Pertinent History of Current Problem (include personal factors and/or comorbidities that impact the POC) 68 year old male with history of hypertension and spinal stenosis with a cervical C3-C6 laminoplasty on 1/24 who presented with uncontrolled postoperative pain and spasms. No post op problem found. Patient was admitted with post op pain.   Existing Precautions/Restrictions spinal   Weight-Bearing Status - LUE full weight-bearing   Weight-Bearing Status - RUE full weight-bearing   Weight-Bearing Status - LLE full weight-bearing   Weight-Bearing Status - RLE full weight-bearing   Cognition   Affect/Mental Status (Cognition) WFL   Orientation Status (Cognition) oriented x 4   Follows Commands (Cognition) WFL   Pain Assessment   Patient Currently in Pain Yes, see Vital Sign flowsheet  (currently 1-2/10 pain with good pain management per pt report)   Integumentary/Edema   Integumentary/Edema Comments posterior  incision from cspine surgery, soft collar for comfort   Posture    Posture Forward head position   Range of Motion (ROM)   Range of Motion ROM deficits secondary to surgical procedure   Strength (Manual Muscle Testing)   Strength (Manual Muscle Testing) No deficits observed during functional mobility   Transfers   Comment, (Transfers) CGA with FWW   Gait/Stairs (Locomotion)   [place] Level (Gait) contact guard   Assistive Device (Gait) walker, front-wheeled   Distance in Feet (Gait) 100'   Balance   Balance Comments requires SBA and use of FWW give mild balance impairment without device   Sensory Examination   Sensory Perception patient reports no sensory changes   Clinical Impression   Criteria for Skilled Therapeutic Intervention Yes, treatment indicated   PT Diagnosis (PT) impaired mobility   Influenced by the following impairments pain, impaired balance   Functional limitations due to impairments fall risk   Clinical Presentation (PT Evaluation Complexity) stable   Clinical Presentation Rationale clinical judgement   Clinical Decision Making (Complexity) low complexity   Planned Therapy Interventions (PT) balance training;bed mobility training;gait training;neuromuscular re-education;strengthening;stair training;patient/family education;transfer training   Risk & Benefits of therapy have been explained evaluation/treatment results reviewed;risks/benefits reviewed;care plan/treatment goals reviewed;current/potential barriers reviewed;participants voiced agreement with care plan;patient;participants included   PT Total Evaluation Time   PT Eval, Low Complexity Minutes (60602) 15   Therapy Certification   Start of care date 01/26/24   Certification date from 01/26/24   Certification date to 01/31/24   Medical Diagnosis acute post operative pain   Physical Therapy Goals   PT Frequency Daily   PT Predicted Duration/Target Date for Goal Attainment 01/29/24   PT Goals Gait;Stairs;Transfers;Bed Mobility   PT: Bed  Mobility Independent;Within precautions   PT: Transfers Modified independent;Sit to/from stand;Bed to/from chair;Assistive device   PT: Gait Modified independent;Rolling walker;Greater than 200 feet   PT: Stairs Modified independent;4 stairs;Rail on both sides   Interventions   Interventions Quick Adds Therapeutic Activity   Therapeutic Activity   Therapeutic Activities: dynamic activities to improve functional performance Minutes (05607) 15   Symptoms Noted During/After Treatment None   Treatment Detail/Skilled Intervention Hallway ambulation x 500 feet with CGA progressing to SBA with FWW; pt needs reminders for maintaining cervical precautions at times with increased cervical rotation and flex/ext, improved upright posture with cuing. Edu on walking with nsg at least 4x/day and positions such as UEs supported and neutral neck for additonal pain management. Cued for gentle shoulder rolls, scapular retractions in chair to encourage blood flow as well as decreased muscle spasms.   PT Discharge Planning   PT Plan progress hallway ambulation, stairs (may use 4 steps up to kitchen level at home)   PT Discharge Recommendation (DC Rec) home with assist   PT Rationale for DC Rec Pt's pain better controlled at time of PT eval, currently ambulating SBA with need for FWW d/t balance impairments. Has FWW and spouse assist at home. Pending ongoing pain management, pt likely able to return home once medically cleared. Encourage hallway ambulation with nsg.   PT Brief overview of current status CGA/SBA with FWW   PT Equipment Needed at Discharge   (pt was sent home with FWW from [place] after cervical surgery 2 days ago)   Total Session Time   Timed Code Treatment Minutes 15   Total Session Time (sum of timed and untimed services) 30   [facility]  OUTPATIENT PHYSICAL THERAPY EVALUATION  PLAN OF TREATMENT FOR OUTPATIENT REHABILITATION  (COMPLETE FOR INITIAL CLAIMS ONLY)  Patient's Last Name, First  Name, M.I.  YOB: 1955  Lev Cabral                        Provider's Name  [facility] Medical Record No.  [id number]                             Onset Date:  01/25/24   Start of Care Date:  01/26/24   Type:     _X_PT   ___OT   ___SLP Medical Diagnosis:  acute post operative pain              PT Diagnosis:  impaired mobility Visits from SOC:  1     See note for plan of treatment, functional goals and certification details    I CERTIFY THE NEED FOR THESE SERVICES FURNISHED UNDER        THIS PLAN OF TREATMENT AND WHILE UNDER MY CARE     (Physician co-signature of this document indicates review and certification of the therapy plan).

## 2024-01-26 NOTE — PHARMACY-ADMISSION MEDICATION HISTORY
Pharmacist Admission Medication History    Admission medication history is complete. The information provided in this note is only as accurate as the sources available at the time of the update.    Information Source(s): Patient and CareEverywhere/SureScripts via in-person    Pertinent Information:     Changes made to PTA medication list:  Added: Tylenol, Oxycodone, Diazepam & Senna-S  Deleted: None  Changed: Omeprazole 20mg bid-->daily    Allergies reviewed with patient and updates made in EHR: yes    Medication History Completed By: Flori Rojas PharmD 1/26/2024 10:38 AM    PTA Med List   Medication Sig Last Dose    acetaminophen (TYLENOL) 500 MG tablet Take 1,000 mg by mouth 4 times daily 1/25/2024 at pm    diazepam (VALIUM) 5 MG tablet Take 0.5 tablets (2.5 mg) by mouth every 6 hours as needed for muscle spasms (MUSCLE SPASM)     diazepam (VALIUM) 5 MG tablet Take 5 mg by mouth 3 times daily as needed for anxiety prn    hydrochlorothiazide (HYDRODIURIL) 25 MG tablet Take 1 tablet (25 mg) by mouth daily 1/23/2024 at am    HYDROmorphone (DILAUDID) 2 MG tablet Take 1 tablet (2 mg) by mouth every 6 hours as needed for pain     losartan (COZAAR) 50 MG tablet Take 1 tablet (50 mg) by mouth daily 1/25/2024    omeprazole (PRILOSEC) 20 MG DR capsule Take 20 mg by mouth daily 1/25/2024    oxyCODONE (ROXICODONE) 5 MG tablet Take 5 mg by mouth every 4 hours as needed for severe pain 1/25/2024 at pm    psyllium (METAMUCIL) 58.6 % POWD Take 2 Tablespoonful by mouth daily 1/25/2024    senna-docusate (SENOKOT-S/PERICOLACE) 8.6-50 MG tablet Take 1 tablet by mouth daily 1/25/2024 at pm    sildenafil (REVATIO) 20 MG tablet TAKE TWO TO FIVE TABLETS BY MOUTH 30 MINUTES TO 4 HOURS BEFORE SEX. prn    venlafaxine (EFFEXOR XR) 150 MG 24 hr capsule TAKE 1 CAPSULE BY MOUTH EVERY DAY 1/25/2024    vitamin B-12 (CYANOCOBALAMIN) 2500 MCG sublingual tablet Take 1,000 mcg by mouth daily 1/25/2024    vitamin C (ASCORBIC ACID) 1000 MG TABS Take  1,000 mg by mouth daily 1/25/2024    VITAMIN D, CHOLECALCIFEROL, PO Take 1,000 Units by mouth daily 1/25/2024

## 2024-01-26 NOTE — ED NOTES
[facility]  ED Nurse Handoff Report    ED Chief complaint: Neck Pain and Arm Pain  . ED Diagnosis:   Final diagnoses:   Acute post-operative pain       Allergies: No Known Allergies    Code Status: Full Code    Activity level - Baseline/Home:  independent.  Activity Level - Current:   independent.   Lift room needed: No.   Bariatric: No   Needed: No   Isolation: No.   Infection: Not Applicable.     Respiratory status: Room air - need O2 sometime when given valium     Vital Signs (within 30 minutes):   Vitals:    01/26/24 0000 01/26/24 0015 01/26/24 0030 01/26/24 0200   BP: 124/82  130/81 104/67   Pulse: 61  64 61   Resp:       Temp:       TempSrc:       SpO2: 97% 98% 99% 90%       Cardiac Rhythm:  ,      Pain level:    Patient confused: No.   Patient Falls Risk: activity supervised and toileting schedule implemented.   Elimination Status: Unable to void     Patient Report - Initial Complaint: Muscle spasm .   Focused Assessment:     [facility]   Lev Cabral is a 68 year old male who presents with severe neck and left-sided shoulder pain.  Patient is a 68-year-old male with a recent complicated medical history that includes laminectomy of the cervical spine.  This was performed at [facility] after MRI suggested cord abnormality singles and severe stenosis.  Patient was in the hospital from the 23rd to the 24th.  Was discharged today.  Was sent home and developed severe pain in his neck.  Patient called the clinic and was told to take 10 mg of oxycodone which she did.  Pain was severe and presents to the emergency room for assessment.  No weakness in the arm.  No chest pain or shortness of breath.  No fever.        Abnormal Results:   Labs Ordered and Resulted from Time of ED Arrival to Time of ED Departure   BASIC METABOLIC PANEL - Abnormal       Result Value    Sodium 129 (*)     Potassium 8.7 (*)     Chloride 94 (*)     Carbon Dioxide (CO2) 27      Anion Gap 8      Urea Nitrogen 18.7       Creatinine 0.80      GFR Estimate >90      Calcium 8.8      Glucose 152 (*)    CBC WITH PLATELETS AND DIFFERENTIAL - Abnormal    WBC Count 14.7 (*)     RBC Count 4.88      Hemoglobin 15.1      Hematocrit 43.2      MCV 89      MCH 30.9      MCHC 35.0      RDW 12.7      Platelet Count 176      % Neutrophils 80      % Lymphocytes 10      % Monocytes 10      % Eosinophils 0      % Basophils 0      % Immature Granulocytes 0      NRBCs per 100 WBC 0      Absolute Neutrophils 11.7 (*)     Absolute Lymphocytes 1.4      Absolute Monocytes 1.5 (*)     Absolute Eosinophils 0.0      Absolute Basophils 0.0      Absolute Immature Granulocytes 0.1      Absolute NRBCs 0.0     GLUCOSE BY METER - Abnormal    GLUCOSE BY METER POCT 177 (*)    POTASSIUM - Abnormal    Potassium 3.2 (*)         No orders to display       Treatments provided: See MAR   Family Comments: wife   OBS brochure/video discussed/provided to patient:  N/A  ED Medications:   Medications   oxyCODONE IR (ROXICODONE) half-tab 2.5 mg (has no administration in time range)   oxyCODONE (ROXICODONE) tablet 5 mg (has no administration in time range)   HYDROmorphone (DILAUDID) injection 0.2 mg (has no administration in time range)   HYDROmorphone (DILAUDID) injection 0.4 mg (has no administration in time range)   melatonin tablet 3 mg (has no administration in time range)   senna-docusate (SENOKOT-S/PERICOLACE) 8.6-50 MG per tablet 1 tablet (has no administration in time range)     Or   senna-docusate (SENOKOT-S/PERICOLACE) 8.6-50 MG per tablet 2 tablet (has no administration in time range)   polyethylene glycol (MIRALAX) Packet 17 g (has no administration in time range)   ondansetron (ZOFRAN ODT) ODT tab 4 mg (has no administration in time range)     Or   ondansetron (ZOFRAN) injection 4 mg (has no administration in time range)   acetaminophen (TYLENOL) tablet 975 mg (has no administration in time range)   diclofenac (VOLTAREN) 1 % topical gel 4 g (has no administration in  time range)   Lidocaine (LIDOCARE) 4 % Patch 2 patch (has no administration in time range)   hydrOXYzine HCl (ATARAX) tablet 25 mg (has no administration in time range)     Or   hydrOXYzine HCl (ATARAX) tablet 50 mg (has no administration in time range)   methocarbamol (ROBAXIN) tablet 500 mg (has no administration in time range)   menthol (ICY HOT) 5 % patch 1 patch (has no administration in time range)     And   menthol (ICY HOY) Patch in Place (has no administration in time range)   HYDROmorphone (PF) (DILAUDID) injection 0.5 mg (0.5 mg Intravenous $Given 1/25/24 2249)   ondansetron (ZOFRAN) injection 4 mg (4 mg Intravenous $Given 1/25/24 2250)   diazepam (VALIUM) injection 2.5 mg (2.5 mg Intravenous $Given 1/25/24 2305)   HYDROmorphone (PF) (DILAUDID) injection 0.5 mg (0.5 mg Intravenous $Given 1/25/24 2305)   diazepam (VALIUM) injection 5 mg (5 mg Intravenous $Given 1/26/24 0144)   ketorolac (TORADOL) injection 15 mg (15 mg Intravenous $Given 1/26/24 0145)   dexAMETHasone (DECADRON) injection 4 mg (4 mg Intravenous $Given 1/26/24 0144)       Drips infusing:  No  For the majority of the shift this patient was Green.   Interventions performed were Pain control .    Sepsis treatment initiated: No    Cares/treatment/interventions/medications to be completed following ED care: in the recliner as it is more comfortable for him     ED Nurse Name: Bobby Scanlon RN  2:06 AM   RECEIVING UNIT ED HANDOFF REVIEW    Above ED Nurse Handoff Report was reviewed: Yes  Reviewed by: Bridget Don RN on January 26, 2024 at 2:22 PM

## 2024-01-27 ENCOUNTER — APPOINTMENT (OUTPATIENT)
Dept: PHYSICAL THERAPY | Facility: CLINIC | Age: 69
DRG: 947 | End: 2024-01-27
Payer: MEDICARE

## 2024-01-27 LAB
HOLD SPECIMEN: NORMAL
POTASSIUM SERPL-SCNC: 3.4 MMOL/L (ref 3.4–5.3)
POTASSIUM SERPL-SCNC: 3.4 MMOL/L (ref 3.4–5.3)

## 2024-01-27 PROCEDURE — 99232 SBSQ HOSP IP/OBS MODERATE 35: CPT | Performed by: INTERNAL MEDICINE

## 2024-01-27 PROCEDURE — 250N000011 HC RX IP 250 OP 636: Performed by: HOSPITALIST

## 2024-01-27 PROCEDURE — G0378 HOSPITAL OBSERVATION PER HR: HCPCS

## 2024-01-27 PROCEDURE — 250N000013 HC RX MED GY IP 250 OP 250 PS 637: Performed by: INTERNAL MEDICINE

## 2024-01-27 PROCEDURE — 120N000001 HC R&B MED SURG/OB

## 2024-01-27 PROCEDURE — 36415 COLL VENOUS BLD VENIPUNCTURE: CPT | Performed by: HOSPITALIST

## 2024-01-27 PROCEDURE — 97530 THERAPEUTIC ACTIVITIES: CPT | Mod: GP | Performed by: PHYSICAL THERAPIST

## 2024-01-27 PROCEDURE — 84132 ASSAY OF SERUM POTASSIUM: CPT | Performed by: HOSPITALIST

## 2024-01-27 PROCEDURE — 250N000013 HC RX MED GY IP 250 OP 250 PS 637: Performed by: HOSPITALIST

## 2024-01-27 PROCEDURE — 36415 COLL VENOUS BLD VENIPUNCTURE: CPT | Performed by: INTERNAL MEDICINE

## 2024-01-27 PROCEDURE — 84132 ASSAY OF SERUM POTASSIUM: CPT | Performed by: INTERNAL MEDICINE

## 2024-01-27 RX ORDER — PANTOPRAZOLE SODIUM 40 MG/1
40 TABLET, DELAYED RELEASE ORAL DAILY
Status: DISCONTINUED | OUTPATIENT
Start: 2024-01-27 | End: 2024-02-04 | Stop reason: HOSPADM

## 2024-01-27 RX ORDER — LANOLIN ALCOHOL/MO/W.PET/CERES
1000 CREAM (GRAM) TOPICAL DAILY
Status: DISCONTINUED | OUTPATIENT
Start: 2024-01-27 | End: 2024-02-04 | Stop reason: HOSPADM

## 2024-01-27 RX ORDER — HYDROMORPHONE HYDROCHLORIDE 1 MG/ML
0.4 INJECTION, SOLUTION INTRAMUSCULAR; INTRAVENOUS; SUBCUTANEOUS
Status: DISCONTINUED | OUTPATIENT
Start: 2024-01-27 | End: 2024-01-29

## 2024-01-27 RX ORDER — VITAMIN B COMPLEX
25 TABLET ORAL DAILY
Status: DISCONTINUED | OUTPATIENT
Start: 2024-01-27 | End: 2024-02-04 | Stop reason: HOSPADM

## 2024-01-27 RX ORDER — VENLAFAXINE HYDROCHLORIDE 37.5 MG/1
150 CAPSULE, EXTENDED RELEASE ORAL DAILY
Status: DISCONTINUED | OUTPATIENT
Start: 2024-01-27 | End: 2024-02-04 | Stop reason: HOSPADM

## 2024-01-27 RX ORDER — AMOXICILLIN 250 MG
1 CAPSULE ORAL EVERY EVENING
Status: DISCONTINUED | OUTPATIENT
Start: 2024-01-27 | End: 2024-01-31

## 2024-01-27 RX ORDER — POTASSIUM CHLORIDE 1500 MG/1
40 TABLET, EXTENDED RELEASE ORAL ONCE
Status: COMPLETED | OUTPATIENT
Start: 2024-01-27 | End: 2024-01-27

## 2024-01-27 RX ORDER — LOSARTAN POTASSIUM 50 MG/1
50 TABLET ORAL DAILY
Status: DISCONTINUED | OUTPATIENT
Start: 2024-01-27 | End: 2024-02-04 | Stop reason: HOSPADM

## 2024-01-27 RX ORDER — HYDROCHLOROTHIAZIDE 25 MG/1
25 TABLET ORAL DAILY
Status: DISCONTINUED | OUTPATIENT
Start: 2024-01-27 | End: 2024-02-04 | Stop reason: HOSPADM

## 2024-01-27 RX ORDER — DIAZEPAM 5 MG
5 TABLET ORAL 3 TIMES DAILY PRN
Status: DISCONTINUED | OUTPATIENT
Start: 2024-01-27 | End: 2024-01-29

## 2024-01-27 RX ADMIN — POTASSIUM CHLORIDE 40 MEQ: 1500 TABLET, EXTENDED RELEASE ORAL at 18:52

## 2024-01-27 RX ADMIN — POTASSIUM CHLORIDE 40 MEQ: 1500 TABLET, EXTENDED RELEASE ORAL at 07:56

## 2024-01-27 RX ADMIN — VENLAFAXINE HYDROCHLORIDE 150 MG: 37.5 CAPSULE, EXTENDED RELEASE ORAL at 16:47

## 2024-01-27 RX ADMIN — ACETAMINOPHEN 975 MG: 325 TABLET, FILM COATED ORAL at 07:56

## 2024-01-27 RX ADMIN — METHOCARBAMOL 500 MG: 500 TABLET ORAL at 12:11

## 2024-01-27 RX ADMIN — OXYCODONE HYDROCHLORIDE 5 MG: 5 TABLET ORAL at 08:08

## 2024-01-27 RX ADMIN — ACETAMINOPHEN 975 MG: 325 TABLET, FILM COATED ORAL at 13:59

## 2024-01-27 RX ADMIN — HYDROXYZINE HYDROCHLORIDE 50 MG: 50 TABLET, FILM COATED ORAL at 23:03

## 2024-01-27 RX ADMIN — PSYLLIUM HUSK 2 PACKET: 3.4 POWDER ORAL at 16:49

## 2024-01-27 RX ADMIN — OXYCODONE HYDROCHLORIDE 5 MG: 5 TABLET ORAL at 23:03

## 2024-01-27 RX ADMIN — OXYCODONE HYDROCHLORIDE 5 MG: 5 TABLET ORAL at 12:12

## 2024-01-27 RX ADMIN — METHOCARBAMOL 500 MG: 500 TABLET ORAL at 02:40

## 2024-01-27 RX ADMIN — OXYCODONE HYDROCHLORIDE 5 MG: 5 TABLET ORAL at 03:11

## 2024-01-27 RX ADMIN — LIDOCAINE 2 PATCH: 4 PATCH TOPICAL at 07:57

## 2024-01-27 RX ADMIN — SENNOSIDES AND DOCUSATE SODIUM 1 TABLET: 8.6; 5 TABLET ORAL at 20:05

## 2024-01-27 RX ADMIN — DICLOFENAC SODIUM TOPICAL GEL, 1% 4 G: 10 GEL TOPICAL at 16:56

## 2024-01-27 RX ADMIN — HYDROXYZINE HYDROCHLORIDE 50 MG: 50 TABLET, FILM COATED ORAL at 02:40

## 2024-01-27 RX ADMIN — CYANOCOBALAMIN TAB 1000 MCG 1000 MCG: 1000 TAB at 16:48

## 2024-01-27 RX ADMIN — DICLOFENAC SODIUM TOPICAL GEL, 1% 4 G: 10 GEL TOPICAL at 08:04

## 2024-01-27 RX ADMIN — DICLOFENAC SODIUM TOPICAL GEL, 1% 4 G: 10 GEL TOPICAL at 12:11

## 2024-01-27 RX ADMIN — Medication 25 MCG: at 16:48

## 2024-01-27 RX ADMIN — DICLOFENAC SODIUM TOPICAL GEL, 1% 4 G: 10 GEL TOPICAL at 20:05

## 2024-01-27 RX ADMIN — METHOCARBAMOL 500 MG: 500 TABLET ORAL at 18:51

## 2024-01-27 RX ADMIN — OXYCODONE HYDROCHLORIDE 5 MG: 5 TABLET ORAL at 18:51

## 2024-01-27 RX ADMIN — HYDROXYZINE HYDROCHLORIDE 50 MG: 50 TABLET, FILM COATED ORAL at 13:59

## 2024-01-27 RX ADMIN — ACETAMINOPHEN 975 MG: 325 TABLET, FILM COATED ORAL at 20:05

## 2024-01-27 RX ADMIN — HYDROMORPHONE HYDROCHLORIDE 0.2 MG: 0.2 INJECTION, SOLUTION INTRAMUSCULAR; INTRAVENOUS; SUBCUTANEOUS at 09:33

## 2024-01-27 RX ADMIN — PANTOPRAZOLE SODIUM 40 MG: 40 TABLET, DELAYED RELEASE ORAL at 16:48

## 2024-01-27 ASSESSMENT — ACTIVITIES OF DAILY LIVING (ADL)
ADLS_ACUITY_SCORE: 27
ADLS_ACUITY_SCORE: 27
ADLS_ACUITY_SCORE: 28
ADLS_ACUITY_SCORE: 29
ADLS_ACUITY_SCORE: 28
ADLS_ACUITY_SCORE: 27
DEPENDENT_IADLS:: INDEPENDENT
ADLS_ACUITY_SCORE: 27

## 2024-01-27 NOTE — PLAN OF CARE
PRIMARY DIAGNOSIS: POST LAMINOPLASTY PAIN  OUTPATIENT/OBSERVATION GOALS TO BE MET BEFORE DISCHARGE:  1. Pain Status: Improved-controlled with oral pain medications.    2. Return to near baseline physical activity: Yes    3. Cleared for discharge by consultants (if involved): Yes    Discharge Planner Nurse   Safe discharge environment identified: Yes  Barriers to discharge: No       Entered by: Branden Dillon RN 01/27/2024 3:38 AM   Pt is A & O x 4, up sba, reg diet, Oxycodone, Robaxin, Atarax given for pain management & spasms with relief, dressing on posterior mid neck with scant drainage, K replacement, at rest rates pain @ 2 and 8 with movement. PT recs home with assist. Continue with POC. Pt bends forward when seated & in the bathroom, writer reminded Pt no excessive bending, turning or twisting per neurosurgery, Pt stated he gets better pain relief while leaning forward.    Please review provider order for any additional goals.   Nurse to notify provider when observation goals have been met and patient is ready for discharge.Goal Outcome Evaluation:

## 2024-01-27 NOTE — PLAN OF CARE
PRIMARY DIAGNOSIS: Acute Pain   OUTPATIENT/OBSERVATION GOALS TO BE MET BEFORE DISCHARGE:  ADLs back to baseline: Yes    Activity and level of assistance: Up with standby assistance.    Pain status: Improved but still requiring IV narcotics., reports severe pain with the slightest movements. IV Dilaudid, oxy x2, Robaxin, Tylenol, Voltaren gel and lidocaine patch applied     Return to near baseline physical activity: Yes     Discharge Planner Nurse   Safe discharge environment identified: Yes  Barriers to discharge: Yes. Severe pain requiring IV pain meds        Entered by: Makeda Hall RN 01/27/2024 12:18 PM     Please review provider order for any additional goals.   Nurse to notify provider when observation goals have been met and patient is ready for discharge.

## 2024-01-27 NOTE — PROGRESS NOTES
"[facility]    Hospitalist Progress Note  Provider : Kevin Awad MD, MD  Date of Service (when I saw the patient): 01/27/2024    Assessment & Plan   Lev Cabral is a 68 year old male with a history of hypertension and spinal stenosis with a cervical C3-C6 laminoplasty on 1/24 who presents with uncontrolled postoperative pain and spasms.     Problem list:  Postoperative cervical laminoplasty pain: He is neurologically and vascularly intact.  His incision appears to be healing well without signs of infection.  His main complaint is actually muscle spasms.  Pain management was ineffective in the emergency department so he will be admitted to observation.    -I will continue multiple analgesics.    -PT consultation.    -Neurosurgery saw the patient and recommended to continue with pain management and conservative approach.  No surgery planned at this time.  Neurosurgery signed off  -Continue multimodal pain management    Hypokalemia: Continue potassium replacement protocol.  Potassium is 3.4.  Hyponatremia: Improved with IV fluids  Hypertension: Blood pressure is reasonable.  Continue prior to admission losartan and hydrochlorothiazide     Clinically Significant Risk Factors Present on Admission         # Obesity: Estimated body mass index is 32.13 kg/m  as calculated from the following:    Height as of 1/8/24: 1.816 m (5' 11.5\").    Weight as of 1/8/24: 106 kg (233 lb 9.6 oz).            Code status: Full.  Admit to observation status.  Prophylaxis: PCD's.  Disposition: Home in 1 to 2 days.    DVT Prophylaxis: Pneumatic Compression Devices  Code Status: Full Code    Disposition: Expected discharge likely tomorrow if pain well-controlled    Kevin Awad MD    Interval History   Patient seen and examined today.  Still complaining of significant back pain.  Denies weakness of lower extremities.  No fever.  No nausea or vomiting.    -Data reviewed today: I reviewed all new labs " and imaging results over the last 24 hours. I personally reviewed no images or EKG's today.    Physical Exam   Temp: 98.7  F (37.1  C) Temp src: Temporal BP: 123/70 Pulse: 55   Resp: 18 SpO2: 98 % O2 Device: None (Room air)    Vitals:    01/26/24 0215   Weight: 102.1 kg (225 lb)     Vital Signs with Ranges  Temp:  [97  F (36.1  C)-98.7  F (37.1  C)] 98.7  F (37.1  C)  Pulse:  [47-66] 55  Resp:  [16-18] 18  BP: (105-139)/(67-80) 123/70  SpO2:  [95 %-98 %] 98 %  I/O last 3 completed shifts:  In: 545 [P.O.:545]  Out: -     GEN:  Alert, oriented x 3, appears comfortable, NAD.  HEENT:  Normocephalic/atraumatic, no scleral icterus, no nasal discharge, mouth moist.  CV:  Regular rate and rhythm, no murmur or JVD.  S1 + S2 noted, no S3 or S4.  LUNGS:  Clear to auscultation bilaterally without rales/rhonchi/wheezing/retractions.  Symmetric chest rise on inhalation noted.  ABD:  Active bowel sounds, soft, non-tender/non-distended.  No rebound/guarding/rigidity.  EXT:  No edema or cyanosis.  Hands/feet warm to touch with good signs of peripheral perfusion.  No joint synovitis noted.  SKIN:  Dry to touch, no exanthems noted in the visualized areas.  NEURO:  Symmetric muscle strength, sensation to touch grossly intact.  No new focal deficits appreciated.    Medications      acetaminophen  975 mg Oral TID    diclofenac  4 g Topical 4x Daily    lidocaine  2 patch Transdermal Q24H    menthol   Transdermal Q8H       Data   Recent Labs   Lab 01/27/24  0555 01/26/24  0948 01/26/24  0442 01/25/24  2347 01/25/24  2303 01/25/24  2253   WBC  --   --   --   --   --  14.7*   HGB  --   --   --   --   --  15.1   MCV  --   --   --   --   --  89   PLT  --   --   --   --   --  176   NA  --   --  135  --   --  129*   POTASSIUM 3.4 4.1 4.0   < >  --  8.7*   CHLORIDE  --   --  95*  --   --  94*   CO2  --   --  30*  --   --  27   BUN  --   --  23.1*  --   --  18.7   CR  --   --  0.95  --   --  0.80   ANIONGAP  --   --  10  --   --  8   JENNIFER  --   --   8.8  --   --  8.8   GLC  --   --  188*  --  177* 152*    < > = values in this interval not displayed.       No results found for this or any previous visit (from the past 24 hour(s)).

## 2024-01-27 NOTE — PLAN OF CARE
PRIMARY DIAGNOSIS: ACUTE PAIN  OUTPATIENT/OBSERVATION GOALS TO BE MET BEFORE DISCHARGE:  1. Pain Status: Improved-controlled with oral pain medications.    2. Return to near baseline physical activity: Yes    3. Cleared for discharge by consultants (if involved): Yes    Discharge Planner Nurse   Safe discharge environment identified: Yes  Barriers to discharge: No       Entered by: Branden Dillon RN 01/27/2024 3:37 AM     Please review provider order for any additional goals.   Nurse to notify provider when observation goals have been met and patient is ready for discharge.Goal Outcome Evaluation:

## 2024-01-27 NOTE — CONSULTS
Care Management Initial Consult    General Information  Assessment completed with: Patient, Spouse or significant other,    Type of CM/SW Visit: Initial Assessment    Primary Care Provider verified and updated as needed: Yes   Readmission within the last 30 days:        Reason for Consult: discharge planning  Advance Care Planning:            Communication Assessment  Patient's communication style: spoken language (English or Bilingual)    Hearing Difficulty or Deaf: no   Wear Glasses or Blind: yes    Cognitive  Cognitive/Neuro/Behavioral: WDL  Level of Consciousness: alert  Arousal Level: opens eyes spontaneously  Orientation: oriented x 4  Mood/Behavior: calm, cooperative  Best Language: 0 - No aphasia  Speech: clear, spontaneous    Living Environment:   People in home: spouse     Current living Arrangements: house      Able to return to prior arrangements: yes       Family/Social Support:  Care provided by:  self and spouse  Provides care for: no one  Marital Status:              Description of Support System: Supportive, Involved         Current Resources:   Patient receiving home care services: No     Community Resources:    Equipment currently used at home: walker, standard    Employment/Financial:  Employment Status: retired        Financial Concerns: none           Does the patient's insurance plan have a 3 day qualifying hospital stay waiver?  No    Lifestyle & Psychosocial Needs:  Social Determinants of Health     Food Insecurity: Low Risk  (1/1/2024)    Food Insecurity     Within the past 12 months, did you worry that your food would run out before you got money to buy more?: No     Within the past 12 months, did the food you bought just not last and you didn t have money to get more?: No   Depression: At risk (1/8/2024)    PHQ-2     PHQ-2 Score: 3   Housing Stability: Low Risk  (1/1/2024)    Housing Stability     Do you have housing? : Yes     Are you worried about losing your housing?: No    Tobacco Use: Low Risk  (1/8/2024)    Patient History     Smoking Tobacco Use: Never     Smokeless Tobacco Use: Never     Passive Exposure: Never   Financial Resource Strain: Low Risk  (1/1/2024)    Financial Resource Strain     Within the past 12 months, have you or your family members you live with been unable to get utilities (heat, electricity) when it was really needed?: No   Alcohol Use: Not on file   Transportation Needs: Low Risk  (1/1/2024)    Transportation Needs     Within the past 12 months, has lack of transportation kept you from medical appointments, getting your medicines, non-medical meetings or appointments, work, or from getting things that you need?: No   Physical Activity: Not on file   Interpersonal Safety: Low Risk  (1/8/2024)    Interpersonal Safety     Do you feel physically and emotionally safe where you currently live?: Yes     Within the past 12 months, have you been hit, slapped, kicked or otherwise physically hurt by someone?: No     Within the past 12 months, have you been humiliated or emotionally abused in other ways by your partner or ex-partner?: No   Stress: Not on file   Social Connections: Not on file       Functional Status:  Prior to admission patient needed assistance:   Dependent ADLs:: Independent  Dependent IADLs:: Independent       Mental Health Status:  Mental Health Status: No Current Concerns       Chemical Dependency Status:  Chemical Dependency Status: No Current Concerns             Additional Information:  CM following for discharge planning. Patient s/p cervical laminoplasty 1/24. Therapy recommending home with assist. Patient lives with his wife and was independent in all ADL's prior to procedure. He has stairs in the home but plans to be on the main living area. His wife works from  home and will provide assistance. Patient would like a good pain management plan. No home care or other discharge services needed at this time.     Madhuri Juarez RN Case  Manager  Inpatient Care Coordination   [facility]   [phone]

## 2024-01-27 NOTE — PLAN OF CARE
Goal Outcome Evaluation:  Vital signs stable.  CMS intact, dressing to posterior neck intact, cervical collar on.  Ice pack applied.  Ambulated with walker, gait belt , 1 assist.  Voiding.  Speech clear, swallowing intact, airway patent.  Pain controlled with pain patches, tylenol, robaxin, atarax, iv dilaudid and oxycodone.    Plan of Care Reviewed With: patient, spouse

## 2024-01-28 LAB
POTASSIUM SERPL-SCNC: 3.9 MMOL/L (ref 3.4–5.3)
POTASSIUM SERPL-SCNC: 4.1 MMOL/L (ref 3.4–5.3)

## 2024-01-28 PROCEDURE — 250N000011 HC RX IP 250 OP 636: Performed by: HOSPITALIST

## 2024-01-28 PROCEDURE — 84132 ASSAY OF SERUM POTASSIUM: CPT | Performed by: HOSPITALIST

## 2024-01-28 PROCEDURE — 99232 SBSQ HOSP IP/OBS MODERATE 35: CPT | Performed by: INTERNAL MEDICINE

## 2024-01-28 PROCEDURE — 250N000013 HC RX MED GY IP 250 OP 250 PS 637: Performed by: HOSPITALIST

## 2024-01-28 PROCEDURE — G0378 HOSPITAL OBSERVATION PER HR: HCPCS

## 2024-01-28 PROCEDURE — 120N000001 HC R&B MED SURG/OB

## 2024-01-28 PROCEDURE — 250N000013 HC RX MED GY IP 250 OP 250 PS 637: Performed by: INTERNAL MEDICINE

## 2024-01-28 PROCEDURE — 36415 COLL VENOUS BLD VENIPUNCTURE: CPT | Performed by: HOSPITALIST

## 2024-01-28 RX ADMIN — LOSARTAN POTASSIUM 50 MG: 50 TABLET, FILM COATED ORAL at 10:10

## 2024-01-28 RX ADMIN — Medication 25 MCG: at 08:05

## 2024-01-28 RX ADMIN — LIDOCAINE 2 PATCH: 4 PATCH TOPICAL at 08:05

## 2024-01-28 RX ADMIN — DICLOFENAC SODIUM TOPICAL GEL, 1% 4 G: 10 GEL TOPICAL at 16:26

## 2024-01-28 RX ADMIN — HYDROXYZINE HYDROCHLORIDE 50 MG: 50 TABLET, FILM COATED ORAL at 20:05

## 2024-01-28 RX ADMIN — SENNOSIDES AND DOCUSATE SODIUM 1 TABLET: 8.6; 5 TABLET ORAL at 20:05

## 2024-01-28 RX ADMIN — VENLAFAXINE HYDROCHLORIDE 150 MG: 37.5 CAPSULE, EXTENDED RELEASE ORAL at 08:03

## 2024-01-28 RX ADMIN — DICLOFENAC SODIUM TOPICAL GEL, 1% 4 G: 10 GEL TOPICAL at 20:10

## 2024-01-28 RX ADMIN — ACETAMINOPHEN 975 MG: 325 TABLET, FILM COATED ORAL at 14:03

## 2024-01-28 RX ADMIN — OXYCODONE HYDROCHLORIDE 5 MG: 5 TABLET ORAL at 03:12

## 2024-01-28 RX ADMIN — METHOCARBAMOL 500 MG: 500 TABLET ORAL at 18:07

## 2024-01-28 RX ADMIN — OXYCODONE HYDROCHLORIDE 5 MG: 5 TABLET ORAL at 11:01

## 2024-01-28 RX ADMIN — HYDROXYZINE HYDROCHLORIDE 50 MG: 50 TABLET, FILM COATED ORAL at 05:27

## 2024-01-28 RX ADMIN — PANTOPRAZOLE SODIUM 40 MG: 40 TABLET, DELAYED RELEASE ORAL at 08:05

## 2024-01-28 RX ADMIN — OXYCODONE HYDROCHLORIDE 5 MG: 5 TABLET ORAL at 18:07

## 2024-01-28 RX ADMIN — OXYCODONE HYDROCHLORIDE 5 MG: 5 TABLET ORAL at 22:11

## 2024-01-28 RX ADMIN — HYDROCHLOROTHIAZIDE 25 MG: 25 TABLET ORAL at 10:10

## 2024-01-28 RX ADMIN — METHOCARBAMOL 500 MG: 500 TABLET ORAL at 01:56

## 2024-01-28 RX ADMIN — ACETAMINOPHEN 975 MG: 325 TABLET, FILM COATED ORAL at 08:04

## 2024-01-28 RX ADMIN — CYANOCOBALAMIN TAB 1000 MCG 1000 MCG: 1000 TAB at 08:05

## 2024-01-28 RX ADMIN — DICLOFENAC SODIUM TOPICAL GEL, 1% 4 G: 10 GEL TOPICAL at 08:28

## 2024-01-28 RX ADMIN — METHOCARBAMOL 500 MG: 500 TABLET ORAL at 08:30

## 2024-01-28 RX ADMIN — HYDROMORPHONE HYDROCHLORIDE 0.2 MG: 0.2 INJECTION, SOLUTION INTRAMUSCULAR; INTRAVENOUS; SUBCUTANEOUS at 09:12

## 2024-01-28 RX ADMIN — DICLOFENAC SODIUM TOPICAL GEL, 1% 4 G: 10 GEL TOPICAL at 14:04

## 2024-01-28 RX ADMIN — ACETAMINOPHEN 975 MG: 325 TABLET, FILM COATED ORAL at 20:05

## 2024-01-28 RX ADMIN — PSYLLIUM HUSK 2 PACKET: 3.4 POWDER ORAL at 08:05

## 2024-01-28 RX ADMIN — HYDROXYZINE HYDROCHLORIDE 50 MG: 50 TABLET, FILM COATED ORAL at 14:03

## 2024-01-28 ASSESSMENT — ACTIVITIES OF DAILY LIVING (ADL)
ADLS_ACUITY_SCORE: 27
ADLS_ACUITY_SCORE: 29
ADLS_ACUITY_SCORE: 27
ADLS_ACUITY_SCORE: 29
ADLS_ACUITY_SCORE: 27

## 2024-01-28 NOTE — PROGRESS NOTES
"[facility]    Hospitalist Progress Note  Provider : Kevin Awad MD, MD  Date of Service (when I saw the patient): 01/28/2024    Assessment & Plan   Lev Cabral is a 68 year old male with a history of hypertension and spinal stenosis with a cervical C3-C6 laminoplasty on 1/24 who presents with uncontrolled postoperative pain and spasms.     Problem list:  Postoperative cervical laminoplasty pain: He is neurologically and vascularly intact.  His incision appears to be healing well without signs of infection.   -He has worsening spasm   -PT consultation.    -Neurosurgery saw the patient and recommended to continue with pain management and conservative approach.  No surgery planned at this time.  Neurosurgery signed off  -Patient continues to have worsening neck pain. No new neurologic changes. Vitals and labs stable.   --Continue multimodal pain management    Hypokalemia: Continue potassium replacement protocol.  Potassium is 3.4.  Hyponatremia: Improved with IV fluids  Hypertension: Blood pressure is reasonable.  Continue prior to admission losartan and hydrochlorothiazide    Obesity: Estimated body mass index is 32.13 kg/m  as calculated from the following:    Height as of 1/8/24: 1.816 m (5' 11.5\").    Weight as of 1/8/24: 106 kg (233 lb 9.6 oz).            Code status: Full.  Admit to observation status.  Prophylaxis: PCD's.  Disposition: Home in 1 to 2 days.    DVT Prophylaxis: Pneumatic Compression Devices  Code Status: Full Code    Disposition: Expected discharge likely tomorrow if pain well-controlled    Kevin Awad MD    Interval History   Patient seen and examined today.  He stated that he has worsening neck pain today. He has no weakness of arms or legs. He denies fever. He has no nausea or vomiting. He has no cough     -Data reviewed today: I reviewed all new labs and imaging results over the last 24 hours. I personally reviewed no images or EKG's " today.    Physical Exam   Temp: 98.6  F (37  C) Temp src: Temporal BP: 137/67 Pulse: 63   Resp: 18 SpO2: 92 % O2 Device: None (Room air)    Vitals:    01/26/24 0215   Weight: 102.1 kg (225 lb)     Vital Signs with Ranges  Temp:  [97.5  F (36.4  C)-98.8  F (37.1  C)] 98.6  F (37  C)  Pulse:  [53-63] 63  Resp:  [18-20] 18  BP: ()/(60-74) 137/67  SpO2:  [92 %-98 %] 92 %  I/O last 3 completed shifts:  In: 720 [P.O.:720]  Out: -     GEN:  Alert, oriented x 3, appears comfortable, NAD.  HEENT:  Normocephalic/atraumatic, no scleral icterus, no nasal discharge, mouth moist.  CV:  Regular rate and rhythm, no murmur or JVD.  S1 + S2 noted, no S3 or S4.  LUNGS:  Clear to auscultation bilaterally without rales/rhonchi/wheezing/retractions.  Symmetric chest rise on inhalation noted.  ABD:  Active bowel sounds, soft, non-tender/non-distended.  No rebound/guarding/rigidity.  EXT:  No edema or cyanosis.  Hands/feet warm to touch with good signs of peripheral perfusion.  No joint synovitis noted.  SKIN:  Dry to touch, no exanthems noted in the visualized areas.  NEURO:  Symmetric muscle strength, sensation to touch grossly intact.  No new focal deficits appreciated.    Medications      acetaminophen  975 mg Oral TID    cyanocobalamin  1,000 mcg Oral Daily    diclofenac  4 g Topical 4x Daily    hydrochlorothiazide  25 mg Oral Daily    lidocaine  2 patch Transdermal Q24H    losartan  50 mg Oral Daily    menthol   Transdermal Q8H    pantoprazole  40 mg Oral Daily    psyllium  2 packet Oral Daily    senna-docusate  1 tablet Oral QPM    venlafaxine  150 mg Oral Daily    Vitamin D3  25 mcg Oral Daily       Data   Recent Labs   Lab 01/28/24  0621 01/27/24  2357 01/27/24  1157 01/26/24  0948 01/26/24  0442 01/25/24  2347 01/25/24  2303 01/25/24  2253   WBC  --   --   --   --   --   --   --  14.7*   HGB  --   --   --   --   --   --   --  15.1   MCV  --   --   --   --   --   --   --  89   PLT  --   --   --   --   --   --   --  176   NA   --   --   --   --  135  --   --  129*   POTASSIUM 4.1 3.9 3.4   < > 4.0   < >  --  8.7*   CHLORIDE  --   --   --   --  95*  --   --  94*   CO2  --   --   --   --  30*  --   --  27   BUN  --   --   --   --  23.1*  --   --  18.7   CR  --   --   --   --  0.95  --   --  0.80   ANIONGAP  --   --   --   --  10  --   --  8   JENNIFER  --   --   --   --  8.8  --   --  8.8   GLC  --   --   --   --  188*  --  177* 152*    < > = values in this interval not displayed.       No results found for this or any previous visit (from the past 24 hour(s)).

## 2024-01-28 NOTE — PLAN OF CARE
Goal Outcome Evaluation:      Plan of Care Reviewed With: patient    Overall Patient Progress: improving  A&Ox4  VSS  Pain: rates 2-3 while laying still in bed, increases with movement to 6/7 spasm like pain. Pt tenses up; given reminders to deep breathe  Activity: SBA/x1 GB walker   Voiding: w/o difficulty  GI: no concerns  Diet: reg  CMS: intact, denies numbess/tingling  Dressing: back of neck, dry gauze with tegaderm  Plan: TBD, could discharge home today with wife if pain control continues to improve. Pt states is getting better controlled

## 2024-01-28 NOTE — PLAN OF CARE
"Please see flowsheets for detailed vital signs and assessments.   Neuro: intact, Aox4. Able to communicate needs well.   Vital Signs: /63 (BP Location: Right arm)   Pulse 59   Temp 98.6  F (37  C) (Temporal)   Resp 18   Ht 1.803 m (5' 11\")   Wt 102.1 kg (225 lb)   SpO2 95%   BMI 31.38 kg/m     Pain: Pain managed with PRN IV Dilaudid x1, oxy, scheduled Tylenol, Atarax, Robaxin, Voltaren  gel and lidocaine patch  Tele: N/A  CMS: Intact  Respiratory: LS clear with no sign of respiratory distress or discomfort   GI: WDL  : voiding w/o difficulty  Skin: posterior cervical incision   Activity: up with A1 gait belt and walker, neck brace  Diet: tolerating regular diet   Protocols: K+ protocol, replaced x2, next recheck 2313 today.   Plan: continue with pain management, possible discharge tomorrow.     "

## 2024-01-29 ENCOUNTER — APPOINTMENT (OUTPATIENT)
Dept: PHYSICAL THERAPY | Facility: CLINIC | Age: 69
DRG: 947 | End: 2024-01-29
Payer: MEDICARE

## 2024-01-29 LAB
CREAT SERPL-MCNC: 1 MG/DL (ref 0.67–1.17)
EGFRCR SERPLBLD CKD-EPI 2021: 81 ML/MIN/1.73M2
ERYTHROCYTE [DISTWIDTH] IN BLOOD BY AUTOMATED COUNT: 12.5 % (ref 10–15)
HCT VFR BLD AUTO: 42.2 % (ref 40–53)
HGB BLD-MCNC: 14.3 G/DL (ref 13.3–17.7)
HOLD SPECIMEN: NORMAL
MCH RBC QN AUTO: 30.8 PG (ref 26.5–33)
MCHC RBC AUTO-ENTMCNC: 33.9 G/DL (ref 31.5–36.5)
MCV RBC AUTO: 91 FL (ref 78–100)
PLATELET # BLD AUTO: 194 10E3/UL (ref 150–450)
POTASSIUM SERPL-SCNC: 4.4 MMOL/L (ref 3.4–5.3)
RBC # BLD AUTO: 4.64 10E6/UL (ref 4.4–5.9)
WBC # BLD AUTO: 7.6 10E3/UL (ref 4–11)

## 2024-01-29 PROCEDURE — 99232 SBSQ HOSP IP/OBS MODERATE 35: CPT | Performed by: INTERNAL MEDICINE

## 2024-01-29 PROCEDURE — 97530 THERAPEUTIC ACTIVITIES: CPT | Mod: GP | Performed by: PHYSICAL THERAPIST

## 2024-01-29 PROCEDURE — G0378 HOSPITAL OBSERVATION PER HR: HCPCS

## 2024-01-29 PROCEDURE — 85027 COMPLETE CBC AUTOMATED: CPT | Performed by: INTERNAL MEDICINE

## 2024-01-29 PROCEDURE — 36415 COLL VENOUS BLD VENIPUNCTURE: CPT | Performed by: INTERNAL MEDICINE

## 2024-01-29 PROCEDURE — 250N000013 HC RX MED GY IP 250 OP 250 PS 637: Performed by: CLINICAL NURSE SPECIALIST

## 2024-01-29 PROCEDURE — 99223 1ST HOSP IP/OBS HIGH 75: CPT | Mod: 95 | Performed by: CLINICAL NURSE SPECIALIST

## 2024-01-29 PROCEDURE — 84132 ASSAY OF SERUM POTASSIUM: CPT | Performed by: INTERNAL MEDICINE

## 2024-01-29 PROCEDURE — 250N000013 HC RX MED GY IP 250 OP 250 PS 637: Performed by: INTERNAL MEDICINE

## 2024-01-29 PROCEDURE — 250N000011 HC RX IP 250 OP 636: Performed by: CLINICAL NURSE SPECIALIST

## 2024-01-29 PROCEDURE — 250N000011 HC RX IP 250 OP 636: Performed by: HOSPITALIST

## 2024-01-29 PROCEDURE — 82565 ASSAY OF CREATININE: CPT | Performed by: HOSPITALIST

## 2024-01-29 PROCEDURE — 120N000001 HC R&B MED SURG/OB

## 2024-01-29 PROCEDURE — 250N000013 HC RX MED GY IP 250 OP 250 PS 637: Performed by: HOSPITALIST

## 2024-01-29 PROCEDURE — 250N000011 HC RX IP 250 OP 636: Performed by: INTERNAL MEDICINE

## 2024-01-29 RX ORDER — HYDROMORPHONE HYDROCHLORIDE 1 MG/ML
0.5 INJECTION, SOLUTION INTRAMUSCULAR; INTRAVENOUS; SUBCUTANEOUS
Status: DISCONTINUED | OUTPATIENT
Start: 2024-01-29 | End: 2024-01-31

## 2024-01-29 RX ORDER — METHOCARBAMOL 750 MG/1
750 TABLET, FILM COATED ORAL EVERY 6 HOURS
Status: DISCONTINUED | OUTPATIENT
Start: 2024-01-29 | End: 2024-01-29

## 2024-01-29 RX ORDER — OXYCODONE HYDROCHLORIDE 10 MG/1
10 TABLET ORAL
Status: DISCONTINUED | OUTPATIENT
Start: 2024-01-29 | End: 2024-01-29

## 2024-01-29 RX ORDER — DIAZEPAM 5 MG
5 TABLET ORAL 3 TIMES DAILY PRN
Status: DISCONTINUED | OUTPATIENT
Start: 2024-01-29 | End: 2024-01-29

## 2024-01-29 RX ORDER — METHOCARBAMOL 750 MG/1
750 TABLET, FILM COATED ORAL EVERY 6 HOURS
Status: DISCONTINUED | OUTPATIENT
Start: 2024-01-29 | End: 2024-01-30

## 2024-01-29 RX ORDER — DIAZEPAM 5 MG
10 TABLET ORAL ONCE
Status: COMPLETED | OUTPATIENT
Start: 2024-01-29 | End: 2024-01-29

## 2024-01-29 RX ORDER — OXYCODONE HYDROCHLORIDE 5 MG/1
5-10 TABLET ORAL
Status: DISCONTINUED | OUTPATIENT
Start: 2024-01-29 | End: 2024-01-30

## 2024-01-29 RX ORDER — OXYCODONE HYDROCHLORIDE 10 MG/1
10 TABLET ORAL EVERY 6 HOURS
Status: DISCONTINUED | OUTPATIENT
Start: 2024-01-29 | End: 2024-01-31

## 2024-01-29 RX ORDER — DIAZEPAM 5 MG
5 TABLET ORAL ONCE
Qty: 1 TABLET | Refills: 0 | Status: DISCONTINUED | OUTPATIENT
Start: 2024-01-29 | End: 2024-01-29 | Stop reason: ALTCHOICE

## 2024-01-29 RX ORDER — OXYCODONE HYDROCHLORIDE 5 MG/1
5-10 TABLET ORAL EVERY 6 HOURS
Status: DISCONTINUED | OUTPATIENT
Start: 2024-01-29 | End: 2024-01-29

## 2024-01-29 RX ADMIN — METHOCARBAMOL 500 MG: 500 TABLET ORAL at 00:27

## 2024-01-29 RX ADMIN — DICLOFENAC SODIUM TOPICAL GEL, 1% 4 G: 10 GEL TOPICAL at 16:07

## 2024-01-29 RX ADMIN — HYDROMORPHONE HYDROCHLORIDE 0.2 MG: 0.2 INJECTION, SOLUTION INTRAMUSCULAR; INTRAVENOUS; SUBCUTANEOUS at 04:12

## 2024-01-29 RX ADMIN — VENLAFAXINE HYDROCHLORIDE 150 MG: 37.5 CAPSULE, EXTENDED RELEASE ORAL at 08:04

## 2024-01-29 RX ADMIN — OXYCODONE HYDROCHLORIDE 10 MG: 10 TABLET ORAL at 16:04

## 2024-01-29 RX ADMIN — DICLOFENAC SODIUM TOPICAL GEL, 1% 4 G: 10 GEL TOPICAL at 12:55

## 2024-01-29 RX ADMIN — OXYCODONE HYDROCHLORIDE 5 MG: 5 TABLET ORAL at 08:05

## 2024-01-29 RX ADMIN — HYDROMORPHONE HYDROCHLORIDE 1 MG: 1 INJECTION, SOLUTION INTRAMUSCULAR; INTRAVENOUS; SUBCUTANEOUS at 14:41

## 2024-01-29 RX ADMIN — SENNOSIDES AND DOCUSATE SODIUM 1 TABLET: 8.6; 5 TABLET ORAL at 20:06

## 2024-01-29 RX ADMIN — HYDROMORPHONE HYDROCHLORIDE 0.4 MG: 1 INJECTION, SOLUTION INTRAMUSCULAR; INTRAVENOUS; SUBCUTANEOUS at 06:39

## 2024-01-29 RX ADMIN — DICLOFENAC SODIUM TOPICAL GEL, 1% 4 G: 10 GEL TOPICAL at 20:10

## 2024-01-29 RX ADMIN — LOSARTAN POTASSIUM 50 MG: 50 TABLET, FILM COATED ORAL at 08:04

## 2024-01-29 RX ADMIN — DICLOFENAC SODIUM TOPICAL GEL, 1% 4 G: 10 GEL TOPICAL at 08:06

## 2024-01-29 RX ADMIN — OXYCODONE HYDROCHLORIDE 10 MG: 10 TABLET ORAL at 22:00

## 2024-01-29 RX ADMIN — ACETAMINOPHEN 975 MG: 325 TABLET, FILM COATED ORAL at 14:47

## 2024-01-29 RX ADMIN — LIDOCAINE 2 PATCH: 4 PATCH TOPICAL at 08:05

## 2024-01-29 RX ADMIN — PSYLLIUM HUSK 2 PACKET: 3.4 POWDER ORAL at 08:06

## 2024-01-29 RX ADMIN — CYANOCOBALAMIN TAB 1000 MCG 1000 MCG: 1000 TAB at 08:04

## 2024-01-29 RX ADMIN — DIAZEPAM 10 MG: 5 TABLET ORAL at 18:32

## 2024-01-29 RX ADMIN — HYDROXYZINE HYDROCHLORIDE 50 MG: 50 TABLET, FILM COATED ORAL at 02:23

## 2024-01-29 RX ADMIN — Medication 535 MG: at 20:06

## 2024-01-29 RX ADMIN — METHOCARBAMOL 750 MG: 750 TABLET ORAL at 22:00

## 2024-01-29 RX ADMIN — OXYCODONE HYDROCHLORIDE 5 MG: 5 TABLET ORAL at 02:23

## 2024-01-29 RX ADMIN — PANTOPRAZOLE SODIUM 40 MG: 40 TABLET, DELAYED RELEASE ORAL at 08:04

## 2024-01-29 RX ADMIN — HYDROCHLOROTHIAZIDE 25 MG: 25 TABLET ORAL at 08:04

## 2024-01-29 RX ADMIN — Medication 25 MCG: at 08:04

## 2024-01-29 RX ADMIN — METHOCARBAMOL 500 MG: 500 TABLET ORAL at 08:04

## 2024-01-29 RX ADMIN — ACETAMINOPHEN 975 MG: 325 TABLET, FILM COATED ORAL at 20:06

## 2024-01-29 RX ADMIN — OXYCODONE HYDROCHLORIDE 5 MG: 5 TABLET ORAL at 13:05

## 2024-01-29 RX ADMIN — HYDROXYZINE HYDROCHLORIDE 50 MG: 50 TABLET, FILM COATED ORAL at 13:07

## 2024-01-29 RX ADMIN — ACETAMINOPHEN 975 MG: 325 TABLET, FILM COATED ORAL at 08:06

## 2024-01-29 ASSESSMENT — ACTIVITIES OF DAILY LIVING (ADL)
ADLS_ACUITY_SCORE: 29

## 2024-01-29 NOTE — CONSULTS
"[facility] ACUTE PAIN SERVICE    ([facility], [facility], [facility], [facility])  Pain Consult  Visit/Communication Style   Virtual (Video) communication was used to evaluate Lev.  Lev consented to the use of video communication: yes  Video START time: 1340, 1/29/2024  Video STOP time: 1420, 1/29/2024   Patient's location: [facility]   Provider's location during the visit: [facility] Tele-medicine site     Assessment/Plan:  Lev Cabral is a 68 year old male who was admitted on 1/25/2024.  Pain Service is asked to see the patient for Acute postop neck pain. Recent cervical laminoplasty.  Admitted for evaluation of worsening neck pian and left sided shoulder pain.  Patient is status post laminectomy of the cervical spine performed at [place] status post posterior cervical laminoplasty this past week after abnormal MRI showing severe stenosis with cord abnormality.    He had been discharged from hospital 1/23 to 1/24 and came to [facility] ED 1/16 ater developed severe pain in his neck.    Neurosurgery Consulted:    History of hypertension and spinal stenosis with a cervical C3-C6 laminoplasty on 1/24.      shows  1/25/2024 Oxycodone 5 mg  30 tablets in 5 days  01/25/2024 Diazepam 5 mg 25 tablets for 9 days  03/29/2023 Diazepam 10 mg 4 tablets for 4 days    Over the past 24 hours Alejo received:    Scheduled tylenol, 5(5mg) oxycodone 1(0.4mg) IV and 1(0.2mg) IV hydromorphone about 50 MME    Describes pain as intense pain.  Can't move his neck, feels like it is \"cemented\"  to his body.  Pain is localized around incision and to his left shoulder.    Pain has been intractable since Friday evening. Denies numbness and has good  strength.      PLAN:  Acute post operative pain, incisional with musculoskeletal component, spasms.   Multimodal Medication Therapy:   Adjuvants: Tylenol 975 mg tid, lidocaine patch 2 patches, diclofenac gel qid, vistaril 25-50 mg every " 6 hours prn, Robaxin 750 mg every 6 hours (will discontinue the valium and try the Robaxin first)  Add magnesium for spasms  Opioids: oxycodone 10 mg every 6 hours with oxycodone 5-10 mg every three hours prn   IV hydromorphone 0.5 mg every three hours prn, please give 1 mg dose now.    Non-medication interventions- Ice prn  Constipation Prophylaxis- daily stool softener/laxative   Follow up /Discharge Recommendations - We recommend prescribing the following at the time of discharge:  oxycodone 5-10 mg every four hours prn, Robaxin 750 mg every 6 hours for 5-7 days then prn    Consider steroids, defer to Neurosurgery     Subjective:  History of back and neck pain but has managed without strong pain medications  Denies numbness, tingling, has good strength.    Unable to sit up or move neck due to pain.  Sharp around incision, gripping.                Acute post-operative pain   Patient Active Problem List   Diagnosis    CARDIOVASCULAR SCREENING; LDL GOAL LESS THAN 130    Family history of diabetes mellitus    Advanced directives, counseling/discussion    Cervicalgia    Acute reaction to stress    Peyronie's disease    Mass of chest wall, left    Chondrosarcoma of ribs (H)    Mild ascending aorta dilation (H24)    Acute post-operative pain        History   Drug Use No         Tobacco Use      Smoking status: Never        Passive exposure: Never      Smokeless tobacco: Never         acetaminophen  975 mg Oral TID    cyanocobalamin  1,000 mcg Oral Daily    diclofenac  4 g Topical 4x Daily    hydrochlorothiazide  25 mg Oral Daily    lidocaine  2 patch Transdermal Q24H    losartan  50 mg Oral Daily    menthol   Transdermal Q8H    pantoprazole  40 mg Oral Daily    psyllium  2 packet Oral Daily    senna-docusate  1 tablet Oral QPM    venlafaxine  150 mg Oral Daily    Vitamin D3  25 mcg Oral Daily       Objective:  Vital signs in last 24 hours:  B/P: 139/80, T: 97.6, P: 58, R: 16   Blood pressure 139/80, pulse 58,  "temperature 97.6  F (36.4  C), temperature source Temporal, resp. rate 16, height 1.803 m (5' 11\"), weight 102.1 kg (225 lb), SpO2 94%.      Weight:   Wt Readings from Last 2 Encounters:   01/26/24 102.1 kg (225 lb)   01/08/24 106 kg (233 lb 9.6 oz)         Intake/Output:    Intake/Output Summary (Last 24 hours) at 1/29/2024 1311  Last data filed at 1/29/2024 1058  Gross per 24 hour   Intake 240 ml   Output 500 ml   Net -260 ml        Review of Systems:   As per subjective, all others negative.    Physical Exam:     General Appearance:  Alert, cooperative, rests on side, wearing aspen neck collar wife at bedside,    Head:  Normocephalic, without obvious abnormality, atraumatic   Eyes:  PERRL, conjunctiva/corneas clear, EOM's intact   Lungs:    On room air, respirations unlabored   Musculoskeletal: Good strength opens and closes fists without difficulty   Skin: Skin is intact    Neurologic: Alert and oriented X 3,limited mobility due to pain         Imaging:  Personally Reviewed.    No results found for this visit on 01/25/24.     Lab Results:  Personally Reviewed.   Last Comprehensive Metabolic Panel:  Sodium   Date Value Ref Range Status   01/26/2024 135 135 - 145 mmol/L Final     Comment:     Reference intervals for this test were updated on 09/26/2023 to more accurately reflect our healthy population. There may be differences in the flagging of prior results with similar values performed with this method. Interpretation of those prior results can be made in the context of the updated reference intervals.    10/16/2020 138 133 - 144 mmol/L Final     Potassium   Date Value Ref Range Status   01/29/2024 4.4 3.4 - 5.3 mmol/L Final   11/10/2022 4.4 3.4 - 5.3 mmol/L Final   10/16/2020 5.1 3.4 - 5.3 mmol/L Final     Chloride   Date Value Ref Range Status   01/26/2024 95 (L) 98 - 107 mmol/L Final   11/10/2022 108 94 - 109 mmol/L Final   10/16/2020 107 94 - 109 mmol/L Final     Carbon Dioxide   Date Value Ref Range Status "   10/16/2020 26 20 - 32 mmol/L Final     Carbon Dioxide (CO2)   Date Value Ref Range Status   01/26/2024 30 (H) 22 - 29 mmol/L Final   11/10/2022 26 20 - 32 mmol/L Final     Anion Gap   Date Value Ref Range Status   01/26/2024 10 7 - 15 mmol/L Final   11/10/2022 5 3 - 14 mmol/L Final   10/16/2020 5 3 - 14 mmol/L Final     Glucose   Date Value Ref Range Status   01/26/2024 188 (H) 70 - 99 mg/dL Final   11/10/2022 107 (H) 70 - 99 mg/dL Final   10/16/2020 106 (H) 70 - 99 mg/dL Final     Comment:     Fasting specimen     GLUCOSE BY METER POCT   Date Value Ref Range Status   01/25/2024 177 (H) 70 - 99 mg/dL Final     Urea Nitrogen   Date Value Ref Range Status   01/26/2024 23.1 (H) 8.0 - 23.0 mg/dL Final   11/10/2022 17 7 - 30 mg/dL Final   10/16/2020 18 7 - 30 mg/dL Final     Creatinine   Date Value Ref Range Status   01/26/2024 0.95 0.67 - 1.17 mg/dL Final   10/16/2020 1.02 0.66 - 1.25 mg/dL Final     GFR Estimate   Date Value Ref Range Status   01/26/2024 87 >60 mL/min/1.73m2 Final   10/16/2020 76 >60 mL/min/[1.73_m2] Final     Comment:     Non  GFR Calc  Starting 12/18/2018, serum creatinine based estimated GFR (eGFR) will be   calculated using the Chronic Kidney Disease Epidemiology Collaboration   (CKD-EPI) equation.       Calcium   Date Value Ref Range Status   01/26/2024 8.8 8.8 - 10.2 mg/dL Final   10/16/2020 9.3 8.5 - 10.1 mg/dL Final          MANAGEMENT DISCUSSED with the following over the past 24 hours: RN   NOTE(S)/MEDICAL RECORDS REVIEWED over the past 24 hours: Hospital Medicine, Neurosurgery  Medical complexity over the past 24 hours:  - Parenteral (IV) CONTROLLED SUBSTANCES ordered  - Prescription DRUG MANAGEMENT performed  Wife participated in interview and provided background information post operative medications    DEQUAN Brandt, DNP CNS  Acute Inpatient Pain Team  M-F   Paging via [employer] or [employer]

## 2024-01-29 NOTE — PLAN OF CARE
Goal Outcome Evaluation:      Plan of Care Reviewed With: patient    Overall Patient Progress: improving    A&Ox4  VSS  Pain: rates 2-3 when in bed, movement of left arm increased pain this shift, 8-10  Activity: x1 GB/walker   Voiding: w/o difficulty  GI: last BM 1/24  Diet: reg  CMS: intact, denies numbness/tingling  Dressing on posterior neck; small dried drainage   Plan: pain plan overnight was Oxy x2, Robaxin x1, and Atarax  x1; pt required IV dilaudid 0.2mg x1 and 0.4mg x1; would benefit from pain team consult

## 2024-01-29 NOTE — UTILIZATION REVIEW
"Admission Status; Secondary Review Determination     Admission Date: 1/25/2024 10:22 PM       Under the authority of the Utilization Management Committee, the utilization review process indicated a secondary review on the above patient.  The review outcome is based on review of the medical records, discussions with staff, and applying clinical experience noted on the date of the review.        (x)      Inpatient Status Appropriate - This patient's medical care is consistent with medical management for inpatient care and reasonable inpatient medical practice.       RATIONALE FOR DETERMINATION      Brief clinical presentation, information copied from the chart, abbreviated and edited for relevant content:     Discussed with attending. Patient has been in for several days with uncontrolled post op pain after discharge, had been readmitted for this issue. Advancing to .     Lev Cabral is a 68 year old male who was admitted on 1/25/2024.    Admitted for evaluation of worsening neck pian and left sided shoulder pain.  Patient is status post laminectomy of the cervical spine performed at [place] status post posterior cervical laminoplasty this past week after abnormal MRI showing severe stenosis with cord abnormality.  Discharged 1/24. Over the past 24 hours Alejo received:  Scheduled tylenol, 5(5mg) oxycodone 1(0.4mg) IV and 1(0.2mg) IV hydromorphone about 50 MME. Continues to Describes pain as intense pain.  Can't move his neck, feels like it is \"cemented\"  to his body.  Pain is localized around incision and to his left shoulder.  Pain has been intractable since Friday evening.  Pain medicine consulted and attempting to reach [place] surgical team. Ortho team consulted here as well.           At the time of admission with the information available to the attending physician, more than 2 nights hospital complex care was anticipated. Also, there was a risk of adverse outcome if patient was treated outpatient or " observation. High intensity of services anticipated. Inpatient admission appropriate based on Medicare guidelines.       The information on this document is developed by the utilization review team in order for the business office to ensure compliance.  This only denotes the appropriateness of proper admission status and does not reflect the quality of care rendered.         The definitions of Inpatient Status and Observation Status used in making the determination above are those provided in the CMS Coverage Manual, Chapter 1 and Chapter 6, section 70.4.      Sincerely,      Ryann Marie MD   Utilization Review/ Case Management  [facility].

## 2024-01-29 NOTE — PLAN OF CARE
Goal Outcome Evaluation:    PRIMARY DIAGNOSIS: Acute Pain   OUTPATIENT/OBSERVATION GOALS TO BE MET BEFORE DISCHARGE:  1. ADLs back to baseline: Yes     2. Activity and level of assistance: Assist x1     3. Pain status: Improved but still requiring IV narcotics     4. Return to near baseline physical activity: Yes          Discharge Planner Nurse   Safe discharge environment identified: Yes    Please review provider order for any additional goals.   Nurse to notify provider when observation goals have been met and patient is ready for discharge.     Patient its A/O X4. VSS, on RA. PRN Oxycodone, and Robaxin given for pain. On K protocol. Soft collar on. Swallowing w/o difficulty. Pain team consulted. Discharge plan TBD.

## 2024-01-29 NOTE — PROGRESS NOTES
"[facility]    Hospitalist Progress Note  Provider : Kevin Awad MD, MD  Date of Service (when I saw the patient): 01/29/2024    Assessment & Plan   Lev Cabral is a 68 year old male with a history of hypertension and spinal stenosis with a cervical C3-C6 laminoplasty on 1/24 who presents with uncontrolled postoperative pain and spasms.     Problem list:  Postoperative cervical laminoplasty pain: He is neurologically and vascularly intact.  His incision appears to be healing well without signs of infection.   -He has worsening spasm   -PT consultation.    -Neurosurgery saw the patient and recommended to continue with pain management and conservative approach.  No surgery planned at this time.  Neurosurgery signed off  -Patient continues to have worsening neck pain. No new neurologic changes. Vitals and labs stable.   -Continue multimodal pain management  -No evidence of infection. No fever. WBC normal. Surgical dressing still in place.  -Change dressing per postop instructions.  -Pain team consulted due to poor pain control  -Also called [facility] to discuss with Dr. Pepper and awaiting call back    Hypokalemia: Continue potassium replacement protocol.  Potassium is 4.4 today.  Hyponatremia: Improved with IV fluids  Hypertension: Blood pressure is reasonable.  Continue prior to admission losartan and hydrochlorothiazide    Obesity: Estimated body mass index is 32.13 kg/m  as calculated from the following:    Height as of 1/8/24: 1.816 m (5' 11.5\").    Weight as of 1/8/24: 106 kg (233 lb 9.6 oz).            Code status: Full.  Admit to observation status.  Prophylaxis: PCD's.  Disposition: Home in 1 to 2 days.    DVT Prophylaxis: Pneumatic Compression Devices  Code Status: Full Code    Disposition: Expected discharge likely tomorrow if pain well-controlled    Kevin Awad MD    Interval History   Patient seen and examined today.  Continues to have significant neck " pain. Has no fever. Denies nausea or vomiting.    -Data reviewed today: I reviewed all new labs and imaging results over the last 24 hours. I personally reviewed no images or EKG's today.    Physical Exam   Temp: 97.6  F (36.4  C) Temp src: Temporal BP: 139/80 Pulse: 58   Resp: 16 SpO2: 94 % O2 Device: None (Room air)    Vitals:    01/26/24 0215   Weight: 102.1 kg (225 lb)     Vital Signs with Ranges  Temp:  [97.6  F (36.4  C)-99.1  F (37.3  C)] 97.6  F (36.4  C)  Pulse:  [56-68] 58  Resp:  [16-18] 16  BP: ()/(49-80) 139/80  SpO2:  [92 %-95 %] 94 %  I/O last 3 completed shifts:  In: 240 [P.O.:240]  Out: -     GEN:  Alert, oriented x 3, appears comfortable, NAD.  HEENT:  Normocephalic/atraumatic, no scleral icterus, no nasal discharge, mouth moist.  CV:  Regular rate and rhythm, no murmur or JVD.  S1 + S2 noted, no S3 or S4.  LUNGS:  Clear to auscultation bilaterally without rales/rhonchi/wheezing/retractions.  Symmetric chest rise on inhalation noted.  ABD:  Active bowel sounds, soft, non-tender/non-distended.  No rebound/guarding/rigidity.  EXT:  No edema or cyanosis.  Hands/feet warm to touch with good signs of peripheral perfusion.  No joint synovitis noted.  SKIN:  Dry to touch, no exanthems noted in the visualized areas.  NEURO:  Symmetric muscle strength, sensation to touch grossly intact.  No new focal deficits appreciated.    Medications      acetaminophen  975 mg Oral TID    cyanocobalamin  1,000 mcg Oral Daily    diclofenac  4 g Topical 4x Daily    hydrochlorothiazide  25 mg Oral Daily    lidocaine  2 patch Transdermal Q24H    losartan  50 mg Oral Daily    menthol   Transdermal Q8H    pantoprazole  40 mg Oral Daily    psyllium  2 packet Oral Daily    senna-docusate  1 tablet Oral QPM    venlafaxine  150 mg Oral Daily    Vitamin D3  25 mcg Oral Daily       Data   Recent Labs   Lab 01/29/24  0720 01/28/24  0621 01/27/24  2357 01/26/24  0948 01/26/24  0442 01/25/24  2347 01/25/24  2303 01/25/24  2251    WBC 7.6  --   --   --   --   --   --  14.7*   HGB 14.3  --   --   --   --   --   --  15.1   MCV 91  --   --   --   --   --   --  89     --   --   --   --   --   --  176   NA  --   --   --   --  135  --   --  129*   POTASSIUM 4.4 4.1 3.9   < > 4.0   < >  --  8.7*   CHLORIDE  --   --   --   --  95*  --   --  94*   CO2  --   --   --   --  30*  --   --  27   BUN  --   --   --   --  23.1*  --   --  18.7   CR  --   --   --   --  0.95  --   --  0.80   ANIONGAP  --   --   --   --  10  --   --  8   JENNIFER  --   --   --   --  8.8  --   --  8.8   GLC  --   --   --   --  188*  --  177* 152*    < > = values in this interval not displayed.       No results found for this or any previous visit (from the past 24 hour(s)).

## 2024-01-29 NOTE — PLAN OF CARE
Pt continues to report severe pain requiring IV pain meds. Gave all available pain meds (see MAR)throughout the day with some relief.  Dr. Awad aware. Neuros intact. Dressing with small dry drainage. Voiding without any issues. Will continue to provide supportive care.

## 2024-01-30 ENCOUNTER — APPOINTMENT (OUTPATIENT)
Dept: PHYSICAL THERAPY | Facility: CLINIC | Age: 69
DRG: 947 | End: 2024-01-30
Payer: MEDICARE

## 2024-01-30 LAB
ANION GAP SERPL CALCULATED.3IONS-SCNC: 8 MMOL/L (ref 7–15)
BUN SERPL-MCNC: 15.9 MG/DL (ref 8–23)
CALCIUM SERPL-MCNC: 9.2 MG/DL (ref 8.8–10.2)
CHLORIDE SERPL-SCNC: 97 MMOL/L (ref 98–107)
CREAT SERPL-MCNC: 0.86 MG/DL (ref 0.67–1.17)
DEPRECATED HCO3 PLAS-SCNC: 29 MMOL/L (ref 22–29)
EGFRCR SERPLBLD CKD-EPI 2021: >90 ML/MIN/1.73M2
ERYTHROCYTE [DISTWIDTH] IN BLOOD BY AUTOMATED COUNT: 12.4 % (ref 10–15)
GLUCOSE SERPL-MCNC: 106 MG/DL (ref 70–99)
HCT VFR BLD AUTO: 44.3 % (ref 40–53)
HGB BLD-MCNC: 15.2 G/DL (ref 13.3–17.7)
MCH RBC QN AUTO: 30.7 PG (ref 26.5–33)
MCHC RBC AUTO-ENTMCNC: 34.3 G/DL (ref 31.5–36.5)
MCV RBC AUTO: 90 FL (ref 78–100)
PLATELET # BLD AUTO: 192 10E3/UL (ref 150–450)
POTASSIUM SERPL-SCNC: 4 MMOL/L (ref 3.4–5.3)
RBC # BLD AUTO: 4.95 10E6/UL (ref 4.4–5.9)
SODIUM SERPL-SCNC: 134 MMOL/L (ref 135–145)
WBC # BLD AUTO: 7.8 10E3/UL (ref 4–11)

## 2024-01-30 PROCEDURE — 99232 SBSQ HOSP IP/OBS MODERATE 35: CPT | Performed by: CLINICAL NURSE SPECIALIST

## 2024-01-30 PROCEDURE — 120N000001 HC R&B MED SURG/OB

## 2024-01-30 PROCEDURE — 85027 COMPLETE CBC AUTOMATED: CPT | Performed by: INTERNAL MEDICINE

## 2024-01-30 PROCEDURE — 36415 COLL VENOUS BLD VENIPUNCTURE: CPT | Performed by: INTERNAL MEDICINE

## 2024-01-30 PROCEDURE — 99232 SBSQ HOSP IP/OBS MODERATE 35: CPT | Performed by: INTERNAL MEDICINE

## 2024-01-30 PROCEDURE — 250N000013 HC RX MED GY IP 250 OP 250 PS 637: Performed by: HOSPITALIST

## 2024-01-30 PROCEDURE — 250N000013 HC RX MED GY IP 250 OP 250 PS 637: Performed by: CLINICAL NURSE SPECIALIST

## 2024-01-30 PROCEDURE — 80048 BASIC METABOLIC PNL TOTAL CA: CPT | Performed by: INTERNAL MEDICINE

## 2024-01-30 PROCEDURE — 250N000013 HC RX MED GY IP 250 OP 250 PS 637: Performed by: INTERNAL MEDICINE

## 2024-01-30 PROCEDURE — 97530 THERAPEUTIC ACTIVITIES: CPT | Mod: GP

## 2024-01-30 RX ORDER — DIAZEPAM 5 MG
10 TABLET ORAL 3 TIMES DAILY
Status: DISCONTINUED | OUTPATIENT
Start: 2024-01-30 | End: 2024-02-01

## 2024-01-30 RX ORDER — OXYCODONE HYDROCHLORIDE 5 MG/1
5 TABLET ORAL
Status: DISCONTINUED | OUTPATIENT
Start: 2024-01-30 | End: 2024-01-31

## 2024-01-30 RX ADMIN — Medication 25 MCG: at 08:01

## 2024-01-30 RX ADMIN — DIAZEPAM 10 MG: 5 TABLET ORAL at 08:53

## 2024-01-30 RX ADMIN — DIAZEPAM 10 MG: 5 TABLET ORAL at 20:07

## 2024-01-30 RX ADMIN — ACETAMINOPHEN 975 MG: 325 TABLET, FILM COATED ORAL at 14:36

## 2024-01-30 RX ADMIN — OXYCODONE HYDROCHLORIDE 10 MG: 10 TABLET ORAL at 03:45

## 2024-01-30 RX ADMIN — HYDROCHLOROTHIAZIDE 25 MG: 25 TABLET ORAL at 08:01

## 2024-01-30 RX ADMIN — CYANOCOBALAMIN TAB 1000 MCG 1000 MCG: 1000 TAB at 08:01

## 2024-01-30 RX ADMIN — ACETAMINOPHEN 975 MG: 325 TABLET, FILM COATED ORAL at 20:06

## 2024-01-30 RX ADMIN — OXYCODONE HYDROCHLORIDE 10 MG: 10 TABLET ORAL at 21:15

## 2024-01-30 RX ADMIN — LIDOCAINE 2 PATCH: 4 PATCH TOPICAL at 08:06

## 2024-01-30 RX ADMIN — PSYLLIUM HUSK 2 PACKET: 3.4 POWDER ORAL at 08:02

## 2024-01-30 RX ADMIN — DICLOFENAC SODIUM TOPICAL GEL, 1% 4 G: 10 GEL TOPICAL at 20:08

## 2024-01-30 RX ADMIN — PANTOPRAZOLE SODIUM 40 MG: 40 TABLET, DELAYED RELEASE ORAL at 08:01

## 2024-01-30 RX ADMIN — SENNOSIDES AND DOCUSATE SODIUM 1 TABLET: 8.6; 5 TABLET ORAL at 20:06

## 2024-01-30 RX ADMIN — Medication 535 MG: at 08:01

## 2024-01-30 RX ADMIN — DICLOFENAC SODIUM TOPICAL GEL, 1% 4 G: 10 GEL TOPICAL at 08:10

## 2024-01-30 RX ADMIN — METHOCARBAMOL 750 MG: 750 TABLET ORAL at 03:44

## 2024-01-30 RX ADMIN — VENLAFAXINE HYDROCHLORIDE 150 MG: 37.5 CAPSULE, EXTENDED RELEASE ORAL at 08:01

## 2024-01-30 RX ADMIN — DIAZEPAM 10 MG: 5 TABLET ORAL at 14:36

## 2024-01-30 RX ADMIN — ACETAMINOPHEN 975 MG: 325 TABLET, FILM COATED ORAL at 08:02

## 2024-01-30 RX ADMIN — Medication 535 MG: at 20:07

## 2024-01-30 ASSESSMENT — ACTIVITIES OF DAILY LIVING (ADL)
ADLS_ACUITY_SCORE: 28
ADLS_ACUITY_SCORE: 29
CHANGE_IN_FUNCTIONAL_STATUS_SINCE_ONSET_OF_CURRENT_ILLNESS/INJURY: NO
ADLS_ACUITY_SCORE: 28
ADLS_ACUITY_SCORE: 29
ADLS_ACUITY_SCORE: 28
ADLS_ACUITY_SCORE: 28
CONCENTRATING,_REMEMBERING_OR_MAKING_DECISIONS_DIFFICULTY: NO
ADLS_ACUITY_SCORE: 28
ADLS_ACUITY_SCORE: 28
ADLS_ACUITY_SCORE: 29
ADLS_ACUITY_SCORE: 28
ADLS_ACUITY_SCORE: 28
ADLS_ACUITY_SCORE: 29

## 2024-01-30 NOTE — PLAN OF CARE
A&O. VSS. Ax1 GB/walker. Pain managed with lizy oxy and robaxin. Dressing with dried drainage. Soft neck brace on. CMS intact. Voiding. On K protocol.

## 2024-01-30 NOTE — PLAN OF CARE
Goal Outcome Evaluation:    Patient alert and oriented x4. Reporting severe pain not controlled with medications. Pain team following. Not OOB. Regular diet. Awaiting discharge plan.

## 2024-01-30 NOTE — PLAN OF CARE
Aox4. Able to communicate needs well  Reported spasm with severe pain, scheduled Tylenol, oxy and Robaxin given.   Placed pt on continues for close monitoring.  Dressing small dry drainage. CMS intact. Voiding without any issues.   Will continue to provide supportive care.

## 2024-01-30 NOTE — PROGRESS NOTES
"[facility]    Hospitalist Progress Note  Provider : Kevin Awad MD, MD  Date of Service (when I saw the patient): 01/30/2024    Assessment & Plan   Lev Cabral is a 68 year old male with a history of hypertension and spinal stenosis with a cervical C3-C6 laminoplasty on 1/24 who presents with uncontrolled postoperative pain and spasms.     Problem list:    Postoperative cervical laminoplasty pain: He is neurologically and vascularly intact.  His incision appears to be healing well without signs of infection.   -He has worsening spasm   -PT consultation.    -Neurosurgery saw the patient and recommended to continue with pain management and conservative approach.  No surgery planned at this time.  Neurosurgery signed off  -Patient continues to have worsening neck pain. No new neurologic changes. Vitals and labs stable.   -Continue multimodal pain management  -No evidence of infection. No fever. WBC normal.   -Pain team following for optimal pain control. Appreciate pain team input  -Called [facility] to discuss with Dr. Pepper and left a message with staff. His spine surgery team at [place] can be reached at [phone]    Hypokalemia: Continue potassium replacement protocol.  Potassium is 4.4 today.    Hyponatremia: Improved with IV fluids    Hypertension: Blood pressure is reasonable. Continue prior to admission losartan and hydrochlorothiazide    Obesity: Estimated body mass index is 32.13 kg/m  as calculated from the following:    Height as of 1/8/24: 1.816 m (5' 11.5\").    Weight as of 1/8/24: 106 kg (233 lb 9.6 oz).           DVT Prophylaxis: Pneumatic Compression Devices  Code Status: Full Code    Disposition: Expected discharge likely tomorrow if pain well-controlled. PT/OT following for discharge recommendations     Kevin Awad MD    Interval History   Patient seen and examined today.  Continues to have significant neck pain and spasm. He denies any weakness of " arms or legs. No nausea or vomiting. Denies fever.     -Data reviewed today: I reviewed all new labs and imaging results over the last 24 hours. I personally reviewed no images or EKG's today.    Physical Exam   Temp: 98.4  F (36.9  C) Temp src: Temporal BP: 113/68 Pulse: 60   Resp: 16 SpO2: 95 % O2 Device: None (Room air)    Vitals:    01/26/24 0215   Weight: 102.1 kg (225 lb)     Vital Signs with Ranges  Temp:  [96.6  F (35.9  C)-98.4  F (36.9  C)] 98.4  F (36.9  C)  Pulse:  [56-84] 60  Resp:  [14-20] 16  BP: (113-150)/(68-84) 113/68  SpO2:  [94 %-97 %] 95 %  I/O last 3 completed shifts:  In: -   Out: 500 [Urine:500]    GEN:  Alert, oriented x 3, appears comfortable, NAD.  HEENT:  Normocephalic/atraumatic, no scleral icterus, no nasal discharge, mouth moist.  CV:  Regular rate and rhythm, no murmur or JVD.  S1 + S2 noted, no S3 or S4.  LUNGS:  Clear to auscultation bilaterally without rales/rhonchi/wheezing/retractions.  Symmetric chest rise on inhalation noted.  ABD:  Active bowel sounds, soft, non-tender/non-distended.  No rebound/guarding/rigidity.  EXT:  No edema or cyanosis.  Hands/feet warm to touch with good signs of peripheral perfusion.  No joint synovitis noted.  SKIN:  Dry to touch, no exanthems noted in the visualized areas.  NEURO:  Symmetric muscle strength, sensation to touch grossly intact.  No new focal deficits appreciated.    Medications      acetaminophen  975 mg Oral TID    cyanocobalamin  1,000 mcg Oral Daily    diazepam  10 mg Oral TID    diclofenac  4 g Topical 4x Daily    hydrochlorothiazide  25 mg Oral Daily    lidocaine  2 patch Transdermal Q24H    losartan  50 mg Oral Daily    magnesium chloride  535 mg Oral BID    menthol   Transdermal Q8H    oxyCODONE  10 mg Oral Q6H    pantoprazole  40 mg Oral Daily    psyllium  2 packet Oral Daily    senna-docusate  1 tablet Oral QPM    venlafaxine  150 mg Oral Daily    Vitamin D3  25 mcg Oral Daily       Data   Recent Labs   Lab 01/30/24  0712  01/29/24  0720 01/28/24  0621 01/26/24  0948 01/26/24  0442 01/25/24  2347 01/25/24  2303 01/25/24  2253   WBC 7.8 7.6  --   --   --   --   --  14.7*   HGB 15.2 14.3  --   --   --   --   --  15.1   MCV 90 91  --   --   --   --   --  89    194  --   --   --   --   --  176   *  --   --   --  135  --   --  129*   POTASSIUM 4.0 4.4 4.1   < > 4.0   < >  --  8.7*   CHLORIDE 97*  --   --   --  95*  --   --  94*   CO2 29  --   --   --  30*  --   --  27   BUN 15.9  --   --   --  23.1*  --   --  18.7   CR 0.86 1.00  --   --  0.95  --   --  0.80   ANIONGAP 8  --   --   --  10  --   --  8   JENNIFER 9.2  --   --   --  8.8  --   --  8.8   *  --   --   --  188*  --  177* 152*    < > = values in this interval not displayed.       No results found for this or any previous visit (from the past 24 hour(s)).

## 2024-01-30 NOTE — PLAN OF CARE
"Goal Outcome Evaluation:      Plan of Care Reviewed With: patient    A&Ox4.  Vitals monitored. On RA sats low to mid 90's.  Pain 2-3/10 at rest, increases to 10/10 with activity, sitting up.  Started on schedule PO Valium.  Declined scheduled oxycodone due to drowsiness from Valium, falling asleep during conversation.  On continuous pulse oximetry.  Unable to get OOB for nursing, attempted to sit up on edge without success due to increased pain.  Ambulated to  with PT, patient stated \"I screamed the whole way\".  Posterior neck dressing changed, old dried drainage, no new drainage, sutures intact.  Wearing soft collar at all times. Tolerating diet. Voiding. Pain Mgmt following.  Discharge when medically ready/pain controlled.                 "

## 2024-01-30 NOTE — PROGRESS NOTES
[facility] ACUTE PAIN SERVICE    ([facility], [facility], [facility], [facility])  Pain Progress Note    Assessment/Plan:  Lev Cabral is a 68 year old male who was admitted on 1/25/2024.  Pain Service is asked to see patient for Acute postop neck pain. Recent cervical laminoplasty.  Admitted for evaluation of worsening neck pain and left sided shoulder pain.  Patient is status post laminectomy of the cervical spine performed at [place] this past week after abnormal MRI showing severe stenosis with cord abnormality.    He had been discharged from hospital 1/23 to 1/24 and came to [facility] ED 1/16 ater developed severe pain in his neck.    Neurosurgery Consulted:    History of hypertension and spinal stenosis with a cervical C3-C6 laminoplasty on 1/24.      shows  1/25/2024 Oxycodone 5 mg  30 tablets in 5 days  01/25/2024 Diazepam 5 mg 25 tablets for 9 days  03/29/2023 Diazepam 10 mg 4 tablets for 4 days     Over the past 24 hours Alejo received:  3(10mg), 2(5mg) and  1(1mg) IV hydromorphone   In addition valium 10 mg oral tablet times one, then scheduled Robaxin 750 mg every 6 hours (2 doses), scheduled tylenol.           PLAN:  Acute post operative pain, incisional with musculoskeletal component, spasms.   Will schedule valium today and plan to transition to scheduled robaxin upon dismissal  Multimodal Medication Therapy:   Adjuvants: Tylenol 975 mg tid, lidocaine patch 2 patches, diclofenac gel qid, vistaril 25-50 mg every 6 hours prn, Valium 10 mg tid today, Add magnesium for spasms  Opioids: oxycodone 10 mg every 6 hours with oxycodone 5  mg every three hours prn   IV hydromorphone 0.5 mg tid prn for severe pain  Non-medication interventions- Ice prn  Constipation Prophylaxis- daily stool softener/laxative   Follow up /Discharge Recommendations - We recommend prescribing the following at the time of discharge:  oxycodone 5-10 mg every four hours prn, Robaxin 750 mg every 6 hours for  "5-7 days then prn, continue magnesium      Subjective:  Rests on back, minimal pain at rest.  Starting to be able to tolerate a little more movement in bed.  Last evening tried to stand up but pain was intolerable.    Feeling like he is making some progress.  No BM since last Wed.  Hoping to be able to walk to bathroom this AM.  Passing gas.              Acute post-operative pain   Patient Active Problem List   Diagnosis    CARDIOVASCULAR SCREENING; LDL GOAL LESS THAN 130    Family history of diabetes mellitus    Advanced directives, counseling/discussion    Cervicalgia    Acute reaction to stress    Peyronie's disease    Mass of chest wall, left    Chondrosarcoma of ribs (H)    Mild ascending aorta dilation (H24)    Acute post-operative pain        History   Drug Use No         Tobacco Use      Smoking status: Never        Passive exposure: Never      Smokeless tobacco: Never         acetaminophen  975 mg Oral TID    cyanocobalamin  1,000 mcg Oral Daily    diclofenac  4 g Topical 4x Daily    hydrochlorothiazide  25 mg Oral Daily    lidocaine  2 patch Transdermal Q24H    losartan  50 mg Oral Daily    magnesium chloride  535 mg Oral BID    menthol   Transdermal Q8H    methocarbamol  750 mg Oral Q6H    oxyCODONE  10 mg Oral Q6H    pantoprazole  40 mg Oral Daily    psyllium  2 packet Oral Daily    senna-docusate  1 tablet Oral QPM    venlafaxine  150 mg Oral Daily    Vitamin D3  25 mcg Oral Daily       Objective:  Vital signs in last 24 hours:  B/P: 136/68, T: 97.7, P: 84, R: 14   Blood pressure 136/68, pulse 84, temperature 97.7  F (36.5  C), temperature source Temporal, resp. rate 14, height 1.803 m (5' 11\"), weight 102.1 kg (225 lb), SpO2 96%.      Weight:   Wt Readings from Last 2 Encounters:   01/26/24 102.1 kg (225 lb)   01/08/24 106 kg (233 lb 9.6 oz)         Intake/Output:    Intake/Output Summary (Last 24 hours) at 1/30/2024 0730  Last data filed at 1/29/2024 1058  Gross per 24 hour   Intake --   Output 500 ml "   Net -500 ml        Review of Systems:   As per subjective, all others negative.    Physical Exam:     General Appearance:  Alert, cooperative, rests in bed, wife visiting.    Patient is moving slowly keeping head still, neck brace on   Head:  Normocephalic, without obvious abnormality, atraumatic   Eyes:  PERRL, conjunctiva/corneas clear, EOM's intact   ENT/Throat: Lips moist    Lymph/Neck: Supple, symmetrical, trachea midline   Lungs:   respirations unlabored   Abdomen:   Soft, non-tender, rounded   Musculoskeletal: Extremities normal, atraumatic   Neurologic: Alert and oriented X 3         Imaging:  Personally Reviewed.    No results found for this visit on 01/25/24.     Lab Results:  Personally Reviewed.   Last Comprehensive Metabolic Panel:  Sodium   Date Value Ref Range Status   01/26/2024 135 135 - 145 mmol/L Final     Comment:     Reference intervals for this test were updated on 09/26/2023 to more accurately reflect our healthy population. There may be differences in the flagging of prior results with similar values performed with this method. Interpretation of those prior results can be made in the context of the updated reference intervals.    10/16/2020 138 133 - 144 mmol/L Final     Potassium   Date Value Ref Range Status   01/29/2024 4.4 3.4 - 5.3 mmol/L Final   11/10/2022 4.4 3.4 - 5.3 mmol/L Final   10/16/2020 5.1 3.4 - 5.3 mmol/L Final     Chloride   Date Value Ref Range Status   01/26/2024 95 (L) 98 - 107 mmol/L Final   11/10/2022 108 94 - 109 mmol/L Final   10/16/2020 107 94 - 109 mmol/L Final     Carbon Dioxide   Date Value Ref Range Status   10/16/2020 26 20 - 32 mmol/L Final     Carbon Dioxide (CO2)   Date Value Ref Range Status   01/26/2024 30 (H) 22 - 29 mmol/L Final   11/10/2022 26 20 - 32 mmol/L Final     Anion Gap   Date Value Ref Range Status   01/26/2024 10 7 - 15 mmol/L Final   11/10/2022 5 3 - 14 mmol/L Final   10/16/2020 5 3 - 14 mmol/L Final     Glucose   Date Value Ref Range Status  "  01/26/2024 188 (H) 70 - 99 mg/dL Final   11/10/2022 107 (H) 70 - 99 mg/dL Final   10/16/2020 106 (H) 70 - 99 mg/dL Final     Comment:     Fasting specimen     GLUCOSE BY METER POCT   Date Value Ref Range Status   01/25/2024 177 (H) 70 - 99 mg/dL Final     Urea Nitrogen   Date Value Ref Range Status   01/26/2024 23.1 (H) 8.0 - 23.0 mg/dL Final   11/10/2022 17 7 - 30 mg/dL Final   10/16/2020 18 7 - 30 mg/dL Final     Creatinine   Date Value Ref Range Status   01/29/2024 1.00 0.67 - 1.17 mg/dL Final   10/16/2020 1.02 0.66 - 1.25 mg/dL Final     GFR Estimate   Date Value Ref Range Status   01/29/2024 81 >60 mL/min/1.73m2 Final   10/16/2020 76 >60 mL/min/[1.73_m2] Final     Comment:     Non  GFR Calc  Starting 12/18/2018, serum creatinine based estimated GFR (eGFR) will be   calculated using the Chronic Kidney Disease Epidemiology Collaboration   (CKD-EPI) equation.       Calcium   Date Value Ref Range Status   01/26/2024 8.8 8.8 - 10.2 mg/dL Final   10/16/2020 9.3 8.5 - 10.1 mg/dL Final        UA: No results found for: \"UAMP\", \"UBARB\", \"BENZODIAZEUR\", \"UCANN\", \"UCOC\", \"OPIT\", \"UPCP\"           MANAGEMENT DISCUSSED with the following over the past 24 hours: RN   NOTE(S)/MEDICAL RECORDS REVIEWED over the past 24 hours: Neurosurgery, Nursing  Medical complexity over the past 24 hours:  - Prescription DRUG MANAGEMENT performed      DEQUAN Brandt, DNP CNS  Acute Inpatient Pain Team  M-F   Paging via [employer] or [employer]         "

## 2024-01-31 ENCOUNTER — APPOINTMENT (OUTPATIENT)
Dept: PHYSICAL THERAPY | Facility: CLINIC | Age: 69
DRG: 947 | End: 2024-01-31
Payer: MEDICARE

## 2024-01-31 ENCOUNTER — APPOINTMENT (OUTPATIENT)
Dept: OCCUPATIONAL THERAPY | Facility: CLINIC | Age: 69
DRG: 947 | End: 2024-01-31
Attending: INTERNAL MEDICINE
Payer: MEDICARE

## 2024-01-31 LAB
ANION GAP SERPL CALCULATED.3IONS-SCNC: 8 MMOL/L (ref 7–15)
BUN SERPL-MCNC: 21.5 MG/DL (ref 8–23)
CALCIUM SERPL-MCNC: 9.3 MG/DL (ref 8.8–10.2)
CHLORIDE SERPL-SCNC: 95 MMOL/L (ref 98–107)
CREAT SERPL-MCNC: 1.07 MG/DL (ref 0.67–1.17)
DEPRECATED HCO3 PLAS-SCNC: 33 MMOL/L (ref 22–29)
EGFRCR SERPLBLD CKD-EPI 2021: 75 ML/MIN/1.73M2
ERYTHROCYTE [DISTWIDTH] IN BLOOD BY AUTOMATED COUNT: 12.2 % (ref 10–15)
GLUCOSE SERPL-MCNC: 111 MG/DL (ref 70–99)
HCT VFR BLD AUTO: 45.5 % (ref 40–53)
HGB BLD-MCNC: 15.7 G/DL (ref 13.3–17.7)
MCH RBC QN AUTO: 30.8 PG (ref 26.5–33)
MCHC RBC AUTO-ENTMCNC: 34.5 G/DL (ref 31.5–36.5)
MCV RBC AUTO: 89 FL (ref 78–100)
PLATELET # BLD AUTO: 210 10E3/UL (ref 150–450)
POTASSIUM SERPL-SCNC: 3.8 MMOL/L (ref 3.4–5.3)
RBC # BLD AUTO: 5.09 10E6/UL (ref 4.4–5.9)
SODIUM SERPL-SCNC: 136 MMOL/L (ref 135–145)
WBC # BLD AUTO: 7 10E3/UL (ref 4–11)

## 2024-01-31 PROCEDURE — 250N000013 HC RX MED GY IP 250 OP 250 PS 637: Performed by: HOSPITALIST

## 2024-01-31 PROCEDURE — 85027 COMPLETE CBC AUTOMATED: CPT | Performed by: INTERNAL MEDICINE

## 2024-01-31 PROCEDURE — 250N000013 HC RX MED GY IP 250 OP 250 PS 637: Performed by: CLINICAL NURSE SPECIALIST

## 2024-01-31 PROCEDURE — 97535 SELF CARE MNGMENT TRAINING: CPT | Mod: GO | Performed by: REHABILITATION PRACTITIONER

## 2024-01-31 PROCEDURE — 250N000013 HC RX MED GY IP 250 OP 250 PS 637: Performed by: NURSE PRACTITIONER

## 2024-01-31 PROCEDURE — 82374 ASSAY BLOOD CARBON DIOXIDE: CPT | Performed by: INTERNAL MEDICINE

## 2024-01-31 PROCEDURE — 120N000001 HC R&B MED SURG/OB

## 2024-01-31 PROCEDURE — 99232 SBSQ HOSP IP/OBS MODERATE 35: CPT | Performed by: INTERNAL MEDICINE

## 2024-01-31 PROCEDURE — 97530 THERAPEUTIC ACTIVITIES: CPT | Mod: GP | Performed by: PHYSICAL THERAPIST

## 2024-01-31 PROCEDURE — 250N000013 HC RX MED GY IP 250 OP 250 PS 637: Performed by: INTERNAL MEDICINE

## 2024-01-31 PROCEDURE — 97167 OT EVAL HIGH COMPLEX 60 MIN: CPT | Mod: GO | Performed by: REHABILITATION PRACTITIONER

## 2024-01-31 PROCEDURE — 97110 THERAPEUTIC EXERCISES: CPT | Mod: GP | Performed by: PHYSICAL THERAPIST

## 2024-01-31 PROCEDURE — 99232 SBSQ HOSP IP/OBS MODERATE 35: CPT | Performed by: NURSE PRACTITIONER

## 2024-01-31 PROCEDURE — 36415 COLL VENOUS BLD VENIPUNCTURE: CPT | Performed by: INTERNAL MEDICINE

## 2024-01-31 RX ORDER — OXYCODONE HYDROCHLORIDE 10 MG/1
10-20 TABLET ORAL
Status: DISCONTINUED | OUTPATIENT
Start: 2024-01-31 | End: 2024-02-01

## 2024-01-31 RX ORDER — LIDOCAINE 4 G/G
3 PATCH TOPICAL
Status: DISCONTINUED | OUTPATIENT
Start: 2024-01-31 | End: 2024-02-04 | Stop reason: HOSPADM

## 2024-01-31 RX ORDER — POLYETHYLENE GLYCOL 3350 17 G/17G
17 POWDER, FOR SOLUTION ORAL 2 TIMES DAILY
Status: DISCONTINUED | OUTPATIENT
Start: 2024-01-31 | End: 2024-02-04 | Stop reason: HOSPADM

## 2024-01-31 RX ORDER — METHOCARBAMOL 500 MG/1
500 TABLET, FILM COATED ORAL 3 TIMES DAILY
Status: DISCONTINUED | OUTPATIENT
Start: 2024-01-31 | End: 2024-02-04 | Stop reason: HOSPADM

## 2024-01-31 RX ORDER — ACETAMINOPHEN 500 MG
500 TABLET ORAL 4 TIMES DAILY PRN
Status: DISCONTINUED | OUTPATIENT
Start: 2024-01-31 | End: 2024-02-04 | Stop reason: HOSPADM

## 2024-01-31 RX ORDER — AMOXICILLIN 250 MG
2 CAPSULE ORAL 2 TIMES DAILY
Status: DISCONTINUED | OUTPATIENT
Start: 2024-01-31 | End: 2024-02-04 | Stop reason: HOSPADM

## 2024-01-31 RX ORDER — HYDROMORPHONE HYDROCHLORIDE 1 MG/ML
0.3 INJECTION, SOLUTION INTRAMUSCULAR; INTRAVENOUS; SUBCUTANEOUS
Status: DISCONTINUED | OUTPATIENT
Start: 2024-01-31 | End: 2024-02-02

## 2024-01-31 RX ORDER — OXYCODONE HYDROCHLORIDE 5 MG/1
5-10 TABLET ORAL
Status: DISCONTINUED | OUTPATIENT
Start: 2024-01-31 | End: 2024-01-31

## 2024-01-31 RX ORDER — AMOXICILLIN 250 MG
1 CAPSULE ORAL 2 TIMES DAILY
Status: DISCONTINUED | OUTPATIENT
Start: 2024-01-31 | End: 2024-02-04 | Stop reason: HOSPADM

## 2024-01-31 RX ORDER — ACETAMINOPHEN 500 MG
500 TABLET ORAL 4 TIMES DAILY
Status: DISCONTINUED | OUTPATIENT
Start: 2024-01-31 | End: 2024-02-01

## 2024-01-31 RX ADMIN — DIAZEPAM 10 MG: 5 TABLET ORAL at 14:00

## 2024-01-31 RX ADMIN — POLYETHYLENE GLYCOL 3350 17 G: 17 POWDER, FOR SOLUTION ORAL at 14:00

## 2024-01-31 RX ADMIN — OXYCODONE HYDROCHLORIDE 10 MG: 10 TABLET ORAL at 09:56

## 2024-01-31 RX ADMIN — Medication 535 MG: at 19:47

## 2024-01-31 RX ADMIN — DIAZEPAM 10 MG: 5 TABLET ORAL at 08:06

## 2024-01-31 RX ADMIN — PSYLLIUM HUSK 2 PACKET: 3.4 POWDER ORAL at 08:09

## 2024-01-31 RX ADMIN — ACETAMINOPHEN 975 MG: 325 TABLET, FILM COATED ORAL at 08:06

## 2024-01-31 RX ADMIN — Medication 5 MG: at 21:31

## 2024-01-31 RX ADMIN — METHOCARBAMOL 500 MG: 500 TABLET ORAL at 19:47

## 2024-01-31 RX ADMIN — Medication 535 MG: at 08:06

## 2024-01-31 RX ADMIN — Medication 25 MCG: at 08:07

## 2024-01-31 RX ADMIN — DIAZEPAM 10 MG: 5 TABLET ORAL at 19:47

## 2024-01-31 RX ADMIN — CYANOCOBALAMIN TAB 1000 MCG 1000 MCG: 1000 TAB at 08:07

## 2024-01-31 RX ADMIN — POLYETHYLENE GLYCOL 3350 17 G: 17 POWDER, FOR SOLUTION ORAL at 19:47

## 2024-01-31 RX ADMIN — SENNOSIDES AND DOCUSATE SODIUM 2 TABLET: 8.6; 5 TABLET ORAL at 19:47

## 2024-01-31 RX ADMIN — DICLOFENAC SODIUM TOPICAL GEL, 1% 4 G: 10 GEL TOPICAL at 12:26

## 2024-01-31 RX ADMIN — OXYCODONE HYDROCHLORIDE 10 MG: 10 TABLET ORAL at 03:28

## 2024-01-31 RX ADMIN — VENLAFAXINE HYDROCHLORIDE 150 MG: 37.5 CAPSULE, EXTENDED RELEASE ORAL at 08:06

## 2024-01-31 RX ADMIN — PANTOPRAZOLE SODIUM 40 MG: 40 TABLET, DELAYED RELEASE ORAL at 08:07

## 2024-01-31 RX ADMIN — DICLOFENAC SODIUM TOPICAL GEL, 1% 4 G: 10 GEL TOPICAL at 08:15

## 2024-01-31 RX ADMIN — ACETAMINOPHEN 500 MG: 500 TABLET, FILM COATED ORAL at 19:47

## 2024-01-31 RX ADMIN — ACETAMINOPHEN 975 MG: 325 TABLET, FILM COATED ORAL at 14:00

## 2024-01-31 RX ADMIN — HYDROXYZINE HYDROCHLORIDE 25 MG: 25 TABLET, FILM COATED ORAL at 14:00

## 2024-01-31 RX ADMIN — LIDOCAINE 2 PATCH: 4 PATCH TOPICAL at 08:11

## 2024-01-31 RX ADMIN — HYDROCHLOROTHIAZIDE 25 MG: 25 TABLET ORAL at 08:07

## 2024-01-31 RX ADMIN — SENNOSIDES AND DOCUSATE SODIUM 2 TABLET: 8.6; 5 TABLET ORAL at 13:59

## 2024-01-31 RX ADMIN — LIDOCAINE 1 PATCH: 4 PATCH TOPICAL at 16:33

## 2024-01-31 ASSESSMENT — ACTIVITIES OF DAILY LIVING (ADL)
ADLS_ACUITY_SCORE: 28
IADL_COMMENTS: SPOUSE TO COMPLETE AS NEEDED
ADLS_ACUITY_SCORE: 28

## 2024-01-31 NOTE — PROGRESS NOTES
"Pain Service Progress Note  [facility]  Telemedicine follow up  Date: 01/31/2024       Acute Inpatient Pain Service [place]  Coverage Monday-Friday 8:00-4:30  No weekend coverage contact house officer  [facility] P[facility]  Securely message with the Vocera Web Console (learn more here)    Patient Name: Lev Cabral  MRN: [id number]  Age: 68 year old  Sex: maleThe patient/surrogate has been notified of following:     \"This telephone/video visit will be conducted via a video-call/call between you and your physician/provider. We have found that certain health care needs can be provided without the need for a physical exam. This service lets us provide the care you need with a short phone conversation. If a prescription is necessary, we can send it directly to your pharmacy. If lab work is needed, we can place an order for that and you can then stop by our lab to have the test done at a later time.    Telephone/video visits are billed at different rates depending on your insurance coverage.?During this emergency period, for some insurers they may be billed the same as an in-person visit.? Please reach out to your insurance provider with any questions.  If during the course of the call the physician/provider feels a telephone visit is not appropriate, you will not be charged for this service.\"    Patient has given verbal consent to a Video/Telephone visit? Yes    Phone call duration: 25 minutes      Assessment:  Lev Cabral is a 68 year old male who was admitted on 1/25/2024.  Pain Service is asked to see patient for Acute postop neck pain for cervical done ointment  1/24/24 for cervical laminoplasty.  Admitted for evaluation of worsening neck pain and left sided shoulder pain.  Patient is status post laminectomy of the cervical spine performed at [place] this past week after abnormal MRI showing severe stenosis with cord abnormality.    He had been discharged from " hospital 1/23 to 1/24 and came to [facility] ED 1/16 ater developed severe pain in his neck.   Neurosurgery was consulted    Acute post operative pain, incisional with musculoskeletal component, spasms.   Will schedule valium today and plan to transition to scheduled robaxin upon dismissal  Multimodal Medication Therapy:   Adjuvants: Tylenol 975 mg tid, lidocaine patch 3 patches ( from 2 ) stop diclofenac gel has icy hot patches Q 8 prn vistaril 25-50 mg every 6 hours prn, Valium 10 mg tid  Add magnesium for spasms add methocarbamol ( Robaxin) 500 mg tid.consider adding Lyrica   Opioids: oxycodone 10-20 mg every 3 hours pen stop scheduled  oxycodone 10 mg every three hours  IV hydromorphone 0.5 mg tid prn for severe pain  Non-medication interventions- Ice prn  Constipation Prophylaxis- daily stool softener/laxative scheduled today  Follow up /Discharge Recommendations - We recommend prescribing the following at the time of discharge:  oxycodone 5-10 mg every four hours prn, Robaxin 500  mg every qid prnfor 5-7 days then prn, continue magnesium  Gwendolyn Casey, APRN CNP  01/31/2024     Overnight Events: none     Tubes/Drains: No    Subjective:   participating in therapies not much pain when laying down , about 1 week since last BM, some flatus, some bloating    weight of head makes spasms in upper back and incision. Arms are heavy denies weakness   Nausea: No  Vomiting: No  Pruritus: No  Symptoms of LAST: Yes no pain when lying in bed   Participation in therapies Yes    Pain Location:  Neck and upper back     Pain Intensity:    Pain at Rest: 2/10   Pain with Activity: 9/10  Comfort Goal: 0/10   Baseline Pain: 2/10   Satisfied with your level of pain control: No    Diet: Regular Diet Adult    Relevant Labs:  Recent Labs   Lab Test 01/31/24  0704 01/17/20  1307 12/01/17  0831   INR  --   --  1.10      < > 189   BUN 21.5   < > 12    < > = values in this interval not displayed.       Physical  "Exam:  Vitals: /40 (BP Location: Left arm, Patient Position: Right side, Cuff Size: Adult Regular)   Pulse 55   Temp 97.9  F (36.6  C) (Temporal)   Resp 18   Ht 1.803 m (5' 11\")   Wt 102.1 kg (225 lb)   SpO2 97%   BMI 31.38 kg/m      Physical Exam:   Orientation:  Sedated. oriented, and in no acute distress: Yes  Sedation: Yes  Respiratory: equal chest rise   Motor Examination: BARBOSA X 4   5/5 Strength in lower extremities: No  Psyche: affect  , cooperative, Pain behaviors No      Relevant Medications:  Current Pain Medications:  Medications related to Pain Management (From now, onward)      Start     Dose/Rate Route Frequency Ordered Stop    01/30/24 0900  diazepam (VALIUM) tablet 10 mg         10 mg Oral 3 TIMES DAILY 01/30/24 0840      01/30/24 0853  oxyCODONE (ROXICODONE) tablet 5 mg         5 mg Oral EVERY 3 HOURS PRN 01/30/24 0853      01/29/24 1600  oxyCODONE IR (ROXICODONE) tablet 10 mg         10 mg Oral EVERY 6 HOURS 01/29/24 1423      01/29/24 1418  HYDROmorphone (PF) (DILAUDID) injection 0.5 mg         0.5 mg Intravenous EVERY 3 HOURS PRN 01/29/24 1418      01/27/24 2000  senna-docusate (SENOKOT-S/PERICOLACE) 8.6-50 MG per tablet 1 tablet        Note to Pharmacy: PTA Sig:Take 1 tablet by mouth daily      1 tablet Oral EVERY EVENING 01/27/24 1530      01/27/24 1630  psyllium (METAMUCIL/KONSYL) Packet 2 packet        Note to Pharmacy: PTA Sig:Take 2 Tablespoonful by mouth daily      2 packet Oral DAILY 01/27/24 1530      01/26/24 0800  acetaminophen (TYLENOL) tablet 975 mg         975 mg Oral 3 TIMES DAILY 01/26/24 0155      01/26/24 0800  diclofenac (VOLTAREN) 1 % topical gel 4 g         4 g Topical 4 TIMES DAILY 01/26/24 0155      01/26/24 0600  Lidocaine (LIDOCARE) 4 % Patch 2 patch         2 patch  over 12 Hours Transdermal EVERY 24 HOURS 0800 01/26/24 0155      01/26/24 0155  senna-docusate (SENOKOT-S/PERICOLACE) 8.6-50 MG per tablet 1 tablet        See Hyperspace for full Linked Orders " Report.    1 tablet Oral 2 TIMES DAILY PRN 01/26/24 0155      01/26/24 0155  senna-docusate (SENOKOT-S/PERICOLACE) 8.6-50 MG per tablet 2 tablet        See Hyperspace for full Linked Orders Report.    2 tablet Oral 2 TIMES DAILY PRN 01/26/24 0155      01/26/24 0155  polyethylene glycol (MIRALAX) Packet 17 g         17 g Oral 2 TIMES DAILY PRN 01/26/24 0155      01/26/24 0154  hydrOXYzine HCl (ATARAX) tablet 25 mg        See Hyperspace for full Linked Orders Report.    25 mg Oral EVERY 6 HOURS PRN 01/26/24 0155      01/26/24 0000  diazepam (VALIUM) 5 MG tablet         2.5 mg Oral EVERY 6 HOURS PRN 01/26/24 0101 02/02/24 [id number]            Primary Service Contacted with Recommendations? Yes  Nurse contacted with recommendations ?yes     Please see A&P for additional details of medical decision making.  NOTE(S)/MEDICAL RECORDS REVIEWED over the past 24 hours: yes   Tests REVIEWED in the past 24 hours:  - BMP  - CBC  Medical complexity over the past 24 hours:  - Decision regarding ESCALATION OF LEVEL OF CARE  - high risk medication use and monitoring   35 MINUTES SPENT BY ME on the date of service doing chart review, history, exam, documentation & further activities per the note.      Tele-Visit Details    Type of service:  Video Visit    Video Start Time (time video started): 15:55    Video End Time (time video stopped): 16:25    Originating Location (pt. Location): Patient Hospital Room     Distant Location (provider location):  [facility]    Reason for Televisit: Pain follow up     Mode of Communication:  Video Conference via [email or web address]    Physician has received verbal consent for a video visit from the patient? Yes      DEQUAN Locke CNP       Acute Inpatient Pain Service [facility]   Hours of pain coverage 8:00-4:30 Monday.-Friday   No weekend coverage contact house officer  [employer] Paging/[facility] Pain  Securely message with the Vocera Web Console (learn more here)

## 2024-01-31 NOTE — PLAN OF CARE
Goal Outcome Evaluation:                      Pt unable to tolerate gravity against head - 10/10 pain with any position other than right side laying. Pt has relief of pain when gentle traction of skull is applied. OT and PT voiced concerns this am regarding pt's lack of improvement with sitting/standing up. Pt is motivated to work with therapies and expresses frustration of current situation. MD notified of concerns - awaiting orders/recommendations    Pain team follow up - medication changes  Wife at bedside  Continuous pulse ox    Day 8 of no BM - PRN senna x2, PRN Miralax  **Pain team adjusted bowel meds to be scheduled

## 2024-01-31 NOTE — PROGRESS NOTES
01/31/24 0821   Appointment Info   Signing Clinician's Name / Credentials (OT) Lucie Galo, OTR/L   Living Environment   People in Home spouse   Current Living Arrangements house   Home Accessibility no concerns   Transportation Anticipated family or friend will provide   Living Environment Comments Has FWW to use, walk in shower and spouse getting shower chair from her mom to use at home; pt had fall in bathroom previously. SHT   Self-Care   Usual Activity Tolerance good   Current Activity Tolerance poor   Regular Exercise Yes   Activity/Exercise Type walking;strength training;other (see comments)   Exercise Amount/Frequency daily   Equipment Currently Used at Home walker, standard   Fall history within last six months yes   Number of times patient has fallen within last six months 4   Activity/Exercise/Self-Care Comment Pt normally independent with all ADLs/IADLs, spouse available to assist. Pt is retired, spouse works part time   Instrumental Activities of Daily Living (IADL)   IADL Comments spouse to complete as needed   General Information   Onset of Illness/Injury or Date of Surgery 01/25/24   Referring Physician Kevin Awad MD   Patient/Family Therapy Goal Statement (OT) to end pain and return home   Additional Occupational Profile Info/Pertinent History of Current Problem Lev Cabral is a 68 year old male with a history of hypertension and spinal stenosis with a cervical C3-C6 laminoplasty on 1/24 who presents with uncontrolled postoperative pain and spasms. Neurosurgery saw the patient and recommended to continue with pain management and conservative approach. No surgery planned at this time. Neurosurgery signed off.   Existing Precautions/Restrictions fall;spinal  (soft cervical collar)   General Observations and Info pt in bed, agreed to try OOB, spouse present throughout session   Cognitive Status Examination   Orientation Status orientation to person, place and time   Pain  Assessment   Patient Currently in Pain Yes, see Vital Sign flowsheet  (pain escalates quickly from 0/10 to 10/10 with any attempt to transfer to sitting position)   Range of Motion Comprehensive   General Range of Motion no range of motion deficits identified   Strength Comprehensive (MMT)   Comment, General Manual Muscle Testing (MMT) Assessment appears intact, good B hand grasp, abl eto move all extreminites aganist gravity   Muscle Tone Assessment   Muscle Tone Quick Adds No deficits were identified   Coordination   Upper Extremity Coordination No deficits were identified   Bed Mobility   Comment (Bed Mobility) pt only tolerates slow rolling, limited ability to bridge hips, d/t fear of pain   Transfers   Transfer Comments unable to transition from sideline to sit EOB d/t increased pain   Activities of Daily Living   BADL Assessment/Intervention toileting;upper body dressing;bathing;lower body dressing   Upper Body Dressing Assessment/Training   Pratt Level (Upper Body Dressing) unable to assess   Lower Body Dressing Assessment/Training   Pratt Level (Lower Body Dressing) maximum assist (25% patient effort)   Toileting   Comment, (Toileting) has been unsing urinal in bed   Pratt Level (Toileting) unable to assess   Clinical Impression   Criteria for Skilled Therapeutic Interventions Met (OT) Yes, treatment indicated   OT Diagnosis decreased ADL/IADls   OT Problem List-Impairments impacting ADL activity tolerance impaired;mobility;pain;post-surgical precautions   Assessment of Occupational Performance 5 or more Performance Deficits   Identified Performance Deficits dsg, toileting, bathing, functional/community mobility, household chores, driving, errands, outdoor chores, recreational activity   Planned Therapy Interventions (OT) ADL retraining;progressive activity/exercise;transfer training   Clinical Decision Making Complexity (OT) problem focused assessment/low complexity   Risk & Benefits of  therapy have been explained evaluation/treatment results reviewed;care plan/treatment goals reviewed;risks/benefits reviewed;current/potential barriers reviewed;participants voiced agreement with care plan;participants included;spouse/significant other   Clinical Impression Comments Biggest limiting factor is pain with any attempts to transfer suideline to sit EOB.   OT Total Evaluation Time   OT Camille High Complexity Minutes (97679) 10   OT Goals   Therapy Frequency (OT) Daily   OT Predicted Duration/Target Date for Goal Attainment 02/07/24   OT Goals Hygiene/Grooming;Upper Body Dressing;Lower Body Dressing;Toilet Transfer/Toileting   OT: Hygiene/Grooming minimal assist;from wheelchair;within precautions   OT: Upper Body Dressing Minimal assist;from wheelchair;within precautions   OT: Lower Body Dressing Supervision/stand-by assist;within precautions   OT: Toilet Transfer/Toileting Minimal assist;toilet transfer;cleaning and garment management;using adaptive equipment;within precautions   OT Discharge Planning   OT Plan progress as able with OOB ADLs- g/h, dressing, toileting, shower transfer   OT Discharge Recommendation (DC Rec) home with assist   OT Rationale for DC Rec pt's biggest limiting factor to meeting goals is pain. Suspect once this has been managed, pt will meet needed goals for safe return home with spouse support for IADLs.   OT Brief overview of current status poor pain management, unable to tolerate sitting EOB, with extra time and grab bars, mod I with rolling, donned pants in supine and figure 4 position to don pants and socks   Total Session Time   Total Session Time (sum of timed and untimed services) 10

## 2024-01-31 NOTE — PLAN OF CARE
"Goal Outcome Evaluation:      Plan of Care Reviewed With: patient    Overall Patient Progress: improvingOverall Patient Progress: improving    Care continued: 1900 - 0730       Orientation: A/Ox4  /68 (BP Location: Right arm, Patient Position: Right side, Cuff Size: Adult Regular)   Pulse 54   Temp 97.1  F (36.2  C) (Temporal)   Resp 16   Ht 1.803 m (5' 11\")   Wt 102.1 kg (225 lb)   SpO2 95%   BMI 31.38 kg/m     O2: RA, continuous pulse ox  Pain: pain more controlled overnight, occasional spasms. Schedule valium, Tylenol and oxycodone given  Ambulation: Not OOB d/t pain  Voiding: using urinal, voiding adequately.  Skin: dressing on neck incision, small drainage.   Gtts: SL      Plan: home with wife        "

## 2024-02-01 ENCOUNTER — APPOINTMENT (OUTPATIENT)
Dept: GENERAL RADIOLOGY | Facility: CLINIC | Age: 69
DRG: 947 | End: 2024-02-01
Attending: INTERNAL MEDICINE
Payer: MEDICARE

## 2024-02-01 ENCOUNTER — APPOINTMENT (OUTPATIENT)
Dept: PHYSICAL THERAPY | Facility: CLINIC | Age: 69
DRG: 947 | End: 2024-02-01
Payer: MEDICARE

## 2024-02-01 ENCOUNTER — APPOINTMENT (OUTPATIENT)
Dept: GENERAL RADIOLOGY | Facility: CLINIC | Age: 69
DRG: 947 | End: 2024-02-01
Attending: PHYSICIAN ASSISTANT
Payer: MEDICARE

## 2024-02-01 ENCOUNTER — APPOINTMENT (OUTPATIENT)
Dept: OCCUPATIONAL THERAPY | Facility: CLINIC | Age: 69
DRG: 947 | End: 2024-02-01
Payer: MEDICARE

## 2024-02-01 LAB — POTASSIUM SERPL-SCNC: 3.8 MMOL/L (ref 3.4–5.3)

## 2024-02-01 PROCEDURE — 250N000013 HC RX MED GY IP 250 OP 250 PS 637: Performed by: INTERNAL MEDICINE

## 2024-02-01 PROCEDURE — 72040 X-RAY EXAM NECK SPINE 2-3 VW: CPT

## 2024-02-01 PROCEDURE — 84132 ASSAY OF SERUM POTASSIUM: CPT | Performed by: INTERNAL MEDICINE

## 2024-02-01 PROCEDURE — 97530 THERAPEUTIC ACTIVITIES: CPT | Mod: GP | Performed by: PHYSICAL THERAPIST

## 2024-02-01 PROCEDURE — 250N000011 HC RX IP 250 OP 636: Performed by: NURSE PRACTITIONER

## 2024-02-01 PROCEDURE — 71045 X-RAY EXAM CHEST 1 VIEW: CPT

## 2024-02-01 PROCEDURE — 250N000013 HC RX MED GY IP 250 OP 250 PS 637: Performed by: HOSPITALIST

## 2024-02-01 PROCEDURE — 250N000013 HC RX MED GY IP 250 OP 250 PS 637: Performed by: NURSE PRACTITIONER

## 2024-02-01 PROCEDURE — 99231 SBSQ HOSP IP/OBS SF/LOW 25: CPT | Performed by: PHYSICIAN ASSISTANT

## 2024-02-01 PROCEDURE — 99232 SBSQ HOSP IP/OBS MODERATE 35: CPT | Performed by: CLINICAL NURSE SPECIALIST

## 2024-02-01 PROCEDURE — 97110 THERAPEUTIC EXERCISES: CPT | Mod: GP | Performed by: PHYSICAL THERAPIST

## 2024-02-01 PROCEDURE — 250N000013 HC RX MED GY IP 250 OP 250 PS 637: Performed by: CLINICAL NURSE SPECIALIST

## 2024-02-01 PROCEDURE — 99232 SBSQ HOSP IP/OBS MODERATE 35: CPT | Performed by: INTERNAL MEDICINE

## 2024-02-01 PROCEDURE — 97535 SELF CARE MNGMENT TRAINING: CPT | Mod: GO | Performed by: REHABILITATION PRACTITIONER

## 2024-02-01 PROCEDURE — 36415 COLL VENOUS BLD VENIPUNCTURE: CPT | Performed by: INTERNAL MEDICINE

## 2024-02-01 PROCEDURE — 120N000001 HC R&B MED SURG/OB

## 2024-02-01 RX ORDER — ACETAMINOPHEN 325 MG/1
650 TABLET ORAL EVERY 6 HOURS
Status: DISCONTINUED | OUTPATIENT
Start: 2024-02-01 | End: 2024-02-04 | Stop reason: HOSPADM

## 2024-02-01 RX ORDER — AZITHROMYCIN 500 MG/5ML
500 INJECTION, POWDER, LYOPHILIZED, FOR SOLUTION INTRAVENOUS ONCE
Status: COMPLETED | OUTPATIENT
Start: 2024-02-02 | End: 2024-02-02

## 2024-02-01 RX ORDER — AZITHROMYCIN 250 MG/1
250 TABLET, FILM COATED ORAL EVERY 24 HOURS
Qty: 4 TABLET | Refills: 0 | Status: DISCONTINUED | OUTPATIENT
Start: 2024-02-02 | End: 2024-02-04 | Stop reason: HOSPADM

## 2024-02-01 RX ORDER — ACETAMINOPHEN 325 MG/1
975 TABLET ORAL EVERY 6 HOURS
Status: DISCONTINUED | OUTPATIENT
Start: 2024-02-01 | End: 2024-02-01

## 2024-02-01 RX ORDER — CEFTRIAXONE 2 G/1
2 INJECTION, POWDER, FOR SOLUTION INTRAMUSCULAR; INTRAVENOUS EVERY 24 HOURS
Status: DISCONTINUED | OUTPATIENT
Start: 2024-02-02 | End: 2024-02-04 | Stop reason: HOSPADM

## 2024-02-01 RX ORDER — DIAZEPAM 5 MG
5-10 TABLET ORAL 3 TIMES DAILY
Status: DISCONTINUED | OUTPATIENT
Start: 2024-02-01 | End: 2024-02-02

## 2024-02-01 RX ORDER — BISACODYL 10 MG
10 SUPPOSITORY, RECTAL RECTAL DAILY PRN
Status: DISCONTINUED | OUTPATIENT
Start: 2024-02-01 | End: 2024-02-04 | Stop reason: HOSPADM

## 2024-02-01 RX ORDER — OXYCODONE HYDROCHLORIDE 10 MG/1
10 TABLET ORAL
Status: DISCONTINUED | OUTPATIENT
Start: 2024-02-01 | End: 2024-02-02

## 2024-02-01 RX ADMIN — HYDROCHLOROTHIAZIDE 25 MG: 25 TABLET ORAL at 09:09

## 2024-02-01 RX ADMIN — DIAZEPAM 5 MG: 5 TABLET ORAL at 20:20

## 2024-02-01 RX ADMIN — Medication 25 MCG: at 09:09

## 2024-02-01 RX ADMIN — POLYETHYLENE GLYCOL 3350 17 G: 17 POWDER, FOR SOLUTION ORAL at 20:20

## 2024-02-01 RX ADMIN — MAGNESIUM HYDROXIDE 30 ML: 400 SUSPENSION ORAL at 14:38

## 2024-02-01 RX ADMIN — POLYETHYLENE GLYCOL 3350 17 G: 17 POWDER, FOR SOLUTION ORAL at 09:20

## 2024-02-01 RX ADMIN — METHOCARBAMOL 500 MG: 500 TABLET ORAL at 09:09

## 2024-02-01 RX ADMIN — SENNOSIDES AND DOCUSATE SODIUM 2 TABLET: 8.6; 5 TABLET ORAL at 09:07

## 2024-02-01 RX ADMIN — VENLAFAXINE HYDROCHLORIDE 150 MG: 37.5 CAPSULE, EXTENDED RELEASE ORAL at 09:07

## 2024-02-01 RX ADMIN — DIAZEPAM 5 MG: 5 TABLET ORAL at 13:13

## 2024-02-01 RX ADMIN — Medication 535 MG: at 20:21

## 2024-02-01 RX ADMIN — OXYCODONE HYDROCHLORIDE 10 MG: 10 TABLET ORAL at 14:38

## 2024-02-01 RX ADMIN — HYDROXYZINE HYDROCHLORIDE 25 MG: 25 TABLET, FILM COATED ORAL at 20:21

## 2024-02-01 RX ADMIN — HYDROMORPHONE HYDROCHLORIDE 0.3 MG: 1 INJECTION, SOLUTION INTRAMUSCULAR; INTRAVENOUS; SUBCUTANEOUS at 12:09

## 2024-02-01 RX ADMIN — OXYCODONE HYDROCHLORIDE 10 MG: 10 TABLET ORAL at 18:05

## 2024-02-01 RX ADMIN — PSYLLIUM HUSK 2 PACKET: 3.4 POWDER ORAL at 09:07

## 2024-02-01 RX ADMIN — CYANOCOBALAMIN TAB 1000 MCG 1000 MCG: 1000 TAB at 09:09

## 2024-02-01 RX ADMIN — DIAZEPAM 10 MG: 5 TABLET ORAL at 09:08

## 2024-02-01 RX ADMIN — SENNOSIDES AND DOCUSATE SODIUM 2 TABLET: 8.6; 5 TABLET ORAL at 20:20

## 2024-02-01 RX ADMIN — PANTOPRAZOLE SODIUM 40 MG: 40 TABLET, DELAYED RELEASE ORAL at 09:09

## 2024-02-01 RX ADMIN — LIDOCAINE 3 PATCH: 4 PATCH TOPICAL at 09:06

## 2024-02-01 RX ADMIN — ACETAMINOPHEN 650 MG: 325 TABLET, FILM COATED ORAL at 21:06

## 2024-02-01 RX ADMIN — Medication 535 MG: at 09:07

## 2024-02-01 RX ADMIN — ACETAMINOPHEN 500 MG: 500 TABLET, FILM COATED ORAL at 09:09

## 2024-02-01 RX ADMIN — Medication 5 MG: at 21:06

## 2024-02-01 RX ADMIN — OXYCODONE HYDROCHLORIDE 10 MG: 10 TABLET ORAL at 04:15

## 2024-02-01 RX ADMIN — BISACODYL 10 MG: 10 SUPPOSITORY RECTAL at 18:05

## 2024-02-01 RX ADMIN — ACETAMINOPHEN 650 MG: 325 TABLET, FILM COATED ORAL at 14:37

## 2024-02-01 RX ADMIN — OXYCODONE HYDROCHLORIDE 10 MG: 10 TABLET ORAL at 21:06

## 2024-02-01 ASSESSMENT — ACTIVITIES OF DAILY LIVING (ADL)
ADLS_ACUITY_SCORE: 28

## 2024-02-01 NOTE — PROGRESS NOTES
Hospitalist Medicine Progress Note   [facility]       Lev Cabral is a 68 year old gentleman with hypertension and spinal stenosis with the cervical C3-C6 laminoplasty on 1/24/2024 came to [facility] 1/25/2024 with severe neck pain associated with neck spasms.  He was evaluated by pain team and recommended to have oxycodone 10-20 mg every 3 hours pen stop scheduled  oxycodone 10 mg every three hours IV hydromorphone 0.5 mg tid prn for severe pain   Dr. Pepper from [facility] spine surgery team  can be reached at [phone] I called his team at [facility] on 2/1/2024 and they would like the patient's x-ray to be forwarded to them otherwise follow the plan of care here from the treating team       Date of Admission:  1/25/2024  Assessment & Plan     Postoperative cervical laminectomy pain  Patient initially had pain relief after surgery and was discharged from [facility] but developed pain after coming home it was significant because of which she came to the hospital  On treatment as advised by Pain team   X-ray of the neck done 2/1/2024 did not show any new changes other than the postoperative changes  Appreciate neurosurgery evaluation  Physical therapy did get up and walk the patient to the bathroom today    Hypoxia  Will check chest x-ray to rule out pneumonia    Hypokalemia -this has been replaced  Hyponatremia -improved with IV fluids  Hypertension-continue on losartan and hydrochlorothiazide  Obesity          Plan:   No new changes to pain medication  I did encourage the patient to get up and walk  CBC, chest x-ray to rule out pneumonia    Diet: Regular Diet Adult    DVT Prophylaxis: Low Risk/Ambulatory with no VTE prophylaxis indicated  Stoner Catheter: Not present  Code Status: Full Code               The patient's care was discussed with the Patient and Wife.    Doni Wagoner MD  Hospitalist Service  [facility]  Hospital    ______________________________________________________________________    Interval History     Symptoms   Pain in the neck is less than yesterday      Review of Systems:   He did sleep on melatonin  He is constipated since surgery    Data reviewed today: I reviewed all medications, new labs and imaging results over the last 24 hours.     Physical Exam   Vital Signs: Temp: 98.7  F (37.1  C) Temp src: Temporal BP: (!) 140/98 Pulse: 61   Resp: 18 SpO2: 96 % O2 Device: Nasal cannula Oxygen Delivery: 1 LPM  Weight: 225 lbs 0 oz      GENERAL: Patient is not in acute distress  HEENT: EOM+,Conjunctiva is clear   NECK: Patient is in neck collar  HEART: S1 S2 regular Rate and Rhythm, there is  no murmur,   LUNGS: Respirations are not laboured, Lungs have decreased breath sounds in the lung bases to auscultation without Crepitations or Wheezing   ABDOMEN: Soft, there is no tenderness, Bowel Sounds are  Positive   LOWER LIMBS: no Pedal Edema Bilaterally   CNS:  Alert,  Oriented x 3, Moving all the Four Limbs     Data   Recent Labs   Lab 02/01/24  0710 01/31/24  0704 01/30/24  0712 01/29/24  0720 01/26/24  0948 01/26/24  0442   WBC  --  7.0 7.8 7.6  --   --    HGB  --  15.7 15.2 14.3  --   --    MCV  --  89 90 91  --   --    PLT  --  210 192 194  --   --    NA  --  136 134*  --   --  135   POTASSIUM 3.8 3.8 4.0 4.4   < > 4.0   CHLORIDE  --  95* 97*  --   --  95*   CO2  --  33* 29  --   --  30*   BUN  --  21.5 15.9  --   --  23.1*   CR  --  1.07 0.86 1.00  --  0.95   ANIONGAP  --  8 8  --   --  10   JENNIFER  --  9.3 9.2  --   --  8.8   GLC  --  111* 106*  --   --  188*    < > = values in this interval not displayed.         Recent Results (from the past 24 hour(s))   XR Cervical Spine 2/3 Views    Narrative    XR CERVICAL SPINE 2/3 VIEWS 2/1/2024 1:01 PM     HISTORY: s/p laminoplasty    COMPARISON: 7/31/2014       Impression    IMPRESSION: Postsurgical changes of laminoplasty at C4 and C5.  Anterior fusion hardware spans  C5-C7.     KADE FLANNERY MD         SYSTEM ID:  [id number]

## 2024-02-01 NOTE — PROGRESS NOTES
Neurosurgery progress note    Neurosurgery was initially consulted on 1/26/2024, at that time patient's symptoms were improving, and signed off.    Patient has remained in the hospital since then, states he has not been able to get out of bed.  He reports significant posterior neck pain.  Denies pain into his upper extremities.  Prior to surgery states he is having difficulty with gait and balance, has not been up walking yet since being in the hospital.  He has not had any imaging since being in the hospital.  The postoperative x-ray was done at [facility], the images not available for review at this time.    Patient states that he feels like the weight of his head and the weight of his shoulder is pulling on his neck as well as exacerbating his pain.    Patient reports pain is starting to improve since working with the pain management team.    Exam    Alert, oriented, no acute distress, follows commands appropriate  Moving all extremities with 5 out of 5 strength  Cervical incision clean dry and intact, with nylon sutures in place  Wearing soft cervical collar    Assessment    Status post C3-6 laminoplasty, performed by Dr. Pepper at [facility] on 1/24/2024  Prior C3-4 and C4-5 fusion  Cervical myelopathy    Plan    Recommend the patient obtain a cervical x-ray today AP and lateral, we will follow-up on results  Hospitalist planning to call his primary surgical team at [place]  Pain management per pain team recommendations

## 2024-02-01 NOTE — PLAN OF CARE
Goal Outcome Evaluation:                      Continues to be unable to tolerate upright position  Wife at bedside  NeuroSurg consult completed - XR images acquired  Pain Team follow-up  Attempted to wean O2, unsuccessful - pt remains on 1L NC    Concerned for respiratory health - pt is holding back coughs d/t pain at base of skull. Independently using IS, poorly tolerated - wet secretions heard in nose/throat, current LS are clear/diminished    Constipation - Senna, Miralax, MOM given- PRN sup and enema available. Needs BM asap. Day 9 of no BM

## 2024-02-01 NOTE — PROGRESS NOTES
[facility] ACUTE PAIN SERVICE    ([facility], [facility], [facility], [facility])  Pain Progress Note    Assessment/Plan:  Lev Cabral is a 68 year old male who was admitted on 1/25/2024.  Pain Service is asked to see the patient for acute postop neck pain patient is status post cervical laminoplasty 1/24/24.  Admitted for evaluation of worsening neck pain and left sided shoulder pain.  Patient is status post laminectomy of the cervical spine performed at [facility] this past week after abnormal MRI showing severe stenosis with cord abnormality.    He had been discharged from hospital 1/23 to 1/24 and came to [facility] ED ater developed severe neck.   Neurosurgery was consulted and has evaluated patient.      Over the past 24 hours Alejo received:  2(10mg) oxycodone and 2 scheduled valium 10 mg dose and 1 prn vistaril.     Impression: Acute post operative pain, incisional with musculoskeletal component, spasms.   Plan:    Multimodal Medication Therapy:   Adjuvants: Tylenol 975 mg tid, lidocaine patch 3 patches, discontinue diclofenac gel has icy hot patches Q 8 prn vistaril 25-50 mg every 6 hours prn,   Valium 10 mg tid  Add magnesium for spasms add methocarbamol ( Robaxin) 500 mg tid.consider adding Lyrica   Opioids: oxycodone 10-20 mg every 3 hours prn   IV hydromorphone 0.5 mg tid prn for severe pain  Non-medication interventions- Ice prn  Constipation Prophylaxis- daily stool softener/laxative scheduled today  Follow up /Discharge Recommendations - We recommend prescribing the following at the time of discharge:  oxycodone 5-10 mg every four hours prn, Robaxin 500  mg every qid prnfor 5-7 days then prn, continue magnesium      Subjective:  Pain persists, got significant relief yesterday when PT provided some traction to his neck/head.  He was able to tolerate sitting up.  Difficult to know at this point how much relief he is getting from the valium  We discussed patient, RN, patient's wife  "and myself about holding robaxin today, trying the valium at reduced dose and will leave scheduled for now.    Also no BM since prior to surgery, does feel constipated.    Added bowel medications (fleets, dulcolax suppostory and MOM prn)            Acute post-operative pain   Patient Active Problem List   Diagnosis    CARDIOVASCULAR SCREENING; LDL GOAL LESS THAN 130    Family history of diabetes mellitus    Advanced directives, counseling/discussion    Cervicalgia    Acute reaction to stress    Peyronie's disease    Mass of chest wall, left    Chondrosarcoma of ribs (H)    Mild ascending aorta dilation (H24)    Acute post-operative pain        History   Drug Use No         Tobacco Use      Smoking status: Never        Passive exposure: Never      Smokeless tobacco: Never         acetaminophen  500 mg Oral 4x Daily    cyanocobalamin  1,000 mcg Oral Daily    diazepam  10 mg Oral TID    hydrochlorothiazide  25 mg Oral Daily    lidocaine  3 patch Transdermal Q24H    losartan  50 mg Oral Daily    magnesium chloride  535 mg Oral BID    melatonin  5 mg Oral At Bedtime    menthol   Transdermal Q8H    methocarbamol  500 mg Oral TID    pantoprazole  40 mg Oral Daily    polyethylene glycol  17 g Oral BID    psyllium  2 packet Oral Daily    senna-docusate  1 tablet Oral BID    Or    senna-docusate  2 tablet Oral BID    venlafaxine  150 mg Oral Daily    Vitamin D3  25 mcg Oral Daily       Objective:  Vital signs in last 24 hours:  B/P: 116/57, T: 96.8, P: 56, R: 18   Blood pressure 116/57, pulse 56, temperature 96.8  F (36  C), temperature source Temporal, resp. rate 18, height 1.803 m (5' 11\"), weight 102.1 kg (225 lb), SpO2 94%.      Weight:   Wt Readings from Last 2 Encounters:   01/26/24 102.1 kg (225 lb)   01/08/24 106 kg (233 lb 9.6 oz)         Intake/Output:    Intake/Output Summary (Last 24 hours) at 2/1/2024 0831  Last data filed at 1/31/2024 2132  Gross per 24 hour   Intake --   Output 400 ml   Net -400 ml    "     Review of Systems:   As per subjective, all others negative.    Physical Exam:     General Appearance:  Alert, rests on side, wife in room, supportive   Head:  Normocephalic, without obvious abnormality, atraumatic   Eyes:  PERRL, conjunctiva/corneas clear, EOM's intact   ENT/Throat: Lips moist, neck brace on   Lymph/Neck: Supple, symmetrical, trachea midline   Lungs:   respirations unlabored   Abdomen:   Soft, non-tender, non distended   Musculoskeletal: Extremities normal, atraumatic     Skin: Skin is intact    Neurologic: oriented X 3, Moves all 4 extremities         Imaging:  Personally Reviewed.    No results found for this visit on 01/25/24.     Lab Results:  Personally Reviewed.   Last Comprehensive Metabolic Panel:  Sodium   Date Value Ref Range Status   01/31/2024 136 135 - 145 mmol/L Final     Comment:     Reference intervals for this test were updated on 09/26/2023 to more accurately reflect our healthy population. There may be differences in the flagging of prior results with similar values performed with this method. Interpretation of those prior results can be made in the context of the updated reference intervals.    10/16/2020 138 133 - 144 mmol/L Final     Potassium   Date Value Ref Range Status   02/01/2024 3.8 3.4 - 5.3 mmol/L Final   11/10/2022 4.4 3.4 - 5.3 mmol/L Final   10/16/2020 5.1 3.4 - 5.3 mmol/L Final     Chloride   Date Value Ref Range Status   01/31/2024 95 (L) 98 - 107 mmol/L Final   11/10/2022 108 94 - 109 mmol/L Final   10/16/2020 107 94 - 109 mmol/L Final     Carbon Dioxide   Date Value Ref Range Status   10/16/2020 26 20 - 32 mmol/L Final     Carbon Dioxide (CO2)   Date Value Ref Range Status   01/31/2024 33 (H) 22 - 29 mmol/L Final   11/10/2022 26 20 - 32 mmol/L Final     Anion Gap   Date Value Ref Range Status   01/31/2024 8 7 - 15 mmol/L Final   11/10/2022 5 3 - 14 mmol/L Final   10/16/2020 5 3 - 14 mmol/L Final     Glucose   Date Value Ref Range Status   01/31/2024 111 (H)  70 - 99 mg/dL Final   11/10/2022 107 (H) 70 - 99 mg/dL Final   10/16/2020 106 (H) 70 - 99 mg/dL Final     Comment:     Fasting specimen     GLUCOSE BY METER POCT   Date Value Ref Range Status   01/25/2024 177 (H) 70 - 99 mg/dL Final     Urea Nitrogen   Date Value Ref Range Status   01/31/2024 21.5 8.0 - 23.0 mg/dL Final   11/10/2022 17 7 - 30 mg/dL Final   10/16/2020 18 7 - 30 mg/dL Final     Creatinine   Date Value Ref Range Status   01/31/2024 1.07 0.67 - 1.17 mg/dL Final   10/16/2020 1.02 0.66 - 1.25 mg/dL Final     GFR Estimate   Date Value Ref Range Status   01/31/2024 75 >60 mL/min/1.73m2 Final   10/16/2020 76 >60 mL/min/[1.73_m2] Final     Comment:     Non  GFR Calc  Starting 12/18/2018, serum creatinine based estimated GFR (eGFR) will be   calculated using the Chronic Kidney Disease Epidemiology Collaboration   (CKD-EPI) equation.       Calcium   Date Value Ref Range Status   01/31/2024 9.3 8.8 - 10.2 mg/dL Final   10/16/2020 9.3 8.5 - 10.1 mg/dL Final          MANAGEMENT DISCUSSED with the following over the past 24 hours: RN and MD   NOTE(S)/MEDICAL RECORDS REVIEWED over the past 24 hours: Nursing, Hospital Medicine, Pain Provider note, Rehab note  Medical complexity over the past 24 hours:  - Prescription DRUG MANAGEMENT performed  Plan for Neurosurgery and  [facility] to discuss possible further imaging      DEQUAN Brandt, DNP CNS  Acute Inpatient Pain Team  M-F   Paging via [employer] or [employer]

## 2024-02-01 NOTE — PLAN OF CARE
"Goal Outcome Evaluation:      Plan of Care Reviewed With: patient    Overall Patient Progress: improvingOverall Patient Progress: improving     Care continued: 1900 - 0730     Events this shift: N/A    Orientation: A/Ox4  /57 (BP Location: Right arm, Patient Position: Semi-Escudero's, Cuff Size: Adult Regular)   Pulse 56   Temp 96.8  F (36  C) (Temporal)   Resp 18   Ht 1.803 m (5' 11\")   Wt 102.1 kg (225 lb)   SpO2 94%   BMI 31.38 kg/m     O2: sats dropped to 88% on RA- 1L NC applied  Pain: Pain increased overnight, left hip started hurting as well. 10 mg PRN oxy given along with scheduled meds  Ambulation: Not OOB tonight.   Voiding: Using urinal, voiding adequately.   Skin: No issues- neck incision covered with island dressing, scant drainage.  Gtts: SL  Inspiratory wheezes heard upon auscultation around 0400, pt reported more mucous in throat. IS performed, LS clear and mucous cleared.    Plan: Spine surgery consult in AM, doctor to call surgeon from [place]         "

## 2024-02-01 NOTE — PROGRESS NOTES
Hospitalist Medicine Progress Note   [facility]       Lev Cabral is a 68 year old gentleman with hypertension and spinal stenosis with the cervical C3-C6 laminoplasty on 1/24/2024 came to [facility] 1/25/2024 with severe neck pain associated with neck spasms.  He was evaluated by pain team and recommended to have oxycodone 10-20 mg every 3 hours pen stop scheduled  oxycodone 10 mg every three hours IV hydromorphone 0.5 mg tid prn for severe pain   Dr. Pepper from [facility] spine surgery team  can be reached at [phone]        Date of Admission:  1/25/2024  Assessment & Plan     Postoperative cervical laminectomy pain  Patient initially had pain relief after surgery and was discharged from [facility] but developed pain after coming home it was significant because of which she came to the hospital    Hypokalemia -this has been replaced  Hyponatremia -improved with IV fluids  Hypertension-continue on losartan and hydrochlorothiazide  Obesity          Plan:   Melatonin 5 mg every day for sleeping  Schedule V 100 mg of acetaminophen 4 times daily and 500 mg 4 times as needed  Consult spine surgery  I did tell the patient that I would talk to Dr. Pepper from [facility] spine surgery in a.m.    Diet: Regular Diet Adult    DVT Prophylaxis: Low Risk/Ambulatory with no VTE prophylaxis indicated  Stoner Catheter: Not present  Code Status: Full Code               The patient's care was discussed with the Patient and Wife.    Doni Wagoner MD  Hospitalist Service  [facility]    ______________________________________________________________________    Interval History     Symptoms   Pain in the neck is 9/10 when he moves  He says he is unable to sit up beyond 45 degrees of ankle    Review of Systems:   He has not been sleeping well since surgery  He is constipated since surgery    Data reviewed today: I reviewed all medications, new labs and imaging  results over the last 24 hours.     Physical Exam   Vital Signs: Temp: 97.3  F (36.3  C) Temp src: Temporal BP: 113/80 Pulse: 52   Resp: 16 SpO2: 97 % O2 Device: None (Room air)    Weight: 225 lbs 0 oz      GENERAL: Patient is not in acute distress  HEENT: EOM+,Conjunctiva is clear   NECK: Patient is in neck collar  HEART: S1 S2 regular Rate and Rhythm, there is  no murmur,   LUNGS: Respirations are not laboured, Lungs are clear to auscultation without Crepitations or Wheezing   ABDOMEN: Soft, there is no tenderness, Bowel Sounds are  Positive   LOWER LIMBS: no Pedal Edema Bilaterally   CNS:  Alert,  Oriented x 3, Moving all the Four Limbs     Data   Recent Labs   Lab 01/31/24  0704 01/30/24  0712 01/29/24  0720 01/26/24  0948 01/26/24  0442   WBC 7.0 7.8 7.6  --   --    HGB 15.7 15.2 14.3  --   --    MCV 89 90 91  --   --     192 194  --   --     134*  --   --  135   POTASSIUM 3.8 4.0 4.4   < > 4.0   CHLORIDE 95* 97*  --   --  95*   CO2 33* 29  --   --  30*   BUN 21.5 15.9  --   --  23.1*   CR 1.07 0.86 1.00  --  0.95   ANIONGAP 8 8  --   --  10   JENNIFER 9.3 9.2  --   --  8.8   * 106*  --   --  188*    < > = values in this interval not displayed.         No results found for this or any previous visit (from the past 24 hour(s)).

## 2024-02-02 ENCOUNTER — APPOINTMENT (OUTPATIENT)
Dept: OCCUPATIONAL THERAPY | Facility: CLINIC | Age: 69
DRG: 947 | End: 2024-02-02
Payer: MEDICARE

## 2024-02-02 ENCOUNTER — APPOINTMENT (OUTPATIENT)
Dept: PHYSICAL THERAPY | Facility: CLINIC | Age: 69
DRG: 947 | End: 2024-02-02
Payer: MEDICARE

## 2024-02-02 LAB
ERYTHROCYTE [DISTWIDTH] IN BLOOD BY AUTOMATED COUNT: 12.1 % (ref 10–15)
HCT VFR BLD AUTO: 46 % (ref 40–53)
HGB BLD-MCNC: 15.7 G/DL (ref 13.3–17.7)
MCH RBC QN AUTO: 30.7 PG (ref 26.5–33)
MCHC RBC AUTO-ENTMCNC: 34.1 G/DL (ref 31.5–36.5)
MCV RBC AUTO: 90 FL (ref 78–100)
PLATELET # BLD AUTO: 222 10E3/UL (ref 150–450)
POTASSIUM SERPL-SCNC: 3.8 MMOL/L (ref 3.4–5.3)
RBC # BLD AUTO: 5.11 10E6/UL (ref 4.4–5.9)
WBC # BLD AUTO: 6.6 10E3/UL (ref 4–11)

## 2024-02-02 PROCEDURE — 85027 COMPLETE CBC AUTOMATED: CPT | Performed by: INTERNAL MEDICINE

## 2024-02-02 PROCEDURE — 250N000013 HC RX MED GY IP 250 OP 250 PS 637: Performed by: INTERNAL MEDICINE

## 2024-02-02 PROCEDURE — 97110 THERAPEUTIC EXERCISES: CPT | Mod: GP

## 2024-02-02 PROCEDURE — L0172 CERV COL SR FOAM 2PC PRE OTS: HCPCS

## 2024-02-02 PROCEDURE — 36415 COLL VENOUS BLD VENIPUNCTURE: CPT | Performed by: INTERNAL MEDICINE

## 2024-02-02 PROCEDURE — 258N000003 HC RX IP 258 OP 636: Performed by: HOSPITALIST

## 2024-02-02 PROCEDURE — 97530 THERAPEUTIC ACTIVITIES: CPT | Mod: GP

## 2024-02-02 PROCEDURE — 99207 PR NO BILLABLE SERVICE THIS VISIT: CPT | Performed by: NURSE PRACTITIONER

## 2024-02-02 PROCEDURE — 250N000013 HC RX MED GY IP 250 OP 250 PS 637: Performed by: CLINICAL NURSE SPECIALIST

## 2024-02-02 PROCEDURE — 250N000013 HC RX MED GY IP 250 OP 250 PS 637: Performed by: HOSPITALIST

## 2024-02-02 PROCEDURE — 120N000001 HC R&B MED SURG/OB

## 2024-02-02 PROCEDURE — 250N000013 HC RX MED GY IP 250 OP 250 PS 637: Performed by: NURSE PRACTITIONER

## 2024-02-02 PROCEDURE — 99232 SBSQ HOSP IP/OBS MODERATE 35: CPT | Performed by: INTERNAL MEDICINE

## 2024-02-02 PROCEDURE — 97535 SELF CARE MNGMENT TRAINING: CPT | Mod: GO | Performed by: OCCUPATIONAL THERAPIST

## 2024-02-02 PROCEDURE — 97110 THERAPEUTIC EXERCISES: CPT | Mod: GO | Performed by: OCCUPATIONAL THERAPIST

## 2024-02-02 PROCEDURE — 99231 SBSQ HOSP IP/OBS SF/LOW 25: CPT | Performed by: PHYSICIAN ASSISTANT

## 2024-02-02 PROCEDURE — 250N000011 HC RX IP 250 OP 636: Performed by: HOSPITALIST

## 2024-02-02 PROCEDURE — 84132 ASSAY OF SERUM POTASSIUM: CPT | Performed by: INTERNAL MEDICINE

## 2024-02-02 RX ORDER — DIAZEPAM 5 MG
5 TABLET ORAL 3 TIMES DAILY
Status: DISCONTINUED | OUTPATIENT
Start: 2024-02-02 | End: 2024-02-04 | Stop reason: HOSPADM

## 2024-02-02 RX ORDER — OXYCODONE HYDROCHLORIDE 5 MG/1
5-10 TABLET ORAL EVERY 4 HOURS PRN
Status: DISCONTINUED | OUTPATIENT
Start: 2024-02-02 | End: 2024-02-04 | Stop reason: HOSPADM

## 2024-02-02 RX ADMIN — CYANOCOBALAMIN TAB 1000 MCG 1000 MCG: 1000 TAB at 09:38

## 2024-02-02 RX ADMIN — ACETAMINOPHEN 650 MG: 325 TABLET, FILM COATED ORAL at 15:39

## 2024-02-02 RX ADMIN — PSYLLIUM HUSK 2 PACKET: 3.4 POWDER ORAL at 09:39

## 2024-02-02 RX ADMIN — HYDROXYZINE HYDROCHLORIDE 25 MG: 25 TABLET, FILM COATED ORAL at 03:35

## 2024-02-02 RX ADMIN — ACETAMINOPHEN 650 MG: 325 TABLET, FILM COATED ORAL at 21:26

## 2024-02-02 RX ADMIN — Medication 535 MG: at 09:37

## 2024-02-02 RX ADMIN — AZITHROMYCIN MONOHYDRATE 500 MG: 500 INJECTION, POWDER, LYOPHILIZED, FOR SOLUTION INTRAVENOUS at 02:24

## 2024-02-02 RX ADMIN — Medication 5 MG: at 22:31

## 2024-02-02 RX ADMIN — AZITHROMYCIN DIHYDRATE 250 MG: 250 TABLET ORAL at 15:39

## 2024-02-02 RX ADMIN — CEFTRIAXONE 2 G: 2 INJECTION, POWDER, FOR SOLUTION INTRAMUSCULAR; INTRAVENOUS at 00:14

## 2024-02-02 RX ADMIN — DIAZEPAM 5 MG: 5 TABLET ORAL at 13:51

## 2024-02-02 RX ADMIN — PANTOPRAZOLE SODIUM 40 MG: 40 TABLET, DELAYED RELEASE ORAL at 09:38

## 2024-02-02 RX ADMIN — VENLAFAXINE HYDROCHLORIDE 150 MG: 37.5 CAPSULE, EXTENDED RELEASE ORAL at 09:37

## 2024-02-02 RX ADMIN — LIDOCAINE 3 PATCH: 4 PATCH TOPICAL at 09:44

## 2024-02-02 RX ADMIN — OXYCODONE HYDROCHLORIDE 10 MG: 10 TABLET ORAL at 03:36

## 2024-02-02 RX ADMIN — ACETAMINOPHEN 650 MG: 325 TABLET, FILM COATED ORAL at 03:35

## 2024-02-02 RX ADMIN — ACETAMINOPHEN 650 MG: 325 TABLET, FILM COATED ORAL at 09:37

## 2024-02-02 RX ADMIN — Medication 25 MCG: at 09:38

## 2024-02-02 RX ADMIN — Medication 535 MG: at 21:27

## 2024-02-02 RX ADMIN — POLYETHYLENE GLYCOL 3350 17 G: 17 POWDER, FOR SOLUTION ORAL at 21:27

## 2024-02-02 RX ADMIN — DIAZEPAM 5 MG: 5 TABLET ORAL at 21:26

## 2024-02-02 RX ADMIN — OXYCODONE HYDROCHLORIDE 10 MG: 10 TABLET ORAL at 00:14

## 2024-02-02 RX ADMIN — POLYETHYLENE GLYCOL 3350 17 G: 17 POWDER, FOR SOLUTION ORAL at 10:00

## 2024-02-02 RX ADMIN — DIAZEPAM 5 MG: 5 TABLET ORAL at 09:37

## 2024-02-02 RX ADMIN — SENNOSIDES AND DOCUSATE SODIUM 1 TABLET: 8.6; 5 TABLET ORAL at 21:25

## 2024-02-02 RX ADMIN — SENNOSIDES AND DOCUSATE SODIUM 1 TABLET: 8.6; 5 TABLET ORAL at 09:38

## 2024-02-02 ASSESSMENT — ACTIVITIES OF DAILY LIVING (ADL)
ADLS_ACUITY_SCORE: 28

## 2024-02-02 NOTE — PROGRESS NOTES
Neurosurgery progress note    Neurosurgery was initially consulted on 1/26/2024, at that time patient's symptoms were improving, and signed off.    Patient has remained in the hospital since then, states he has not been able to get out of bed.  He reports significant posterior neck pain.  Denies pain into his upper extremities.  Prior to surgery states he is having difficulty with gait and balance, has not been up walking yet since being in the hospital.     Patient underwent cervical x-ray yesterday which demonstrated expected appearance of laminoplasty hardware changes, the x-ray was performed lying down due to patient inability to sit upright.    Today patient has been able to get up out of bed a couple times with the help of physical therapy.  However he is really only able to do this if someone is providing support to his neck and lifting his head upwards with some slight traction pressure.  He states once he feels the weight of his head and his shoulders pulling on his neck he gets severe neck pain and has to lie down.    He is interested in trying a rigid cervical collar which may provide more support and comfort.    He is denying any new radicular symptoms.    Hospitalist was able to contact the patient's primary surgical team yesterday, they did not have any additional recommendations.    Exam    Alert, oriented, no acute distress, follows commands appropriate  Moving all extremities with 5 out of 5 strength  Elisabeth sign negative on the right, weakly positive on the left  Cervical incision clean dry and intact, with nylon sutures in place  Wearing soft cervical collar  Negative ankle clonus negative Babinski      Assessment    Status post C3-6 laminoplasty, performed by Dr. Pepper at [facility] on 1/24/2024  Prior C3-4 and C4-5 fusion  Cervical myelopathy    Plan    Recommend the patient obtain a rigid cervical collar to be worn as needed for comfort when out of bed.  If neck pain is persisting,  upright cervical flexion-extension x-rays may be beneficial to evaluate for instability that may be leading to his neck pain.    Pain management per pain team recommendations

## 2024-02-02 NOTE — PROGRESS NOTES
Hospitalist Medicine Progress Note   [facility]       Lev Cabral is a 68 year old gentleman with hypertension and spinal stenosis with the cervical C3-C6 laminoplasty on 1/24/2024 came to [facility] 1/25/2024 with severe neck pain associated with neck spasms.  He was evaluated by pain team and recommended to have oxycodone 10-20 mg every 3 hours pen stop scheduled  oxycodone 10 mg every three hours IV hydromorphone 0.5 mg tid prn for severe pain   Dr. Pepper from [facility] spine surgery team  can be reached at [phone] I called his team at [facility] on 2/1/2024 and they would like the patient's x-ray to be forwarded to them otherwise follow the plan of care here from the treating team       Date of Admission:  1/25/2024  Assessment & Plan     Postoperative cervical laminectomy pain  Patient initially had pain relief after surgery and was discharged from [facility] but developed pain after coming home it was significant because of which she came to the hospital  On treatment as advised by Pain team   X-ray of the neck done 2/1/2024 did not show any new changes other than the postoperative changes  Appreciate neurosurgery evaluation  Physical therapy did get up and walk the patient to the bathroom today    Hypoxia  Patient is off oxygen now    Hypokalemia -this has been replaced  Hyponatremia -improved with IV fluids  Hypertension-continue on losartan and hydrochlorothiazide  Obesity          Plan:   I did tell the patient that most probably he will be discharged tomorrow if the heart neck collar is brought today  I did encourage the patient to get up and walk      Diet: Regular Diet Adult    DVT Prophylaxis: Low Risk/Ambulatory with no VTE prophylaxis indicated  Stoner Catheter: Not present  Code Status: Full Code               The patient's care was discussed with the Patient and Wife.    Doni Wagoner MD  Hospitalist Service  [facility]  Hospital    ______________________________________________________________________    Interval History     Symptoms   Pain in the neck is less than yesterday    Review of Systems:   He did sleep on melatonin but says it was only 3 to 4 hours  He did have a bowel movement  He did move around though not much    Data reviewed today: I reviewed all medications, new labs and imaging results over the last 24 hours.     Physical Exam   Vital Signs: Temp: 97.6  F (36.4  C) Temp src: Temporal BP: 120/70 Pulse: 54   Resp: 16 SpO2: 92 % O2 Device: None (Room air) Oxygen Delivery: 1 LPM  Weight: 229 lbs 4.45 oz      GENERAL: Patient is not in acute distress  HEENT: EOM+,Conjunctiva is clear   NECK: Patient is in neck collar  HEART: S1 S2 regular Rate and Rhythm, there is  no murmur,   LUNGS: Respirations are not laboured, Lungs have decreased breath sounds in the lung bases to auscultation without Crepitations or Wheezing   ABDOMEN: Soft, there is no tenderness, Bowel Sounds are  Positive   LOWER LIMBS: no Pedal Edema Bilaterally   CNS:  Alert,  Oriented x 3, Moving all the Four Limbs     Data   Recent Labs   Lab 02/02/24  0555 02/01/24  0710 01/31/24  0704 01/30/24  0712 01/29/24  0720   WBC 6.6  --  7.0 7.8 7.6   HGB 15.7  --  15.7 15.2 14.3   MCV 90  --  89 90 91     --  210 192 194   NA  --   --  136 134*  --    POTASSIUM 3.8 3.8 3.8 4.0 4.4   CHLORIDE  --   --  95* 97*  --    CO2  --   --  33* 29  --    BUN  --   --  21.5 15.9  --    CR  --   --  1.07 0.86 1.00   ANIONGAP  --   --  8 8  --    JENNIFER  --   --  9.3 9.2  --    GLC  --   --  111* 106*  --          Recent Results (from the past 24 hour(s))   XR Chest Port 1 View    Narrative    EXAM: XR CHEST PORT 1 VIEW  LOCATION: [facility]  DATE: 2/1/2024    INDICATION: Decreased breath sounds in the lung bases with hypoxia  COMPARISON: 12/11/2017      Impression    IMPRESSION: Since 12/11/2017 the prior seen infiltrate/atelectasis with associated  left pleural effusion has resolved. There is some possible right lower lobe infiltrate/atelectasis posteriorly and I will recommend a two-view chest radiograph for further   evaluation of this region. Otherwise the chest is stable with again seen slightly tortuous and partially calcified thoracic aorta. Prior postoperative changes of the cervical spine.

## 2024-02-02 NOTE — PROVIDER NOTIFICATION
Provider notified via [email or web address]: Hi, Abnormal chest X ray: possible right lower lobe infiltrate/atelectasis posteriorly. Any new orders? thank you

## 2024-02-02 NOTE — PLAN OF CARE
Goal Outcome Evaluation:       Pt A&Ox4. Unable to tolerate upright position d/t increase pain. Pt reported that pain increases to 10/10 with upright position. Soft collar in place, dressing intact. CMS Intact. Pain managed with oxycodone. Given Suppository with small results. Saline locked. Voiding. VSS, on 1L O2. Lungs diminished. Congested cough present. Using IS. Orders in for portable chest X-ray

## 2024-02-02 NOTE — PROGRESS NOTES
[facility] ACUTE PAIN SERVICE CONSULTATION   [facility]   Text Page Pain Via [employer] Gwendolyn Cabral is a 68 year old male who was admitted on 1/25/2024.  I was asked to see the patient for poorly controlled pain and spasms following a cervical C3-6 laminoplasty on 1/24/2024.  Per chart review patient is doing fairly well today with continued complaints by the nurse report of his head feeling heavy and laying down in his neck causing pain and spasms.  Orthosis consult placed by neurosurgery to see if aspen hard collar may be of help.  Nurse reports patient is a little sedated has not used much opiates but has scheduled Valium 3 times daily.  Overall, per chart review appears patient is slowly improving and sedation is improving  Opioid use this past 24 hours= 73.5 mg equivalent in the form of oxycodone 50 mg, IV Dilaudid 0.3 mg.  Opioid risk= low (age, opiate naïve, postsurgical)    Plan:  Will not see patient today face-to-face and was requested  Medication changes for de-escalation  Oxycodone range dose 5 to 10 mg every 4 hours as needed.  Valium 2.5-5 times 3 times daily.  Firm cervical collar as advised by neurosurgery.    Recommendations for discharge close;  Oxycodone 5 mg every 4 hours as needed  Valium 2.5 mg to 5 mg 3 times daily as needed  IcyHot patches if patient finds beneficial every 8 hours as needed  If opioid use at discharge is greater than 50 morphine equivalents would consider intranasal Narcan.    ANN-MARIE AlmazanC,APRN,CNP,ACHPN  Secure messaging with vocera web Vocera Web   [facility] Paging/Directory Pain   [facility]  Hours 8-4:00 Monday-Friday after 3:30 contact hospital service covering provider

## 2024-02-02 NOTE — CONSULTS
"CLINICAL NUTRITION SERVICES  -  ASSESSMENT NOTE      Recommendations:   - Continue diet as ordered.     MALNUTRITION:  % Weight Loss:  None noted  % Intake:  Decreased intake does not meet criteria for malnutrition   Subcutaneous Fat Loss:  None observed   Muscle Loss:  None observed  Fluid Retention:  None noted or documented    Malnutrition Diagnosis: Patient does not meet two of the above criteria necessary for malnutrition          REASON FOR ASSESSMENT  Lev Cabral is a 68 year old male seen by Registered Dietitian for length of stay.    PMH of: Spinal stenosis with cervical C3-C6 laminoplasty on 1/24.     Admit 2/2: Post-op pain, spasms.      NUTRITION HISTORY  - Information obtained from patient and family in room.  - Diet at home: Regular.  - Barriers to PO intakes: Denies low appetite/intake PTA.  States he was eating at baseline without concerns.    - Allergies: NKFA.      CURRENT NUTRITION ORDERS  Diet Order:     Regular    Current Intake/Tolerance:  100% intakes based on flowsheet review since admission.  Some missed meals on some days during admit.      Patient reports appetite has not been an issues at all during admit.  He feels like he is eating well.  He does report constipation.       ANTHROPOMETRICS  Height: 5' 11\"  Weight: 225 lbs 0 oz  Body mass index is 31.38 kg/m .  Weight Status:  Obesity Grade I BMI 30-34.9  Weight History:  Wt Readings from Last 10 Encounters:   01/26/24 102.1 kg (225 lb)   01/08/24 106 kg (233 lb 9.6 oz)   11/10/22 99.1 kg (218 lb 8 oz)   08/23/22 97.5 kg (215 lb)   08/31/21 93.2 kg (205 lb 8 oz)   12/11/20 100.5 kg (221 lb 9.6 oz)   10/16/20 99.3 kg (219 lb)   01/17/20 97.4 kg (214 lb 12.8 oz)   11/27/18 99.9 kg (220 lb 4.8 oz)   11/21/18 99.3 kg (218 lb 14.4 oz)     - patient denies wt loss PTA.  Question if degree of scale differences above.    LABS: Reviewed.    MEDICATIONS: Reviewed.    GI: Stooling patterns noted.     SKIN: No current documentation of PI. "       ASSESSED NUTRITION NEEDS PER APPROVED PRACTICE GUIDELINES:    Dosing Weight 102 kg   Estimated Energy Needs: >/=20 Kcal/Kg  Justification: maintenance  Estimated Protein Needs: 1-1.2 g pro/Kg  Justification: preservation of lean body mass  Estimated Fluid Needs: per MD      NUTRITION DIAGNOSIS:  Predicted inadequate nutrient intake (energy/protein) related to potential for decline in PO intakes acutely pending LOS and appetite.      NUTRITION INTERVENTIONS  Recommendations / Nutrition Prescription  See above.    Implementation  Nutrition education: No education needs assessed at this time    Nutrition goals:  Patient to consume at least 75% of meals BID-TID on average.    MONITORING AND EVALUATION:  Progress towards goals will be monitored and evaluated per protocol and Practice Guidelines          Gwendolyn Rodriguez RDN, LD  Clinical Dietitian  3rd floor/ICU: [phone]  All other floors: [phone]  Weekend/holiday: [phone]  Office: [phone]

## 2024-02-02 NOTE — PLAN OF CARE
Patient vital signs are at baseline: No,  Reason:  needs 1L of O2  Patient able to ambulate as they were prior to admission or with assist devices provided by therapies during their stay:  No,  Reason:  was not OOB due to pain  Patient MUST void prior to discharge:  Yes  Patient able to tolerate oral intake:  Yes  Pain has adequate pain control using Oral analgesics:  Yes  Does patient have an identified :  Yes  Has goal D/C date and time been discussed with patient:  No,  Reason:  TBD    Pt is alert and oriented x4, pt has pain to back of neck and muscle spasms at rest 2/10 and 10/10 with little moves. Prn Oxycodone, prn Atarax, scheduled Valuim given. Pt started Zithromax, Rocephin for PNA, IS encouraged. Pt is voiding adequate amounts. Primopore dressing cdi to back of neck, pt has soft neck brace on.

## 2024-02-02 NOTE — PROGRESS NOTES
S: Patient was seen today at -624 for  Aspen cervical collar as ordered Dariusz Longoria PA-C.  DX: Laminoplasty    O:  Pt was fit with a [place] cervical collar. The anterior section was donned. The posterior section was donned and fastened to the front using the velcro straps. Attention was given to the chin support being sure that the correct height was set to support the chin. An extra padding set was also provided. Patient instructed on donning, doffing, use and care    A: An Aspen collar was provided and fit as required.     P: Patient will wear the collar at all times following Physician guidelines. Patient has been instructed to contact our facility with any questions and/or concerns    G: The objective/goal of the collar is to help reduce motion/give cervical stability.    Bhupinder BO

## 2024-02-03 ENCOUNTER — APPOINTMENT (OUTPATIENT)
Dept: OCCUPATIONAL THERAPY | Facility: CLINIC | Age: 69
DRG: 947 | End: 2024-02-03
Payer: MEDICARE

## 2024-02-03 ENCOUNTER — APPOINTMENT (OUTPATIENT)
Dept: PHYSICAL THERAPY | Facility: CLINIC | Age: 69
DRG: 947 | End: 2024-02-03
Payer: MEDICARE

## 2024-02-03 LAB — POTASSIUM SERPL-SCNC: 3.8 MMOL/L (ref 3.4–5.3)

## 2024-02-03 PROCEDURE — 99232 SBSQ HOSP IP/OBS MODERATE 35: CPT | Performed by: INTERNAL MEDICINE

## 2024-02-03 PROCEDURE — 250N000013 HC RX MED GY IP 250 OP 250 PS 637: Performed by: CLINICAL NURSE SPECIALIST

## 2024-02-03 PROCEDURE — 97535 SELF CARE MNGMENT TRAINING: CPT | Mod: GO

## 2024-02-03 PROCEDURE — 97530 THERAPEUTIC ACTIVITIES: CPT | Mod: GP | Performed by: PHYSICAL THERAPIST

## 2024-02-03 PROCEDURE — 97116 GAIT TRAINING THERAPY: CPT | Mod: GP | Performed by: PHYSICAL THERAPIST

## 2024-02-03 PROCEDURE — 250N000013 HC RX MED GY IP 250 OP 250 PS 637: Performed by: NURSE PRACTITIONER

## 2024-02-03 PROCEDURE — 250N000013 HC RX MED GY IP 250 OP 250 PS 637: Performed by: INTERNAL MEDICINE

## 2024-02-03 PROCEDURE — 120N000001 HC R&B MED SURG/OB

## 2024-02-03 PROCEDURE — 84132 ASSAY OF SERUM POTASSIUM: CPT | Performed by: INTERNAL MEDICINE

## 2024-02-03 PROCEDURE — 36415 COLL VENOUS BLD VENIPUNCTURE: CPT | Performed by: INTERNAL MEDICINE

## 2024-02-03 PROCEDURE — 250N000011 HC RX IP 250 OP 636: Performed by: HOSPITALIST

## 2024-02-03 PROCEDURE — 250N000013 HC RX MED GY IP 250 OP 250 PS 637: Performed by: HOSPITALIST

## 2024-02-03 RX ADMIN — SENNOSIDES AND DOCUSATE SODIUM 1 TABLET: 8.6; 5 TABLET ORAL at 20:40

## 2024-02-03 RX ADMIN — POLYETHYLENE GLYCOL 3350 17 G: 17 POWDER, FOR SOLUTION ORAL at 20:41

## 2024-02-03 RX ADMIN — LOSARTAN POTASSIUM 50 MG: 50 TABLET, FILM COATED ORAL at 08:50

## 2024-02-03 RX ADMIN — ACETAMINOPHEN 650 MG: 325 TABLET, FILM COATED ORAL at 03:12

## 2024-02-03 RX ADMIN — DIAZEPAM 2.5 MG: 5 TABLET ORAL at 20:40

## 2024-02-03 RX ADMIN — DIAZEPAM 5 MG: 5 TABLET ORAL at 08:49

## 2024-02-03 RX ADMIN — POLYETHYLENE GLYCOL 3350 17 G: 17 POWDER, FOR SOLUTION ORAL at 08:58

## 2024-02-03 RX ADMIN — LIDOCAINE 3 PATCH: 4 PATCH TOPICAL at 08:58

## 2024-02-03 RX ADMIN — SENNOSIDES AND DOCUSATE SODIUM 2 TABLET: 8.6; 5 TABLET ORAL at 08:50

## 2024-02-03 RX ADMIN — Medication 25 MCG: at 08:50

## 2024-02-03 RX ADMIN — Medication 5 MG: at 22:10

## 2024-02-03 RX ADMIN — ACETAMINOPHEN 650 MG: 325 TABLET, FILM COATED ORAL at 15:48

## 2024-02-03 RX ADMIN — AZITHROMYCIN DIHYDRATE 250 MG: 250 TABLET ORAL at 15:48

## 2024-02-03 RX ADMIN — Medication 535 MG: at 20:40

## 2024-02-03 RX ADMIN — OXYCODONE HYDROCHLORIDE 5 MG: 5 TABLET ORAL at 11:33

## 2024-02-03 RX ADMIN — CEFTRIAXONE 2 G: 2 INJECTION, POWDER, FOR SOLUTION INTRAMUSCULAR; INTRAVENOUS at 00:06

## 2024-02-03 RX ADMIN — VENLAFAXINE HYDROCHLORIDE 150 MG: 37.5 CAPSULE, EXTENDED RELEASE ORAL at 08:50

## 2024-02-03 RX ADMIN — DIAZEPAM 2.5 MG: 5 TABLET ORAL at 14:16

## 2024-02-03 RX ADMIN — ACETAMINOPHEN 650 MG: 325 TABLET, FILM COATED ORAL at 20:40

## 2024-02-03 RX ADMIN — CYANOCOBALAMIN TAB 1000 MCG 1000 MCG: 1000 TAB at 08:50

## 2024-02-03 RX ADMIN — HYDROCHLOROTHIAZIDE 25 MG: 25 TABLET ORAL at 08:50

## 2024-02-03 RX ADMIN — ACETAMINOPHEN 650 MG: 325 TABLET, FILM COATED ORAL at 08:49

## 2024-02-03 RX ADMIN — PSYLLIUM HUSK 2 PACKET: 3.4 POWDER ORAL at 08:50

## 2024-02-03 RX ADMIN — PANTOPRAZOLE SODIUM 40 MG: 40 TABLET, DELAYED RELEASE ORAL at 08:50

## 2024-02-03 RX ADMIN — Medication 535 MG: at 08:50

## 2024-02-03 ASSESSMENT — ACTIVITIES OF DAILY LIVING (ADL)
ADLS_ACUITY_SCORE: 27
ADLS_ACUITY_SCORE: 27
ADLS_ACUITY_SCORE: 28
ADLS_ACUITY_SCORE: 27
ADLS_ACUITY_SCORE: 28
ADLS_ACUITY_SCORE: 28
ADLS_ACUITY_SCORE: 27
ADLS_ACUITY_SCORE: 28
ADLS_ACUITY_SCORE: 27
ADLS_ACUITY_SCORE: 27
ADLS_ACUITY_SCORE: 28
ADLS_ACUITY_SCORE: 27

## 2024-02-03 NOTE — PROGRESS NOTES
Hospitalist Medicine Progress Note   [facility]       Lev Cabral is a 68 year old gentleman with hypertension and spinal stenosis with the cervical C3-C6 laminoplasty on 1/24/2024 came to [facility] 1/25/2024 with severe neck pain associated with neck spasms.  He was evaluated by pain team and recommended to have oxycodone 10-20 mg every 3 hours pen stop scheduled  oxycodone 10 mg every three hours IV hydromorphone 0.5 mg tid prn for severe pain   Dr. Pepper from [facility] spine surgery team  can be reached at [phone] I called his team at [facility] on 2/1/2024 and they would like the patient's x-ray to be forwarded to them otherwise follow the plan of care here from the treating team       Date of Admission:  1/25/2024  Assessment & Plan     Postoperative cervical laminectomy pain  Patient initially had pain relief after surgery and was discharged from [facility] but developed pain after coming home it was significant because of which she came to the hospital  On treatment as advised by Pain team   X-ray of the neck done 2/1/2024 did not show any new changes other than the postoperative changes  Appreciate neurosurgery evaluation  Physical therapy did get up and walk the patient in the hallways, on the stairs today.  As regards to discharge his wife is not comfortable unless he is able to get up and lay down on bed on his own.     Hypoxia  Right middle lobe pneumonia  Patient is off oxygen now    Hypokalemia -this has been replaced  Hyponatremia -improved with IV fluids  Hypertension-continue on losartan and hydrochlorothiazide  Obesity          Plan:   I did tell the patient that he will be discharged home tomorrow though his wife may not be agreeing to this idea as she tells me that will see what happens tomorrow  Discharge patient home in a.m. unless no new symptoms      Diet: Regular Diet Adult    DVT Prophylaxis: Low Risk/Ambulatory with no VTE  prophylaxis indicated  Stoner Catheter: Not present  Code Status: Full Code               The patient's care was discussed with the Patient and Wife.    Doni Wagoner MD  Hospitalist Service  [facility]    ______________________________________________________________________    Interval History     Symptoms   Pain in the neck is much less than yesterday    Review of Systems:   He did sleep well with melatonin but says it was only 3 to 4 hours  He did have a bowel movement today   He did move around though not much    Data reviewed today: I reviewed all medications, new labs and imaging results over the last 24 hours.     Physical Exam   Vital Signs: Temp: 97.3  F (36.3  C) Temp src: Temporal BP: 135/78 Pulse: 51   Resp: 16 SpO2: 93 % O2 Device: None (Room air)    Weight: 229 lbs 4.45 oz      GENERAL: Patient is not in acute distress  HEENT: EOM+,Conjunctiva is clear   NECK: Patient is in neck collar  HEART: S1 S2 regular Rate and Rhythm, there is  no murmur,   LUNGS: Respirations are not laboured, Lungs have decreased breath sounds in the lung bases to auscultation without Crepitations or Wheezing   ABDOMEN: Soft, there is no tenderness, Bowel Sounds are  Positive   LOWER LIMBS: no Pedal Edema Bilaterally   CNS:  Alert,  Oriented x 3, Moving all the Four Limbs     Data   Recent Labs   Lab 02/03/24  0648 02/02/24  0555 02/01/24  0710 01/31/24  0704 01/30/24  0712 01/29/24  0720   WBC  --  6.6  --  7.0 7.8 7.6   HGB  --  15.7  --  15.7 15.2 14.3   MCV  --  90  --  89 90 91   PLT  --  222  --  210 192 194   NA  --   --   --  136 134*  --    POTASSIUM 3.8 3.8 3.8 3.8 4.0 4.4   CHLORIDE  --   --   --  95* 97*  --    CO2  --   --   --  33* 29  --    BUN  --   --   --  21.5 15.9  --    CR  --   --   --  1.07 0.86 1.00   ANIONGAP  --   --   --  8 8  --    JENNIFER  --   --   --  9.3 9.2  --    GLC  --   --   --  111* 106*  --          No results found for this or any previous visit (from the past 24  hour(s)).

## 2024-02-03 NOTE — PROGRESS NOTES
IMPRESSION/REPORT/PLAN  #1. S/p C3-6 laminoplasty on 1/24/24 at [facility], Dr Pepper.   #2. Readmission at [facility] on 1/25/24 for severe neck pain.  Continue current pain regimen, working well, follow up with [facility] surgeons. No further needs from us at this time.     EXAM  Patient has bilateral upper extremities full strength, no focal deficits. Incision is clean, dry, intact. Took surgical dressing off, leave open to air. Sutures are intact incision without erythema or drainage.     SUBJECTIVE  No events overnight.  Patient reports incisional and posterior neck  pain improved with current pain regimen. Is voiding and having bowel movements. Ambulating with walker. Tolerating oral intake with no nausea or emesis. He does not endorse any pain, numbness, tingling or weakness in the extremities.  Will likely discharge tomorrow if he continues to improve the rest of the day.

## 2024-02-03 NOTE — PLAN OF CARE
Goal Outcome Evaluation:      Plan of Care Reviewed With: patient, spouse    Overall Patient Progress: improving    Pt A&O x4. Weaned to room air. PO scheduled valium managing pain-pain mostly w/ activity. CMS intact. Dressing changed. Up w/ Ax1, using gait belt, and walker. Fitted for aspen collar this afternoon-pt wearing when OOB. Pt feels this has helped his pain control better when ambulating. Voiding in good amts. Tolerating regular diet. Plan is home @ discharge.

## 2024-02-03 NOTE — PLAN OF CARE
Goal Outcome Evaluation:         .Patient vital signs are at baseline: Yes  Patient able to ambulate as they were prior to admission or with assist devices provided by therapies during their stay:  No,  Reason: Assist of 2 with gait belt and walker  Patient MUST void prior to discharge:  Yes  Patient able to tolerate oral intake:  Yes  Pain has adequate pain control using Oral analgesics:  Yes  Does patient have an identified :  Yes  Has goal D/C date and time been discussed with patient:  No,  Reason:  to be determined  Pt is alert and oriented , assist of 2 with GB and walker . Valium scheduled and seems to help , Voiding using urinal , Plan to discharge home when stable.

## 2024-02-04 ENCOUNTER — APPOINTMENT (OUTPATIENT)
Dept: PHYSICAL THERAPY | Facility: CLINIC | Age: 69
DRG: 947 | End: 2024-02-04
Payer: MEDICARE

## 2024-02-04 ENCOUNTER — APPOINTMENT (OUTPATIENT)
Dept: OCCUPATIONAL THERAPY | Facility: CLINIC | Age: 69
DRG: 947 | End: 2024-02-04
Payer: MEDICARE

## 2024-02-04 VITALS
WEIGHT: 229.28 LBS | BODY MASS INDEX: 32.1 KG/M2 | TEMPERATURE: 97.1 F | RESPIRATION RATE: 15 BRPM | SYSTOLIC BLOOD PRESSURE: 119 MMHG | HEIGHT: 71 IN | OXYGEN SATURATION: 98 % | HEART RATE: 55 BPM | DIASTOLIC BLOOD PRESSURE: 82 MMHG

## 2024-02-04 LAB — POTASSIUM SERPL-SCNC: 3.6 MMOL/L (ref 3.4–5.3)

## 2024-02-04 PROCEDURE — 97535 SELF CARE MNGMENT TRAINING: CPT | Mod: GO

## 2024-02-04 PROCEDURE — 36415 COLL VENOUS BLD VENIPUNCTURE: CPT | Performed by: INTERNAL MEDICINE

## 2024-02-04 PROCEDURE — 250N000013 HC RX MED GY IP 250 OP 250 PS 637: Performed by: NURSE PRACTITIONER

## 2024-02-04 PROCEDURE — 250N000013 HC RX MED GY IP 250 OP 250 PS 637: Performed by: CLINICAL NURSE SPECIALIST

## 2024-02-04 PROCEDURE — 250N000011 HC RX IP 250 OP 636: Performed by: HOSPITALIST

## 2024-02-04 PROCEDURE — 97530 THERAPEUTIC ACTIVITIES: CPT | Mod: GP | Performed by: PHYSICAL THERAPIST

## 2024-02-04 PROCEDURE — 99239 HOSP IP/OBS DSCHRG MGMT >30: CPT | Performed by: INTERNAL MEDICINE

## 2024-02-04 PROCEDURE — 84132 ASSAY OF SERUM POTASSIUM: CPT | Performed by: INTERNAL MEDICINE

## 2024-02-04 PROCEDURE — 250N000013 HC RX MED GY IP 250 OP 250 PS 637: Performed by: INTERNAL MEDICINE

## 2024-02-04 RX ORDER — CEFDINIR 300 MG/1
300 CAPSULE ORAL 2 TIMES DAILY
Qty: 8 CAPSULE | Refills: 0 | Status: SHIPPED | OUTPATIENT
Start: 2024-02-04 | End: 2024-02-08

## 2024-02-04 RX ORDER — ACETAMINOPHEN 500 MG
500 TABLET ORAL 4 TIMES DAILY PRN
COMMUNITY
Start: 2024-02-04

## 2024-02-04 RX ORDER — OXYCODONE HYDROCHLORIDE 5 MG/1
5 TABLET ORAL EVERY 4 HOURS PRN
Qty: 12 TABLET | Refills: 0 | Status: SHIPPED | OUTPATIENT
Start: 2024-02-04 | End: 2024-02-08

## 2024-02-04 RX ORDER — DIAZEPAM 5 MG
5 TABLET ORAL 3 TIMES DAILY
Qty: 12 TABLET | Refills: 0 | Status: SHIPPED | OUTPATIENT
Start: 2024-02-04 | End: 2024-02-08

## 2024-02-04 RX ORDER — AZITHROMYCIN 250 MG/1
250 TABLET, FILM COATED ORAL EVERY 24 HOURS
Qty: 1 TABLET | Refills: 0 | Status: SHIPPED | OUTPATIENT
Start: 2024-02-04 | End: 2024-02-05

## 2024-02-04 RX ORDER — ACETAMINOPHEN 325 MG/1
650 TABLET ORAL EVERY 8 HOURS PRN
COMMUNITY
Start: 2024-02-04 | End: 2024-02-08

## 2024-02-04 RX ADMIN — LOSARTAN POTASSIUM 50 MG: 50 TABLET, FILM COATED ORAL at 09:56

## 2024-02-04 RX ADMIN — PANTOPRAZOLE SODIUM 40 MG: 40 TABLET, DELAYED RELEASE ORAL at 09:56

## 2024-02-04 RX ADMIN — ACETAMINOPHEN 650 MG: 325 TABLET, FILM COATED ORAL at 03:36

## 2024-02-04 RX ADMIN — Medication 535 MG: at 09:56

## 2024-02-04 RX ADMIN — CYANOCOBALAMIN TAB 1000 MCG 1000 MCG: 1000 TAB at 09:56

## 2024-02-04 RX ADMIN — VENLAFAXINE HYDROCHLORIDE 150 MG: 37.5 CAPSULE, EXTENDED RELEASE ORAL at 09:56

## 2024-02-04 RX ADMIN — PSYLLIUM HUSK 2 PACKET: 3.4 POWDER ORAL at 09:56

## 2024-02-04 RX ADMIN — CEFTRIAXONE 2 G: 2 INJECTION, POWDER, FOR SOLUTION INTRAMUSCULAR; INTRAVENOUS at 00:15

## 2024-02-04 RX ADMIN — ACETAMINOPHEN 650 MG: 325 TABLET, FILM COATED ORAL at 09:30

## 2024-02-04 RX ADMIN — HYDROCHLOROTHIAZIDE 25 MG: 25 TABLET ORAL at 09:56

## 2024-02-04 RX ADMIN — Medication 25 MCG: at 09:56

## 2024-02-04 RX ADMIN — DIAZEPAM 2.5 MG: 5 TABLET ORAL at 09:30

## 2024-02-04 RX ADMIN — SENNOSIDES AND DOCUSATE SODIUM 2 TABLET: 8.6; 5 TABLET ORAL at 09:56

## 2024-02-04 ASSESSMENT — ACTIVITIES OF DAILY LIVING (ADL)
ADLS_ACUITY_SCORE: 27

## 2024-02-04 NOTE — PLAN OF CARE
Goal Outcome Evaluation:      Plan of Care Reviewed With: patient, spouse    Overall Patient Progress: improving    Pt A&O x4. PO oxycodone and scheduled tylenol and valium managing pain. CMS intact. Dressing CDI. Up w/ Ax1, using gait belt, and walker. Aspen collar on when OOB. Voiding in good amts. Tolerating regular diet. Plan is for possible discharge to home tomorrow.

## 2024-02-04 NOTE — PLAN OF CARE
Occupational Therapy Discharge Summary    Reason for therapy discharge:    All goals and outcomes met, no further needs identified.    Progress towards therapy goal(s). See goals on Care Plan in [facility] electronic health record for goal details.  Goals met    Therapy recommendation(s):    No further therapy is recommended.

## 2024-02-04 NOTE — PLAN OF CARE
Goal Outcome Evaluation:       .Patient vital signs are at baseline: Yes  Patient able to ambulate as they were prior to admission or with assist devices provided by therapies during their stay:  Yes  Patient MUST void prior to discharge:  Yes  Patient able to tolerate oral intake:  Yes  Pain has adequate pain control using Oral analgesics:  Yes, valium and tylenol scheduled on this shift  Does patient have an identified :  Yes  Has goal D/C date and time been discussed with patient:  No,  Reason:  To be determined.    Pt A&Ox4, scheduled valium and tylenol managing pain. CMS. Intact. Dressing CDI, walked to the bathroom assist of 1, Aspen collar on when OOB and soft collar in bed. Plan to discharge to home.

## 2024-02-04 NOTE — DISCHARGE SUMMARY
"[facility]  Hospitalist Discharge Summary      Date of Admission:  1/25/2024  Date of Discharge:  2/4/2024  Discharging Provider: Doni Wagoner MD  Discharge Service: Hospitalist Service    Discharge Diagnoses   Postoperative cervical laminectomy pain   Right middle lobe pneumonia  Acute hypoxic respiratory failure  Hypokalemia  Hyponatremia hypertension  Obesity  Spinal stenosis s/p C3-C6 laminectomy 1/24/2024 at [facility]      Clinically Significant Risk Factors     # Obesity: Estimated body mass index is 31.98 kg/m  as calculated from the following:    Height as of this encounter: 1.803 m (5' 11\").    Weight as of this encounter: 104 kg (229 lb 4.5 oz).       Follow-ups Needed After Discharge   Follow-up Appointments     Follow-up and recommended labs and tests       Follow up with primary care provider, Cr Corley, within 7 days   for hospital follow- up.  The following labs/tests are recommended: BMP   and CBC     Check CXR in 6-8 weeks to document resolution of Pneumonia in Right middle   lobe .            Discharge Disposition   Discharged to home  Condition at discharge: Stable    Hospital Course     Lev Cabral is a 68 year old gentleman with hypertension and spinal stenosis with the cervical C3-C6 laminoplasty on 1/24/2024 came to [facility] 1/25/2024 with severe neck pain associated with neck spasms.  He was evaluated by pain team and recommended to have oxycodone 10-20 mg every 3 hours pen stop scheduled  oxycodone 10 mg every three hours IV hydromorphone 0.5 mg tid prn for severe pain in the hospital which improved significantly after patient was treated with hard cervical collar, slept well with melatonin and moved around.  He did have a right middle lobe pneumonia with hypoxia and acute hypoxic respiratory failure requiring oxygen which has improved since patient was started on ceftriaxone and azithromycin 2/1/2024.  He will be given azithromycin " and cefdinir upon discharge to complete 5 and 7 days respectively of antibiotic therapy  Dr. Pepper from [facility] spine surgery team  can be reached at [phone] I called his team at [facility] on 2/1/2024 and they would like the patient's x-ray to be forwarded to them otherwise follow the plan of care here from the treating team    Consultations This Hospital Stay   CARE MANAGEMENT / SOCIAL WORK IP CONSULT  PHYSICAL THERAPY ADULT IP CONSULT  NEUROSURGERY IP CONSULT  PAIN MANAGEMENT ADULT IP CONSULT  OCCUPATIONAL THERAPY ADULT IP CONSULT  SPINE SURGERY ADULT IP CONSULT  ORTHOSIS BRACE IP CONSULT    Code Status   Full Code    Time Spent on this Encounter   I, Doni Wagoner MD, personally saw the patient today and spent greater than 30 minutes discharging this patient.       Doni Wagoner MD  [facility] ORTHO SPINE  [address] [facility] [address]  Phone: [phone]  Fax: [phone]  ______________________________________________________________________    Physical Exam   Vital Signs: Temp: 97.1  F (36.2  C) Temp src: Temporal BP: 119/82 Pulse: 55   Resp: 15 SpO2: 98 % O2 Device: None (Room air)    Weight: 229 lbs 4.45 oz    GENERAL: Patient is not in acute distress  HEENT: EOM+,Conjunctiva is clear   NECK: Patient is in neck collar  HEART: S1 S2 regular Rate and Rhythm, there is  no murmur,   LUNGS: Respirations are not laboured, Lungs have decreased breath sounds in the lung bases to auscultation without Crepitations or Wheezing   ABDOMEN: Soft, there is no tenderness, Bowel Sounds are  Positive   LOWER LIMBS: no Pedal Edema Bilaterally   CNS:  Alert,  Oriented x 3, Moving all the Four Limbs       Primary Care Physician   Cr Corley    Discharge Orders      Reason for your hospital stay    came to [facility] 1/25/2024 with severe neck pain associated with neck spasms     Follow-up and recommended labs and tests     Follow up with primary care  provider, Cr Corley, within 7 days for hospital follow- up.  The following labs/tests are recommended: BMP and CBC     Check CXR in 6-8 weeks to document resolution of Pneumonia in Right middle lobe .     Activity    Your activity upon discharge: activity as tolerated     Diet    Follow this diet upon discharge: Orders Placed This Encounter      Regular Diet Adult       Significant Results and Procedures   Most Recent 3 CBC's:  Recent Labs   Lab Test 02/02/24  0555 01/31/24  0704 01/30/24  0712   WBC 6.6 7.0 7.8   HGB 15.7 15.7 15.2   MCV 90 89 90    210 192     Most Recent 3 BMP's:  Recent Labs   Lab Test 02/04/24  0703 02/03/24  0648 02/02/24  0555 02/01/24  0710 01/31/24  0704 01/30/24  0712 01/29/24  0720 01/26/24  0948 01/26/24  0442   NA  --   --   --   --  136 134*  --   --  135   POTASSIUM 3.6 3.8 3.8   < > 3.8 4.0 4.4   < > 4.0   CHLORIDE  --   --   --   --  95* 97*  --   --  95*   CO2  --   --   --   --  33* 29  --   --  30*   BUN  --   --   --   --  21.5 15.9  --   --  23.1*   CR  --   --   --   --  1.07 0.86 1.00  --  0.95   ANIONGAP  --   --   --   --  8 8  --   --  10   JENNIFER  --   --   --   --  9.3 9.2  --   --  8.8   GLC  --   --   --   --  111* 106*  --   --  188*    < > = values in this interval not displayed.     Most Recent 2 LFT's:  Recent Labs   Lab Test 01/08/24  1021 08/31/21  0753   AST 31 28   ALT 33 34   ALKPHOS 79 81   BILITOTAL 0.6 0.7   ,   Results for orders placed or performed during the hospital encounter of 01/25/24   XR Cervical Spine 2/3 Views    Narrative    XR CERVICAL SPINE 2/3 VIEWS 2/1/2024 1:01 PM     HISTORY: s/p laminoplasty    COMPARISON: 7/31/2014       Impression    IMPRESSION: Postsurgical changes of laminoplasty at C4 and C5.  Anterior fusion hardware spans C5-C7.     KADE FLANNERY MD         SYSTEM ID:  [id number]   XR Chest Port 1 View    Narrative    EXAM: XR CHEST PORT 1 VIEW  LOCATION: [facility]  DATE: 2/1/2024    INDICATION:  Decreased breath sounds in the lung bases with hypoxia  COMPARISON: 12/11/2017      Impression    IMPRESSION: Since 12/11/2017 the prior seen infiltrate/atelectasis with associated left pleural effusion has resolved. There is some possible right lower lobe infiltrate/atelectasis posteriorly and I will recommend a two-view chest radiograph for further   evaluation of this region. Otherwise the chest is stable with again seen slightly tortuous and partially calcified thoracic aorta. Prior postoperative changes of the cervical spine.       Discharge Medications   Current Discharge Medication List        START taking these medications    Details   azithromycin (ZITHROMAX) 250 MG tablet Take 1 tablet (250 mg) by mouth every 24 hours for 1 day  Qty: 1 tablet, Refills: 0    Associated Diagnoses: Pneumonia of right middle lobe due to infectious organism      cefdinir (OMNICEF) 300 MG capsule Take 1 capsule (300 mg) by mouth 2 times daily for 4 days  Qty: 8 capsule, Refills: 0    Associated Diagnoses: Pneumonia of right middle lobe due to infectious organism      melatonin 5 MG tablet Take 1 tablet (5 mg) by mouth nightly as needed for sleep (sleep)  Qty: 30 tablet, Refills: 0    Associated Diagnoses: Acute post-operative pain           CONTINUE these medications which have CHANGED    Details   !! acetaminophen (TYLENOL) 325 MG tablet Take 2 tablets (650 mg) by mouth every 8 hours as needed for mild pain    Associated Diagnoses: Acute post-operative pain      !! acetaminophen (TYLENOL) 500 MG tablet Take 1 tablet (500 mg) by mouth 4 times daily as needed for mild pain    Associated Diagnoses: Acute post-operative pain      diazepam (VALIUM) 5 MG tablet Take 1 tablet (5 mg) by mouth 3 times daily  Qty: 12 tablet, Refills: 0    Associated Diagnoses: Acute post-operative pain      oxyCODONE (ROXICODONE) 5 MG tablet Take 1 tablet (5 mg) by mouth every 4 hours as needed for severe pain  Qty: 12 tablet, Refills: 0    Associated  Diagnoses: Acute post-operative pain       !! - Potential duplicate medications found. Please discuss with provider.        CONTINUE these medications which have NOT CHANGED    Details   hydrochlorothiazide (HYDRODIURIL) 25 MG tablet Take 1 tablet (25 mg) by mouth daily  Qty: 90 tablet, Refills: 0    Associated Diagnoses: Essential hypertension      losartan (COZAAR) 50 MG tablet Take 1 tablet (50 mg) by mouth daily  Qty: 90 tablet, Refills: 0    Associated Diagnoses: Essential hypertension      omeprazole (PRILOSEC) 20 MG DR capsule Take 20 mg by mouth daily      psyllium (METAMUCIL) 58.6 % POWD Take 2 Tablespoonful by mouth daily      senna-docusate (SENOKOT-S/PERICOLACE) 8.6-50 MG tablet Take 1 tablet by mouth daily      sildenafil (REVATIO) 20 MG tablet TAKE TWO TO FIVE TABLETS BY MOUTH 30 MINUTES TO 4 HOURS BEFORE SEX.  Qty: 30 tablet, Refills: 2    Associated Diagnoses: Erectile dysfunction, unspecified erectile dysfunction type      venlafaxine (EFFEXOR XR) 150 MG 24 hr capsule TAKE 1 CAPSULE BY MOUTH EVERY DAY  Qty: 90 capsule, Refills: 1    Associated Diagnoses: Adjustment disorder with mixed anxiety and depressed mood      vitamin B-12 (CYANOCOBALAMIN) 2500 MCG sublingual tablet Take 1,000 mcg by mouth daily      vitamin C (ASCORBIC ACID) 1000 MG TABS Take 1,000 mg by mouth daily      VITAMIN D, CHOLECALCIFEROL, PO Take 1,000 Units by mouth daily           STOP taking these medications       HYDROmorphone (DILAUDID) 2 MG tablet Comments:   Reason for Stopping:             Allergies   No Known Allergies

## 2024-02-04 NOTE — PROGRESS NOTES
Physical Therapy Discharge Summary    Reason for therapy discharge:    Discharged to home.    Progress towards therapy goal(s). See goals on Care Plan in [facility] electronic health record for goal details.  Goals partially met.  Barriers to achieving goals:   discharge from facility.    Therapy recommendation(s):    Continue home exercise program.

## 2024-02-04 NOTE — PLAN OF CARE
Goal Outcome Evaluation:      Plan of Care Reviewed With: patient, spouse    Overall Patient Progress: improving    Pt A&O x4. PO scheduled valium and tylenol managing pain. CMS intact. Incision CDI. Up w/ SBA, using walker. Family @ bedside. [place] collar on when OOB. Voiding in good amts. Tolerating regular diet.     Reviewed discharge instructions with patient and spouse. Questions answered. Patient discharged to home via spouse with scripts, discharge instructions, and belongings.

## 2024-02-04 NOTE — PROGRESS NOTES
Neurosurgery will sign off on this patient. No follow up with our department, he should follow up with his surgeon per their post op directions. We are available for questions should they arise.

## 2024-02-06 ENCOUNTER — PATIENT OUTREACH (OUTPATIENT)
Dept: CARE COORDINATION | Facility: CLINIC | Age: 69
End: 2024-02-06
Payer: MEDICARE

## 2024-02-06 NOTE — PROGRESS NOTES
"[facility] offered Clinic Care Coordination to an established [facility] eligible patient and patient declined CCC at this time.    Clinic Care Coordination Contact  [facility]: Post-Discharge Note  SITUATION                                                      Admission:    Admission Date: 01/25/24   Reason for Admission: Neck pain, Arm pain  Discharge:   Discharge Date: 02/04/24  Discharge Diagnosis: Postoperative cervical laminectomy pain, Right middle lobe pneumonia, Acute hypoxic respiratory failure, Hypokalemia, Hyponatremia hypertension, Obesity, Spinal stenosis s/p C3-C6 laminectomy 1/24/2024 at [facility]    BACKGROUND                                                      Per hospital discharge summary and inpatient provider notes:    Lev Cabral is a 68 year old male who presents with severe neck and left-sided shoulder pain.  Patient is a 68-year-old male with a recent complicated medical history that includes laminectomy of the cervical spine.  This was performed at [facility] after MRI suggested cord abnormality singles and severe stenosis.  Patient was in the hospital from the 23rd to the 24th.  Was discharged today.  Was sent home and developed severe pain in his neck.  Patient called the clinic and was told to take 10 mg of oxycodone which she did.  Pain was severe and presents to the emergency room for assessment.  No weakness in the arm.  No chest pain or shortness of breath.  No fever.     ASSESSMENT      Discharge Assessment  How are you doing now that you are home?: \"Not too bad. It's been going well. I'm retired and my wife is partially retired and she can work from home so she has been helping me out here.\"  How are your symptoms? (Red Flag symptoms escalate to triage hotline per guidelines): Improved  Do you feel your condition is stable enough to be safe at home until your provider visit?: Yes  Does the patient have their discharge instructions? : Yes  Does the patient have questions regarding " their discharge instructions? : No  Were you started on any new medications or were there changes to any of your previous medications? : Yes  Does the patient have all of their medications?: Yes  Do you have questions regarding any of your medications? : No  Do you have all of your needed medical supplies or equipment (DME)?  (i.e. oxygen tank, CPAP, cane, etc.): Yes  Discharge follow-up appointment scheduled within 14 calendar days? : Yes  Discharge Follow Up Appointment Date: 02/08/24  Discharge Follow Up Appointment Scheduled with?: Primary Care Provider    Post-op ([facility] CTA Only)  If the patient had a surgery or procedure, do they have any questions for a nurse?: No    PLAN                                                      Outpatient Plan:      Follow-ups Needed After Discharge  Follow-up Appointments     Follow-up and recommended labs and tests       Follow up with primary care provider, Cr Corley, within 7 days   for hospital follow- up.  The following labs/tests are recommended: BMP   and CBC      Check CXR in 6-8 weeks to document resolution of Pneumonia in Right middle   lobe .      Future Appointments   Date Time Provider Department Center   2/8/2024  9:00 AM Cr Corley PA-C [facility]         For any urgent concerns, please contact our 24 hour nurse triage line: [phone] ([phone])         JEREL Hill  [phone]  [facility]

## 2024-02-08 ENCOUNTER — OFFICE VISIT (OUTPATIENT)
Dept: FAMILY MEDICINE | Facility: CLINIC | Age: 69
End: 2024-02-08
Payer: MEDICARE

## 2024-02-08 VITALS
RESPIRATION RATE: 16 BRPM | BODY MASS INDEX: 32.37 KG/M2 | HEART RATE: 60 BPM | TEMPERATURE: 97.5 F | DIASTOLIC BLOOD PRESSURE: 80 MMHG | WEIGHT: 231.2 LBS | SYSTOLIC BLOOD PRESSURE: 147 MMHG | HEIGHT: 71 IN

## 2024-02-08 DIAGNOSIS — Z09 HOSPITAL DISCHARGE FOLLOW-UP: Primary | ICD-10-CM

## 2024-02-08 DIAGNOSIS — J18.9 PNEUMONIA OF RIGHT LOWER LOBE DUE TO INFECTIOUS ORGANISM: ICD-10-CM

## 2024-02-08 DIAGNOSIS — I10 ESSENTIAL HYPERTENSION: ICD-10-CM

## 2024-02-08 LAB
ANION GAP SERPL CALCULATED.3IONS-SCNC: 9 MMOL/L (ref 7–15)
BUN SERPL-MCNC: 24.2 MG/DL (ref 8–23)
CALCIUM SERPL-MCNC: 9.5 MG/DL (ref 8.8–10.2)
CHLORIDE SERPL-SCNC: 106 MMOL/L (ref 98–107)
CREAT SERPL-MCNC: 0.86 MG/DL (ref 0.67–1.17)
DEPRECATED HCO3 PLAS-SCNC: 26 MMOL/L (ref 22–29)
EGFRCR SERPLBLD CKD-EPI 2021: >90 ML/MIN/1.73M2
ERYTHROCYTE [DISTWIDTH] IN BLOOD BY AUTOMATED COUNT: 12.6 % (ref 10–15)
GLUCOSE SERPL-MCNC: 98 MG/DL (ref 70–99)
HCT VFR BLD AUTO: 41.2 % (ref 40–53)
HGB BLD-MCNC: 14 G/DL (ref 13.3–17.7)
MCH RBC QN AUTO: 30.2 PG (ref 26.5–33)
MCHC RBC AUTO-ENTMCNC: 34 G/DL (ref 31.5–36.5)
MCV RBC AUTO: 89 FL (ref 78–100)
PLATELET # BLD AUTO: 250 10E3/UL (ref 150–450)
POTASSIUM SERPL-SCNC: 3.9 MMOL/L (ref 3.4–5.3)
RBC # BLD AUTO: 4.63 10E6/UL (ref 4.4–5.9)
SODIUM SERPL-SCNC: 141 MMOL/L (ref 135–145)
WBC # BLD AUTO: 6 10E3/UL (ref 4–11)

## 2024-02-08 PROCEDURE — 36415 COLL VENOUS BLD VENIPUNCTURE: CPT | Performed by: PHYSICIAN ASSISTANT

## 2024-02-08 PROCEDURE — 85027 COMPLETE CBC AUTOMATED: CPT | Performed by: PHYSICIAN ASSISTANT

## 2024-02-08 PROCEDURE — 80048 BASIC METABOLIC PNL TOTAL CA: CPT | Performed by: PHYSICIAN ASSISTANT

## 2024-02-08 PROCEDURE — 99495 TRANSJ CARE MGMT MOD F2F 14D: CPT | Performed by: PHYSICIAN ASSISTANT

## 2024-02-08 RX ORDER — ACETAMINOPHEN 500 MG
500-1000 TABLET ORAL EVERY 6 HOURS PRN
COMMUNITY
Start: 2024-02-08 | End: 2024-02-08

## 2024-02-08 RX ORDER — POLYETHYLENE GLYCOL 3350 17 G/17G
17 POWDER, FOR SOLUTION ORAL
COMMUNITY
Start: 2024-01-25

## 2024-02-08 RX ORDER — LOSARTAN POTASSIUM 50 MG/1
25 TABLET ORAL DAILY
Start: 2024-02-08 | End: 2024-04-17

## 2024-02-08 NOTE — PROGRESS NOTES
"  Assessment & Plan     Hospital discharge follow-up  Essential hypertension  Pneumonia of right lower lobe due to infectious organism  All things considered Alejo is doing pretty well. His pain is much more controlled at this point. We removed his sutures today without complication. His at home hygiene was reviewed. He feels comfortable with that process. His lungs are clear and he'll finish abx tonight. Repeat cxr in 6-12 weeks, ordered. For the BP he did not take any medication today, and with lower readings, I'm only going to restart the losartan. Close follow up   - CBC with platelets; Future  - Basic metabolic panel  (Ca, Cl, CO2, Creat, Gluc, K, Na, BUN); Future  - XR Chest 2 Views; Future  - CBC with platelets  - Basic metabolic panel  (Ca, Cl, CO2, Creat, Gluc, K, Na, BUN)  - losartan (COZAAR) 50 MG tablet; Take 0.5 tablets (25 mg) by mouth daily      MED REC REQUIRED  Post Medication Reconciliation Status:     BMI  Estimated body mass index is 32.25 kg/m  as calculated from the following:    Height as of this encounter: 1.803 m (5' 11\").    Weight as of this encounter: 104.9 kg (231 lb 3.2 oz).           Subjective   Alejo is a 68 year old, presenting for the following health issues:  Hospital F/U        2/8/2024     8:53 AM   Additional Questions   Roomed by Bhupinder NICHOLSON   Accompanied by Wife ( Ryann)         2/8/2024     8:53 AM   Patient Reported Additional Medications   Patient reports taking the following new medications none     HPI         2/6/2024    10:37 AM   Post Discharge Outreach   Admission Date 1/25/2024   Reason for Admission Neck pain, Arm pain   Discharge Date 2/4/2024   Discharge Diagnosis Postoperative cervical laminectomy pain, Right middle lobe pneumonia, Acute hypoxic respiratory failure, Hypokalemia, Hyponatremia hypertension, Obesity, Spinal stenosis s/p C3-C6 laminectomy 1/24/2024 at [facility]   How are you doing now that you are home? \"Not too bad. It's been going well. I'm retired " "and my wife is partially retired and she can work from home so she has been helping me out here.\"   How are your symptoms? (Red Flag symptoms escalate to triage hotline per guidelines) Improved   Do you feel your condition is stable enough to be safe at home until your provider visit? Yes   Does the patient have their discharge instructions?  Yes   Does the patient have questions regarding their discharge instructions?  No   Were you started on any new medications or were there changes to any of your previous medications?  Yes   Does the patient have all of their medications? Yes   Do you have questions regarding any of your medications?  No   Do you have all of your needed medical supplies or equipment (DME)?  (i.e. oxygen tank, CPAP, cane, etc.) Yes   Discharge follow-up appointment scheduled within 14 calendar days?  Yes   Discharge Follow Up Appointment Date 2/8/2024   Discharge Follow Up Appointment Scheduled with? Primary Care Provider     Hospital Follow-up Visit:    Hospital/Nursing Home/IP Rehab Facility: [facility]  Date of Admission: 1/25/2024  Date of Discharge: 2/4/2024  Reason(s) for Admission: Acute post Operative pain    Was your hospitalization related to COVID-19? No   Problems taking medications regularly:  None  Medication changes since discharge: None  Problems adhering to non-medication therapy:  None    Lev RILEY Marianna is a 68 year old male who presents today for hospital follow up   He had had a laminoplasty through [facility] but soon following developed significant cervical pain  Eventually went to ED -- treated/monitored there; noted also to develop pneumonia  He was finally discharged on abx, pain control  He denies any shortness of breath or cough    Next spine follow up with [facility] 3/21; colonoscopy 3/22    Tolerated the addition of hydrochlorothiazide, losartan  He did not take medication today  Has had some lower readings at home    Summary of hospitalization:  M " "[facility] hospital discharge summary reviewed  Diagnostic Tests/Treatments reviewed.  Follow up needed: updating today  Other Healthcare Providers Involved in Patient s Care:          Spine team  Update since discharge: fluctuating course. Now improved following the hospitalization for pain control         Plan of care communicated with patient and spouse                 Review of Systems  CONSTITUTIONAL: NEGATIVE for fever, chills, change in weight  ENT/MOUTH: NEGATIVE for ear, mouth and throat problems  RESP: NEGATIVE for significant cough or SOB  CV: NEGATIVE for chest pain, palpitations or peripheral edema  MUSCULOSKELETAL: POSITIVE  for neck stiffness; pain improving  PSYCHIATRIC: POSITIVE for stress      Objective    BP (!) 147/80 (BP Location: Right arm, Patient Position: Sitting, Cuff Size: Adult Large)   Pulse 60   Temp 97.5  F (36.4  C) (Oral)   Resp 16   Ht 1.803 m (5' 11\")   Wt 104.9 kg (231 lb 3.2 oz)   BMI 32.25 kg/m    Body mass index is 32.25 kg/m .  Physical Exam   GENERAL: alert and no distress  EYES: Eyes grossly normal to inspection, PERRL and conjunctivae and sclerae normal  HENT: ear canals and TM's normal, nose and mouth without ulcers or lesions  NECK: he is wearing hard collar today.   RESP: lungs clear to auscultation - no rales, rhonchi or wheezes  CV: regular rates and rhythm  MS: moving extremities wnl  SKIN: incision is well healed/approximated along the cervical spine; sutures intact; mild crusted blood/slough along incisional line  PSYCH: mentation appears normal, affect normal/bright          Signed Electronically by: Cr Corley PA-C    "

## 2024-02-09 ENCOUNTER — MYC MEDICAL ADVICE (OUTPATIENT)
Dept: FAMILY MEDICINE | Facility: CLINIC | Age: 69
End: 2024-02-09
Payer: MEDICARE

## 2024-02-22 ENCOUNTER — HOSPITAL ENCOUNTER (OUTPATIENT)
Facility: CLINIC | Age: 69
Discharge: HOME | End: 2024-02-22
Attending: INTERNAL MEDICINE | Admitting: INTERNAL MEDICINE
Payer: MEDICARE

## 2024-02-22 VITALS
OXYGEN SATURATION: 96 % | SYSTOLIC BLOOD PRESSURE: 115 MMHG | DIASTOLIC BLOOD PRESSURE: 77 MMHG | WEIGHT: 225 LBS | HEART RATE: 62 BPM | RESPIRATION RATE: 16 BRPM | BODY MASS INDEX: 31.38 KG/M2

## 2024-02-22 LAB — COLONOSCOPY: NORMAL

## 2024-02-22 PROCEDURE — 88305 TISSUE EXAM BY PATHOLOGIST: CPT | Mod: 26 | Performed by: PATHOLOGY

## 2024-02-22 PROCEDURE — 45382 COLONOSCOPY W/CONTROL BLEED: CPT | Performed by: INTERNAL MEDICINE

## 2024-02-22 PROCEDURE — G0500 MOD SEDAT ENDO SERVICE >5YRS: HCPCS | Performed by: INTERNAL MEDICINE

## 2024-02-22 PROCEDURE — 99153 MOD SED SAME PHYS/QHP EA: CPT | Performed by: INTERNAL MEDICINE

## 2024-02-22 PROCEDURE — 88305 TISSUE EXAM BY PATHOLOGIST: CPT | Mod: TC | Performed by: INTERNAL MEDICINE

## 2024-02-22 PROCEDURE — 250N000011 HC RX IP 250 OP 636: Performed by: INTERNAL MEDICINE

## 2024-02-22 PROCEDURE — 45385 COLONOSCOPY W/LESION REMOVAL: CPT | Mod: PT | Performed by: INTERNAL MEDICINE

## 2024-02-22 RX ORDER — FENTANYL CITRATE 50 UG/ML
50-100 INJECTION, SOLUTION INTRAMUSCULAR; INTRAVENOUS EVERY 5 MIN PRN
Status: DISCONTINUED | OUTPATIENT
Start: 2024-02-22 | End: 2024-02-22 | Stop reason: HOSPADM

## 2024-02-22 RX ORDER — NALOXONE HYDROCHLORIDE 0.4 MG/ML
0.4 INJECTION, SOLUTION INTRAMUSCULAR; INTRAVENOUS; SUBCUTANEOUS
Status: DISCONTINUED | OUTPATIENT
Start: 2024-02-22 | End: 2024-02-22 | Stop reason: HOSPADM

## 2024-02-22 RX ORDER — DIPHENHYDRAMINE HYDROCHLORIDE 50 MG/ML
25-50 INJECTION INTRAMUSCULAR; INTRAVENOUS
Status: DISCONTINUED | OUTPATIENT
Start: 2024-02-22 | End: 2024-02-22 | Stop reason: HOSPADM

## 2024-02-22 RX ORDER — LIDOCAINE 40 MG/G
CREAM TOPICAL
Status: DISCONTINUED | OUTPATIENT
Start: 2024-02-22 | End: 2024-02-22 | Stop reason: HOSPADM

## 2024-02-22 RX ORDER — FLUMAZENIL 0.1 MG/ML
0.2 INJECTION, SOLUTION INTRAVENOUS
Status: DISCONTINUED | OUTPATIENT
Start: 2024-02-22 | End: 2024-02-22 | Stop reason: HOSPADM

## 2024-02-22 RX ORDER — NALOXONE HYDROCHLORIDE 0.4 MG/ML
0.2 INJECTION, SOLUTION INTRAMUSCULAR; INTRAVENOUS; SUBCUTANEOUS
Status: DISCONTINUED | OUTPATIENT
Start: 2024-02-22 | End: 2024-02-22 | Stop reason: HOSPADM

## 2024-02-22 RX ORDER — ONDANSETRON 2 MG/ML
4 INJECTION INTRAMUSCULAR; INTRAVENOUS EVERY 6 HOURS PRN
Status: DISCONTINUED | OUTPATIENT
Start: 2024-02-22 | End: 2024-02-22 | Stop reason: HOSPADM

## 2024-02-22 RX ORDER — ATROPINE SULFATE 0.1 MG/ML
1 INJECTION INTRAVENOUS
Status: DISCONTINUED | OUTPATIENT
Start: 2024-02-22 | End: 2024-02-22 | Stop reason: HOSPADM

## 2024-02-22 RX ORDER — PROCHLORPERAZINE MALEATE 5 MG
5 TABLET ORAL EVERY 6 HOURS PRN
Status: DISCONTINUED | OUTPATIENT
Start: 2024-02-22 | End: 2024-02-22 | Stop reason: HOSPADM

## 2024-02-22 RX ORDER — SIMETHICONE 40MG/0.6ML
133 SUSPENSION, DROPS(FINAL DOSAGE FORM)(ML) ORAL
Status: DISCONTINUED | OUTPATIENT
Start: 2024-02-22 | End: 2024-02-22 | Stop reason: HOSPADM

## 2024-02-22 RX ORDER — ONDANSETRON 4 MG/1
4 TABLET, ORALLY DISINTEGRATING ORAL EVERY 6 HOURS PRN
Status: DISCONTINUED | OUTPATIENT
Start: 2024-02-22 | End: 2024-02-22 | Stop reason: HOSPADM

## 2024-02-22 RX ORDER — ONDANSETRON 2 MG/ML
4 INJECTION INTRAMUSCULAR; INTRAVENOUS
Status: DISCONTINUED | OUTPATIENT
Start: 2024-02-22 | End: 2024-02-22 | Stop reason: HOSPADM

## 2024-02-22 RX ORDER — EPINEPHRINE 1 MG/ML
0.1 INJECTION, SOLUTION INTRAMUSCULAR; SUBCUTANEOUS
Status: DISCONTINUED | OUTPATIENT
Start: 2024-02-22 | End: 2024-02-22 | Stop reason: HOSPADM

## 2024-02-22 RX ADMIN — FENTANYL CITRATE 100 MCG: 50 INJECTION, SOLUTION INTRAMUSCULAR; INTRAVENOUS at 12:17

## 2024-02-22 RX ADMIN — FENTANYL CITRATE 50 MCG: 50 INJECTION, SOLUTION INTRAMUSCULAR; INTRAVENOUS at 12:39

## 2024-02-22 RX ADMIN — MIDAZOLAM 1 MG: 1 INJECTION INTRAMUSCULAR; INTRAVENOUS at 12:39

## 2024-02-22 RX ADMIN — MIDAZOLAM 2 MG: 1 INJECTION INTRAMUSCULAR; INTRAVENOUS at 12:17

## 2024-02-22 ASSESSMENT — ACTIVITIES OF DAILY LIVING (ADL)
ADLS_ACUITY_SCORE: 38
ADLS_ACUITY_SCORE: 38

## 2024-02-22 NOTE — H&P
Pre-Endoscopy History and Physical     Lev Cabral MRN# [id number]   YOB: 1955 Age: 68 year old     Date of Procedure: 2/22/2024  Primary care provider: Cr Corley  Type of Endoscopy: Colonoscopy with possible biopsy, possible polypectomy  Reason for Procedure: polyp  Type of Anesthesia Anticipated: Conscious Sedation    HPI:    Lev is a 68 year old male who will be undergoing the above procedure.      A history and physical has been performed. The patient's medications and allergies have been reviewed. The risks and benefits of the procedure and the sedation options and risks were discussed with the patient.  All questions were answered and informed consent was obtained.      He denies a personal or family history of anesthesia complications or bleeding disorders.     Patient Active Problem List   Diagnosis    CARDIOVASCULAR SCREENING; LDL GOAL LESS THAN 130    Family history of diabetes mellitus    Advanced directives, counseling/discussion    Cervicalgia    Acute reaction to stress    Peyronie's disease    Mass of chest wall, left    Chondrosarcoma of ribs (H)    Mild ascending aorta dilation (H24)    Acute post-operative pain        Past Medical History:   Diagnosis Date    Hypertension     Unspecified adjustment reaction         Past Surgical History:   Procedure Laterality Date    APPENDECTOMY      BACK SURGERY      COLONOSCOPY  2009    Dr. Oliva    COLONOSCOPY N/A 02/09/2017    Procedure: COLONOSCOPY;  Surgeon: Amos Loving MD;  Location:  GI    ENT SURGERY      tonsils    LAMINOPLASTY  01/22/2024    ORTHOPEDIC SURGERY  1999    neck fusion- 3 level    ORTHOPEDIC SURGERY Left     left knee arthroscopic    REPAIR CHEST WALL N/A 12/01/2017    Procedure: REPAIR CHEST WALL;  RESECTION OF LEFT ANTERIOR CHEST WALL MASS AND RECONSTRUCTION WITH GOR-TIM SOFT TISSUE PATCH, PRIMARY REPAIR OF DIAPHRAGM;  Surgeon: Faizan Sterling MD;  Location:  OR       Social History  "    Tobacco Use    Smoking status: Never     Passive exposure: Never    Smokeless tobacco: Never   Substance Use Topics    Alcohol use: No     Alcohol/week: 0.0 standard drinks of alcohol       Family History   Problem Relation Age of Onset    Cancer Father         brain tumor    Diabetes Father     Diabetes Mother         Type 2    Colon Cancer No family hx of        Prior to Admission medications    Medication Sig Start Date End Date Taking? Authorizing Provider   acetaminophen (TYLENOL) 500 MG tablet Take 1 tablet (500 mg) by mouth 4 times daily as needed for mild pain 2/4/24  Yes Doni Wagoner MD   losartan (COZAAR) 50 MG tablet Take 0.5 tablets (25 mg) by mouth daily 2/8/24  Yes Cr Corley PA-C   psyllium (METAMUCIL) 58.6 % POWD Take 2 Tablespoonful by mouth daily   Yes Reported, Patient   venlafaxine (EFFEXOR XR) 150 MG 24 hr capsule TAKE 1 CAPSULE BY MOUTH EVERY DAY 12/26/23  Yes Cr Corley PA-C   vitamin B-12 (CYANOCOBALAMIN) 2500 MCG sublingual tablet Take 1,000 mcg by mouth daily   Yes Reported, Patient   vitamin C (ASCORBIC ACID) 1000 MG TABS Take 1,000 mg by mouth daily   Yes Reported, Patient   VITAMIN D, CHOLECALCIFEROL, PO Take 1,000 Units by mouth daily   Yes Reported, Patient   melatonin 5 MG tablet Take 1 tablet (5 mg) by mouth nightly as needed for sleep (sleep) 2/4/24 3/5/24  Doni Wagoner MD   polyethylene glycol (MIRALAX) 17 g packet Take 17 g by mouth 1/25/24   Reported, Patient   sildenafil (REVATIO) 20 MG tablet TAKE TWO TO FIVE TABLETS BY MOUTH 30 MINUTES TO 4 HOURS BEFORE SEX. 1/2/24   Cr Corley PA-C       No Known Allergies     REVIEW OF SYSTEMS:   5 point ROS negative except as noted above in HPI, including Gen., Resp., CV, GI &  system review.    PHYSICAL EXAM:   There were no vitals taken for this visit. Estimated body mass index is 32.25 kg/m  as calculated from the following:    Height as of 2/8/24: 1.803 m (5' 11\").    Weight as of 2/8/24: " 104.9 kg (231 lb 3.2 oz).   GENERAL APPEARANCE: alert, and oriented  MENTAL STATUS: alert  AIRWAY EXAM: Mallampatti Class I (visualization of the soft palate, fauces, uvula, anterior and posterior pillars)  RESP: lungs clear to auscultation - no rales, rhonchi or wheezes  CV: regular rates and rhythm  DIAGNOSTICS:    Not indicated    IMPRESSION   ASA Class 2 - Mild systemic disease    PLAN:   Plan for Colonoscopy with possible biopsy, possible polypectomy. We discussed the risks, benefits and alternatives and the patient wished to proceed.    The above has been forwarded to the consulting provider.      Signed Electronically by: Amos Loving MD  February 22, 2024

## 2024-02-23 LAB
PATH REPORT.COMMENTS IMP SPEC: NORMAL
PATH REPORT.COMMENTS IMP SPEC: NORMAL
PATH REPORT.FINAL DX SPEC: NORMAL
PATH REPORT.GROSS SPEC: NORMAL
PATH REPORT.MICROSCOPIC SPEC OTHER STN: NORMAL
PATH REPORT.RELEVANT HX SPEC: NORMAL
PHOTO IMAGE: NORMAL

## 2024-03-11 ENCOUNTER — TRANSFERRED RECORDS (OUTPATIENT)
Dept: HEALTH INFORMATION MANAGEMENT | Facility: CLINIC | Age: 69
End: 2024-03-11
Payer: MEDICARE

## 2024-03-18 ENCOUNTER — TELEPHONE (OUTPATIENT)
Dept: FAMILY MEDICINE | Facility: CLINIC | Age: 69
End: 2024-03-18

## 2024-03-18 ENCOUNTER — ANCILLARY PROCEDURE (OUTPATIENT)
Dept: GENERAL RADIOLOGY | Facility: CLINIC | Age: 69
End: 2024-03-18
Attending: PHYSICIAN ASSISTANT
Payer: MEDICARE

## 2024-03-18 DIAGNOSIS — Z09 HOSPITAL DISCHARGE FOLLOW-UP: ICD-10-CM

## 2024-03-18 PROCEDURE — 71046 X-RAY EXAM CHEST 2 VIEWS: CPT | Mod: TC | Performed by: RADIOLOGY

## 2024-03-18 NOTE — TELEPHONE ENCOUNTER
Patient calling for results of chest xray. Notified we are awaiting interpretation from Primary Care Provider.

## 2024-04-01 ENCOUNTER — THERAPY VISIT (OUTPATIENT)
Dept: PHYSICAL THERAPY | Facility: CLINIC | Age: 69
End: 2024-04-01
Payer: MEDICARE

## 2024-04-01 ENCOUNTER — TRANSFERRED RECORDS (OUTPATIENT)
Dept: HEALTH INFORMATION MANAGEMENT | Facility: CLINIC | Age: 69
End: 2024-04-01

## 2024-04-01 DIAGNOSIS — Z47.89 AFTERCARE FOLLOWING SURGERY OF THE MUSCULOSKELETAL SYSTEM: Primary | ICD-10-CM

## 2024-04-01 DIAGNOSIS — M54.2 NECK PAIN: ICD-10-CM

## 2024-04-01 PROCEDURE — 97161 PT EVAL LOW COMPLEX 20 MIN: CPT | Mod: GP | Performed by: PHYSICAL THERAPIST

## 2024-04-01 PROCEDURE — 97110 THERAPEUTIC EXERCISES: CPT | Mod: GP | Performed by: PHYSICAL THERAPIST

## 2024-04-01 NOTE — PROGRESS NOTES
PHYSICAL THERAPY EVALUATION  Type of Visit: Evaluation    See electronic medical record for Abuse and Falls Screening details.    Subjective       Presenting condition or subjective complaint: Main concern is weakness, decreased ability to turn, move his neck and upper body, i.e. look to the right driving.  He has minimal to no neck pain.  right shoulder feels better too.  still hurts lifting out to the side.  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   POSTURE: Sitting Posture: Rounded shoulders, Forward head, Thoracic kyphosis increased    BALANCE/PROPRIOCEPTION:  not assessed  WEIGHTBEARING ALIGNMENT: WFL  ROM:  Cx arom.   Did not assess flexion, ext, SB.  Pain-free rotation rt 60, left 60.  Pt advised to avoid stretching or moving into end range with any pain.     Thoracic rotation limited @ 50, worse to right.  B shoulder flexion, elevation wnls.   MYOTOMES: WNL    NEURAL TENSION:  not assessed    PALPATION:  not assessed.    SPINAL SEGMENTAL CONCLUSIONS:  not assessed      Assessment & Plan   CLINICAL IMPRESSIONS  Medical Diagnosis: C3-C6 laminoplasty, Cervical disc disorder with  Myelopathy    Treatment Diagnosis: decreased neck arom, weakness, neck, B shoulder   Impression/Assessment: Patient is a 68 year old male with neck, shoulder complaints.  The following significant findings have been identified: Decreased ROM/flexibility, Decreased strength, Decreased proprioception, Decreased activity tolerance, and Impaired posture. These impairments interfere with their ability to perform self care tasks, recreational activities, household chores, driving , and household mobility as compared to previous level of function.     Clinical Decision Making (Complexity):  Clinical Presentation: Stable/Uncomplicated  Clinical Presentation Rationale: based on medical and personal factors listed in PT evaluation  Clinical Decision Making (Complexity): Low complexity    PLAN OF CARE  Treatment Interventions:  Interventions: Neuromuscular Re-education, Therapeutic Activity, Therapeutic Exercise, Self-Care/Home Management    Long Term Goals     PT Goal 1  Goal Identifier: Reaching  Goal Description: The patient will be able to reach to and above shoulder height, to the floor and across their body without pain.  They have within functional limits scapulohumeral rhythm.  Rationale: to maximize safety and independence with performance of ADLs and functional tasks;to maximize safety and independence within the home;to maximize safety and independence within the community  Target Date: 05/27/24  PT Goal 2  Goal Identifier: Driving,  Goal Description: able to drive safely, able to see to the right and left, using tx and cx rotation, pain-free  Rationale: to maximize safety and independence with performance of ADLs and functional tasks;to maximize safety and independence with transportation  Target Date: 05/27/24      Frequency of Treatment: 1x/week  Duration of Treatment: 8    Education Assessment:   Learner/Method: Patient;Listening;Demonstration;Pictures/Video  Education Comments: Patient participated in their  education    Risks and benefits of evaluation/treatment have been explained.   Patient/Family/caregiver agrees with Plan of Care.     Evaluation Time:     PT Eval, Low Complexity Minutes (87158): 25       Signing Clinician: SUDHA Gama [facility]                                                                                   OUTPATIENT PHYSICAL THERAPY      PLAN OF TREATMENT FOR OUTPATIENT REHABILITATION   Patient's Last Name, First Name, Lev Schwab YOB: 1955   Provider's Name   [facility]   Medical Record No.  [id number]     Onset Date: 01/24/24  Start of Care Date: 04/01/24     Medical Diagnosis:  C3-C6 laminoplasty, Cervical disc disorder with Myelopathy      PT Treatment Diagnosis:  decreased neck arom, weakness, neck, B shoulder Plan of Treatment  Frequency/Duration: 1x/week/ 8    Certification date from 04/01/24 to 05/27/24         See note for plan of treatment details and functional goals     Paul Villafuerte, PT                         I CERTIFY THE NEED FOR THESE SERVICES FURNISHED UNDER        THIS PLAN OF TREATMENT AND WHILE UNDER MY CARE .             Physician Signature               Date    X_____________________________________________________                  Referring Provider:  Herman Johnston    Initial Assessment  See Epic Evaluation- Start of Care Date: 04/01/24

## 2024-04-02 ENCOUNTER — TRANSCRIBE ORDERS (OUTPATIENT)
Dept: OTHER | Age: 69
End: 2024-04-02

## 2024-04-03 ENCOUNTER — THERAPY VISIT (OUTPATIENT)
Dept: PHYSICAL THERAPY | Facility: CLINIC | Age: 69
End: 2024-04-03
Payer: MEDICARE

## 2024-04-03 DIAGNOSIS — M54.2 NECK PAIN: ICD-10-CM

## 2024-04-03 DIAGNOSIS — Z47.89 AFTERCARE FOLLOWING SURGERY OF THE MUSCULOSKELETAL SYSTEM: Primary | ICD-10-CM

## 2024-04-03 PROCEDURE — 97112 NEUROMUSCULAR REEDUCATION: CPT | Mod: GP | Performed by: PHYSICAL THERAPIST

## 2024-04-03 PROCEDURE — 97110 THERAPEUTIC EXERCISES: CPT | Mod: GP | Performed by: PHYSICAL THERAPIST

## 2024-04-17 ENCOUNTER — MYC MEDICAL ADVICE (OUTPATIENT)
Dept: FAMILY MEDICINE | Facility: CLINIC | Age: 69
End: 2024-04-17
Payer: MEDICARE

## 2024-04-17 DIAGNOSIS — I10 ESSENTIAL HYPERTENSION: ICD-10-CM

## 2024-04-17 RX ORDER — LOSARTAN POTASSIUM 50 MG/1
50 TABLET ORAL DAILY
Qty: 90 TABLET | Refills: 1 | Status: SHIPPED | OUTPATIENT
Start: 2024-04-17

## 2024-04-17 NOTE — TELEPHONE ENCOUNTER
See mychart, requested patient confirm requested medication and preferred pharmacy. Will await response.     Lorna BREWSTER RN, BSN  [facility]  [phone]

## 2024-04-17 NOTE — TELEPHONE ENCOUNTER
Cr Corley PA-C    See mychart:  Patient requests refill of Losartan, reports he is currently taking 50 mg daily. Updated sig needed, please review.    Per mychart 3/11/24:  Alejo Cabral   to P  Triage (supporting Cr Corley PA-C)      3/11/24 10:40 AM  Kevin Hdz, looks like 1 full pill of blood pressure medicine is best for now. See below and advise.  2-28 124/77  2-29 125/73  3-1 127/73  3-2 129/79  3-3 122/77  3-4 126/75  3-5 139/81  3-6 133/77  3-7 127/78  Also I did not take my depression meds for 2 days. Got dizzy and had a migraine headache it was terrible!! Could not even move! Am I addicted to this? How would I ever get off of it??? Kind of scary!!!!  Please advise.  Hope all is ok with you and your family!  Did you know Wilder Cesar MD?    Lorna BREWSTER RN, BSN  [facility]  [phone]

## 2024-05-03 ENCOUNTER — HOSPITAL ENCOUNTER (EMERGENCY)
Facility: CLINIC | Age: 69
Discharge: HOME | End: 2024-05-04
Attending: EMERGENCY MEDICINE | Admitting: EMERGENCY MEDICINE
Payer: MEDICARE

## 2024-05-03 DIAGNOSIS — R79.89 ELEVATED TROPONIN: ICD-10-CM

## 2024-05-03 DIAGNOSIS — R00.2 PALPITATIONS: ICD-10-CM

## 2024-05-03 LAB
ANION GAP SERPL CALCULATED.3IONS-SCNC: 13 MMOL/L (ref 7–15)
BASOPHILS # BLD AUTO: 0 10E3/UL (ref 0–0.2)
BASOPHILS NFR BLD AUTO: 0 %
BUN SERPL-MCNC: 21 MG/DL (ref 8–23)
CALCIUM SERPL-MCNC: 9.1 MG/DL (ref 8.8–10.2)
CHLORIDE SERPL-SCNC: 106 MMOL/L (ref 98–107)
CREAT SERPL-MCNC: 1 MG/DL (ref 0.67–1.17)
DEPRECATED HCO3 PLAS-SCNC: 23 MMOL/L (ref 22–29)
EGFRCR SERPLBLD CKD-EPI 2021: 81 ML/MIN/1.73M2
EOSINOPHIL # BLD AUTO: 0.2 10E3/UL (ref 0–0.7)
EOSINOPHIL NFR BLD AUTO: 2 %
ERYTHROCYTE [DISTWIDTH] IN BLOOD BY AUTOMATED COUNT: 12.9 % (ref 10–15)
GLUCOSE SERPL-MCNC: 161 MG/DL (ref 70–99)
HCT VFR BLD AUTO: 47.7 % (ref 40–53)
HGB BLD-MCNC: 16.2 G/DL (ref 13.3–17.7)
HOLD SPECIMEN: NORMAL
HOLD SPECIMEN: NORMAL
IMM GRANULOCYTES # BLD: 0 10E3/UL
IMM GRANULOCYTES NFR BLD: 0 %
LYMPHOCYTES # BLD AUTO: 1.6 10E3/UL (ref 0.8–5.3)
LYMPHOCYTES NFR BLD AUTO: 24 %
MAGNESIUM SERPL-MCNC: 1.9 MG/DL (ref 1.7–2.3)
MCH RBC QN AUTO: 30.7 PG (ref 26.5–33)
MCHC RBC AUTO-ENTMCNC: 34 G/DL (ref 31.5–36.5)
MCV RBC AUTO: 91 FL (ref 78–100)
MONOCYTES # BLD AUTO: 0.6 10E3/UL (ref 0–1.3)
MONOCYTES NFR BLD AUTO: 9 %
NEUTROPHILS # BLD AUTO: 4.4 10E3/UL (ref 1.6–8.3)
NEUTROPHILS NFR BLD AUTO: 65 %
NRBC # BLD AUTO: 0 10E3/UL
NRBC BLD AUTO-RTO: 0 /100
PLATELET # BLD AUTO: 196 10E3/UL (ref 150–450)
POTASSIUM SERPL-SCNC: 3.6 MMOL/L (ref 3.4–5.3)
RBC # BLD AUTO: 5.27 10E6/UL (ref 4.4–5.9)
SODIUM SERPL-SCNC: 142 MMOL/L (ref 135–145)
T4 FREE SERPL-MCNC: 1.03 NG/DL (ref 0.9–1.7)
TROPONIN T SERPL HS-MCNC: 24 NG/L
TSH SERPL DL<=0.005 MIU/L-ACNC: 6.79 UIU/ML (ref 0.3–4.2)
WBC # BLD AUTO: 6.8 10E3/UL (ref 4–11)

## 2024-05-03 PROCEDURE — 80048 BASIC METABOLIC PNL TOTAL CA: CPT | Performed by: EMERGENCY MEDICINE

## 2024-05-03 PROCEDURE — 85025 COMPLETE CBC W/AUTO DIFF WBC: CPT | Performed by: EMERGENCY MEDICINE

## 2024-05-03 PROCEDURE — 85379 FIBRIN DEGRADATION QUANT: CPT | Performed by: EMERGENCY MEDICINE

## 2024-05-03 PROCEDURE — 84443 ASSAY THYROID STIM HORMONE: CPT | Performed by: EMERGENCY MEDICINE

## 2024-05-03 PROCEDURE — 83880 ASSAY OF NATRIURETIC PEPTIDE: CPT | Performed by: EMERGENCY MEDICINE

## 2024-05-03 PROCEDURE — 99285 EMERGENCY DEPT VISIT HI MDM: CPT | Mod: 25

## 2024-05-03 PROCEDURE — 93005 ELECTROCARDIOGRAM TRACING: CPT

## 2024-05-03 PROCEDURE — 36415 COLL VENOUS BLD VENIPUNCTURE: CPT | Performed by: EMERGENCY MEDICINE

## 2024-05-03 PROCEDURE — 83735 ASSAY OF MAGNESIUM: CPT | Performed by: EMERGENCY MEDICINE

## 2024-05-03 PROCEDURE — 84439 ASSAY OF FREE THYROXINE: CPT | Performed by: EMERGENCY MEDICINE

## 2024-05-03 PROCEDURE — 84484 ASSAY OF TROPONIN QUANT: CPT | Performed by: EMERGENCY MEDICINE

## 2024-05-03 ASSESSMENT — ACTIVITIES OF DAILY LIVING (ADL): ADLS_ACUITY_SCORE: 38

## 2024-05-03 ASSESSMENT — COLUMBIA-SUICIDE SEVERITY RATING SCALE - C-SSRS
1. IN THE PAST MONTH, HAVE YOU WISHED YOU WERE DEAD OR WISHED YOU COULD GO TO SLEEP AND NOT WAKE UP?: NO
6. HAVE YOU EVER DONE ANYTHING, STARTED TO DO ANYTHING, OR PREPARED TO DO ANYTHING TO END YOUR LIFE?: NO
2. HAVE YOU ACTUALLY HAD ANY THOUGHTS OF KILLING YOURSELF IN THE PAST MONTH?: NO

## 2024-05-04 ENCOUNTER — APPOINTMENT (OUTPATIENT)
Dept: CT IMAGING | Facility: CLINIC | Age: 69
End: 2024-05-04
Attending: EMERGENCY MEDICINE
Payer: MEDICARE

## 2024-05-04 VITALS
TEMPERATURE: 97.3 F | HEIGHT: 72 IN | RESPIRATION RATE: 16 BRPM | SYSTOLIC BLOOD PRESSURE: 124 MMHG | BODY MASS INDEX: 31.86 KG/M2 | DIASTOLIC BLOOD PRESSURE: 67 MMHG | HEART RATE: 63 BPM | OXYGEN SATURATION: 96 % | WEIGHT: 235.23 LBS

## 2024-05-04 LAB
D DIMER PPP FEU-MCNC: 0.94 UG/ML FEU (ref 0–0.5)
NT-PROBNP SERPL-MCNC: 82 PG/ML (ref 0–900)
TROPONIN T SERPL HS-MCNC: 24 NG/L

## 2024-05-04 PROCEDURE — 250N000011 HC RX IP 250 OP 636: Performed by: EMERGENCY MEDICINE

## 2024-05-04 PROCEDURE — 71275 CT ANGIOGRAPHY CHEST: CPT | Mod: ME

## 2024-05-04 PROCEDURE — 250N000013 HC RX MED GY IP 250 OP 250 PS 637: Performed by: EMERGENCY MEDICINE

## 2024-05-04 PROCEDURE — 84484 ASSAY OF TROPONIN QUANT: CPT | Performed by: EMERGENCY MEDICINE

## 2024-05-04 PROCEDURE — 93005 ELECTROCARDIOGRAM TRACING: CPT

## 2024-05-04 PROCEDURE — 36415 COLL VENOUS BLD VENIPUNCTURE: CPT | Performed by: EMERGENCY MEDICINE

## 2024-05-04 RX ORDER — ASPIRIN 81 MG/1
324 TABLET, CHEWABLE ORAL ONCE
Status: COMPLETED | OUTPATIENT
Start: 2024-05-04 | End: 2024-05-04

## 2024-05-04 RX ORDER — ASPIRIN 81 MG/1
81 TABLET ORAL DAILY
Qty: 30 TABLET | Refills: 0 | Status: SHIPPED | OUTPATIENT
Start: 2024-05-04 | End: 2024-06-03

## 2024-05-04 RX ORDER — IOPAMIDOL 755 MG/ML
500 INJECTION, SOLUTION INTRAVASCULAR ONCE
Status: COMPLETED | OUTPATIENT
Start: 2024-05-04 | End: 2024-05-04

## 2024-05-04 RX ADMIN — ASPIRIN 81 MG CHEWABLE TABLET 324 MG: 81 TABLET CHEWABLE at 01:06

## 2024-05-04 RX ADMIN — IOPAMIDOL 85 ML: 755 INJECTION, SOLUTION INTRAVENOUS at 00:56

## 2024-05-04 ASSESSMENT — ACTIVITIES OF DAILY LIVING (ADL)
ADLS_ACUITY_SCORE: 38
ADLS_ACUITY_SCORE: 38

## 2024-05-04 NOTE — ED PROVIDER NOTES
Emergency Department Note      History of Present Illness     Chief Complaint  Palpitations    HPI  Lev Cabral is a 68 year old male with a history of chondrosarcoma, HTN and sleep apnea who presents with palpitation around 9 PM. Patient reports he was watching the [employer] game when symptoms came on suddenly. He reports palpitations and noting his HR at 140 bpm.  He also reported an associated cramp in his chest at that time though symptoms subsided after an hour.  He is asymptomatic at bedside.  Denies fever, chills, current chest pain, shortness of breath, nausea, vomiting, abdominal pain, black or bloody stool. Patient reports history of chronic dry cough. No alcohol use. No history of underlying arrhythmias.    No history blood clots.     Independent Historian  None    Review of External Notes  3/21/24 office visit  Past Medical History   Medical History and Problem List  Hypertension   Depression   Anxiety   Sleep apnea   Chondrosarcoma   Ectasia Thoracic Aortic       Medications  Cozaar   Miralax   Revatio        Surgical History   Appendectomy  Back surgery   Colonoscopy   Tonsillectomy   Laminoplasty   Knee arthroscopic  Neck fusion   Repair chest wall   Vasectomy   Physical Exam   Patient Vitals for the past 24 hrs:   BP Temp Temp src Pulse Resp SpO2 Height Weight   05/03/24 2245 134/86 -- -- 64 -- 96 % -- --   05/03/24 2230 (!) 152/102 -- -- 66 -- 95 % -- --   05/03/24 2221 (!) 144/90 -- -- -- -- -- -- --   05/03/24 2220 -- 97.3  F (36.3  C) Temporal 75 16 98 % 1.829 m (6') 106.7 kg (235 lb 3.7 oz)     Physical Exam  Nursing note and vitals reviewed.  Constitutional: Well nourished. Resting comfortably.   Eyes: Conjunctiva normal.  Pupils are equal, round, and reactive to light.   ENT: Nose normal. Mucous membranes pink and moist.    Neck: Normal range of motion.  CVS: Normal rate, regular rhythm.  Normal heart sounds.    Pulmonary: Lungs clear to auscultation bilaterally. No  wheezes/rales/rhonchi.  GI: Abdomen soft. Nontender, nondistended. No rigidity or guarding.    MSK: No calf tenderness; trace LE edema  Neuro: Alert. Follows simple commands.  Skin: Skin is warm and dry. No rash noted.   Psychiatric: Normal affect.     Diagnostics   Lab Results   Labs Ordered and Resulted from Time of ED Arrival to Time of ED Departure   BASIC METABOLIC PANEL - Abnormal       Result Value    Sodium 142      Potassium 3.6      Chloride 106      Carbon Dioxide (CO2) 23      Anion Gap 13      Urea Nitrogen 21.0      Creatinine 1.00      GFR Estimate 81      Calcium 9.1      Glucose 161 (*)    TSH WITH FREE T4 REFLEX - Abnormal    TSH 6.79 (*)    TROPONIN T, HIGH SENSITIVITY - Abnormal    Troponin T, High Sensitivity 24 (*)    D DIMER QUANTITATIVE - Abnormal    D-Dimer Quantitative 0.94 (*)    TROPONIN T, HIGH SENSITIVITY - Abnormal    Troponin T, High Sensitivity 24 (*)    MAGNESIUM - Normal    Magnesium 1.9     T4 FREE - Normal    Free T4 1.03     NT PROBNP INPATIENT - Normal    N terminal Pro BNP Inpatient 82     CBC WITH PLATELETS AND DIFFERENTIAL    WBC Count 6.8      RBC Count 5.27      Hemoglobin 16.2      Hematocrit 47.7      MCV 91      MCH 30.7      MCHC 34.0      RDW 12.9      Platelet Count 196      % Neutrophils 65      % Lymphocytes 24      % Monocytes 9      % Eosinophils 2      % Basophils 0      % Immature Granulocytes 0      NRBCs per 100 WBC 0      Absolute Neutrophils 4.4      Absolute Lymphocytes 1.6      Absolute Monocytes 0.6      Absolute Eosinophils 0.2      Absolute Basophils 0.0      Absolute Immature Granulocytes 0.0      Absolute NRBCs 0.0         Imaging  CT Chest Pulmonary Embolism w Contrast   Final Result   IMPRESSION:   1.  Negative for pulmonary embolism. No acute abnormalities or CT findings to explain the patient's symptoms.          EKG   ECG results from 05/03/24   EKG 12-lead, tracing only     Value    Systolic Blood Pressure     Diastolic Blood Pressure      Ventricular Rate 63    Atrial Rate 63    WA Interval 202    QRS Duration 116        QTc 419    P Axis 67    R AXIS 61    T Axis 51    Interpretation ECG      Sinus rhythm  Incomplete right bundle branch block  Borderline ECG  When compared with ECG of 25-JAN-2024 22:58,  No significant change was found     EKG 12 lead     Value    Systolic Blood Pressure     Diastolic Blood Pressure     Ventricular Rate 58    Atrial Rate 58    WA Interval 202    QRS Duration 110        QTc 410    P Axis 74    R AXIS 68    T Axis 61    Interpretation ECG      Sinus bradycardia  Incomplete right bundle branch block  Borderline ECG  When compared with ECG of 03-MAY-2024 22:39, (unconfirmed)  No significant change was found           Independent Interpretation  None  ED Course    Medications Administered  Medications   aspirin (ASA) chewable tablet 324 mg (324 mg Oral $Given 5/4/24 0106)   sodium chloride (PF) 0.9% PF flush 100 mL (96 mLs Intravenous $Given 5/4/24 0056)   iopamidol (ISOVUE-370) solution 500 mL (85 mLs Intravenous $Given 5/4/24 0056)       Procedures  Procedures     Discussion of Management  None    Social Determinants of Health adding to complexity of care  None    ED Course  ED Course as of 05/03/24 2307   Fri May 03, 2024   1747 I obtained history and examined the patient as noted above.      Medical Decision Making / Diagnosis   CMS Diagnoses: None    MIPS     D-dimer elevated prompting CT r/o PE    AARON Mataandrew is a 68 year old male presented with a sensation of palpitations and chest cramp though asymptomatic upon arrival and remained asymptomatic throughout his time in the ED.  I considered a broad differential diagnosis including acute coronary syndrome, myocardial infarction, pulmonary embolism, electrolyte abnormalities, drug reaction, anxiety, amongst others.  Electrolytes are normal and the patient is not anemic.  No EKG changes or concerning for acute coronary syndrome.  Mild troponin  elevation though repeat stable.  He continues to deny chest pain at bedside. D-dimer elevated so he did undergo formal CT which fortunately is without evidence of PE.  Discussed with patient cannot exclude underlying arrhythmia based on presentation, even possible paroxysmal atrial fibrillation. I do not feel it is unremarkable to initiate ASA at this time ([id number]) and will plan on ordering Ziopatch today. Close follow up with primary care is indicated.  Patient comfortable with plan of care, return precautions given.       Disposition  The patient was discharged.     ICD-10 Codes:    ICD-10-CM    1. Palpitations  R00.2 Zio Patch Mail Out      2. Elevated troponin  R79.89            Discharge Medications  New Prescriptions    ASPIRIN 81 MG EC TABLET    Take 1 tablet (81 mg) by mouth daily for 30 days         Scribe Disclosure:  [address], am serving as a scribe at 11:00 PM on 5/3/2024 to document services personally performed by Pippa Luke DO based on my observations and the provider's statements to me.     [employer]      Pippa Luke DO  05/04/24 0215

## 2024-05-04 NOTE — ED TRIAGE NOTES
Intermittent chest pain and palpitations. Patient checked his heart rate at home and it was in the 140's.

## 2024-05-05 ENCOUNTER — ORDERS ONLY (AUTO-RELEASED) (OUTPATIENT)
Dept: EMERGENCY MEDICINE | Facility: CLINIC | Age: 69
End: 2024-05-05
Payer: MEDICARE

## 2024-05-05 DIAGNOSIS — R00.2 PALPITATIONS: ICD-10-CM

## 2024-05-05 LAB
ATRIAL RATE - MUSE: 58 BPM
ATRIAL RATE - MUSE: 63 BPM
DIASTOLIC BLOOD PRESSURE - MUSE: NORMAL MMHG
DIASTOLIC BLOOD PRESSURE - MUSE: NORMAL MMHG
INTERPRETATION ECG - MUSE: NORMAL
INTERPRETATION ECG - MUSE: NORMAL
P AXIS - MUSE: 67 DEGREES
P AXIS - MUSE: 74 DEGREES
PR INTERVAL - MUSE: 202 MS
PR INTERVAL - MUSE: 202 MS
QRS DURATION - MUSE: 110 MS
QRS DURATION - MUSE: 116 MS
QT - MUSE: 410 MS
QT - MUSE: 418 MS
QTC - MUSE: 410 MS
QTC - MUSE: 419 MS
R AXIS - MUSE: 61 DEGREES
R AXIS - MUSE: 68 DEGREES
SYSTOLIC BLOOD PRESSURE - MUSE: NORMAL MMHG
SYSTOLIC BLOOD PRESSURE - MUSE: NORMAL MMHG
T AXIS - MUSE: 51 DEGREES
T AXIS - MUSE: 61 DEGREES
VENTRICULAR RATE- MUSE: 58 BPM
VENTRICULAR RATE- MUSE: 63 BPM

## 2024-05-26 DIAGNOSIS — N52.9 ERECTILE DYSFUNCTION, UNSPECIFIED ERECTILE DYSFUNCTION TYPE: ICD-10-CM

## 2024-05-28 RX ORDER — SILDENAFIL CITRATE 20 MG/1
TABLET ORAL
Qty: 30 TABLET | Refills: 2 | Status: SHIPPED | OUTPATIENT
Start: 2024-05-28 | End: 2024-10-03

## 2024-06-05 PROCEDURE — 93248 EXT ECG>7D<15D REV&INTERPJ: CPT | Performed by: INTERNAL MEDICINE

## 2024-06-26 DIAGNOSIS — F43.23 ADJUSTMENT DISORDER WITH MIXED ANXIETY AND DEPRESSED MOOD: ICD-10-CM

## 2024-06-26 RX ORDER — VENLAFAXINE HYDROCHLORIDE 150 MG/1
150 CAPSULE, EXTENDED RELEASE ORAL DAILY
Qty: 90 CAPSULE | Refills: 0 | Status: SHIPPED | OUTPATIENT
Start: 2024-06-26

## 2024-08-12 PROBLEM — M54.2 NECK PAIN: Status: RESOLVED | Noted: 2024-04-01 | Resolved: 2024-08-12

## 2024-08-12 PROBLEM — Z47.89 AFTERCARE FOLLOWING SURGERY OF THE MUSCULOSKELETAL SYSTEM: Status: RESOLVED | Noted: 2024-04-01 | Resolved: 2024-08-12

## 2024-08-12 NOTE — PROGRESS NOTES
DISCHARGE  Reason for Discharge: Patient chooses to discontinue therapy.    Equipment Issued: none    Discharge Plan: Patient to continue home program.    Referring Provider:  Herman Johnston

## 2024-09-01 ENCOUNTER — MYC MEDICAL ADVICE (OUTPATIENT)
Dept: FAMILY MEDICINE | Facility: CLINIC | Age: 69
End: 2024-09-01
Payer: MEDICARE

## 2024-09-03 ENCOUNTER — NURSE TRIAGE (OUTPATIENT)
Dept: FAMILY MEDICINE | Facility: CLINIC | Age: 69
End: 2024-09-03
Payer: MEDICARE

## 2024-09-03 NOTE — TELEPHONE ENCOUNTER
Nurse Triage SBAR    Is this a 2nd Level Triage? YES, LICENSED PRACTITIONER REVIEW IS REQUIRED    Situation: covid +, chest pain, rattling in chest     Background: covid + on 9/1, start of symptoms 8/31    Assessment:  Patient calls to discuss treatment for covid. Current symptoms include productive cough, yellow sputum. Feels a gurgling/rattling in chest. Denies wheezing. Reports medial chest pain when coughing, resolves when he is not coughing. Has not checked temperature. Denies any trouble breathing. Coughed so hard yesterday he was vomiting. Denies vomiting today.     Protocol Recommended Disposition:   Go To ED/UCC Now (Or To Office With PCP Approval)    Recommendation:  Routing to PCP. RN unable to proceed with paxlovid protocol due to current symptoms. Pt declining UC, would like input from PCP. Agree with need for UC? No virtual UC appointments today.     Routed to provider    Abril Kelly RN on 9/3/2024 at 1:08 PM    Reason for Disposition   Chest pain or pressure  (Exception: MILD central chest pain, present only when coughing.)    Additional Information   Negative: SEVERE difficulty breathing (e.g., struggling for each breath, speaks in single words)   Negative: Difficult to awaken or acting confused (e.g., disoriented, slurred speech)   Negative: Bluish (or gray) lips or face now   Negative: Shock suspected (e.g., cold/pale/clammy skin, too weak to stand, low BP, rapid pulse)   Negative: Sounds like a life-threatening emergency to the triager   Negative: Diagnosed or suspected COVID-19 and symptoms lasting 3 or more weeks   Negative: COVID-19 exposure and no symptoms   Negative: COVID-19 vaccine reaction suspected (e.g., fever, headache, muscle aches) occurring 1 to 3 days after getting vaccine   Negative: COVID-19 vaccine, questions about   Negative: Lives with someone known to have influenza (flu test positive) and flu-like symptoms (e.g., cough, runny nose, sore throat, SOB; with or without fever)    "Negative: Possible COVID-19 symptoms and triager concerned about severity of symptoms or other causes   Negative: COVID-19 and breastfeeding, questions about   Negative: SEVERE or constant chest pain or pressure  (Exception: Mild central chest pain, present only when coughing.)   Negative: MODERATE difficulty breathing (e.g., speaks in phrases, SOB even at rest, pulse 100-120)   Negative: Headache and stiff neck (can't touch chin to chest)   Negative: Oxygen level (e.g., pulse oximetry) 90% or lower    Answer Assessment - Initial Assessment Questions  1. COVID-19 DIAGNOSIS: positive home test 9/1/24  2. COVID-19 EXPOSURE: \"Was there any known exposure to COVID before the symptoms began?\" CDC Definition of close contact: within 6 feet (2 meters) for a total of 15 minutes or more over a 24-hour period.       Family members sick with covid   3. ONSET: \"When did the COVID-19 symptoms start?\"       8/31/24  4. WORST SYMPTOM: \"What is your worst symptom?\" (e.g., cough, fever, shortness of breath, muscle aches)      Sore throat, cough   5. COUGH: \"Do you have a cough?\" If Yes, ask: \"How bad is the cough?\"        Productive cough, yellow mucus   6. FEVER: \"Do you have a fever?\" If Yes, ask: \"What is your temperature, how was it measured, and when did it start?\"      Unsure, has not checked   7. RESPIRATORY STATUS: \"Describe your breathing?\" (e.g., normal; shortness of breath, wheezing, unable to speak)       Denies  8. BETTER-SAME-WORSE: \"Are you getting better, staying the same or getting worse compared to yesterday?\"  If getting worse, ask, \"In what way?\"      better  9. OTHER SYMPTOMS:       Reports: Chills, headache, sore throat       Denies: body aches  10. HIGH RISK DISEASE: \"Do you have any chronic medical problems?\" (e.g., asthma, heart or lung disease, weak immune system, obesity, etc.)        Obesity   11. VACCINE: \"Have you had the COVID-19 vaccine?\" If Yes, ask: \"Which one, how many shots, when did you get " "it?\"        Yes, 5 total   12. PREGNANCY: n/a  13. O2 SATURATION MONITOR:  \"Do you use an oxygen saturation monitor (pulse oximeter) at home?\" If Yes, ask \"What is your reading (oxygen level) today?\" \"What is your usual oxygen saturation reading?\" (e.g., 95%)        Does not have    Protocols used: Coronavirus (COVID-19) Diagnosed or [id number]    "

## 2024-09-03 NOTE — TELEPHONE ENCOUNTER
Pt notified of message from provider and will head to [facility] now.    Abril Kelly RN on 9/3/2024 at 2:46 PM

## 2024-09-03 NOTE — TELEPHONE ENCOUNTER
I agree with his chronic medical conditions that having a visit and discussing paxlovid is worthwhile but ultimately it will be up to him and how he is assessing his current symptoms

## 2024-10-03 DIAGNOSIS — N52.9 ERECTILE DYSFUNCTION, UNSPECIFIED ERECTILE DYSFUNCTION TYPE: ICD-10-CM

## 2024-10-03 RX ORDER — SILDENAFIL CITRATE 20 MG/1
TABLET ORAL
Qty: 30 TABLET | Refills: 2 | Status: SHIPPED | OUTPATIENT
Start: 2024-10-03

## 2024-10-15 ENCOUNTER — MYC MEDICAL ADVICE (OUTPATIENT)
Dept: FAMILY MEDICINE | Facility: CLINIC | Age: 69
End: 2024-10-15
Payer: MEDICARE

## 2024-10-15 DIAGNOSIS — I10 ESSENTIAL HYPERTENSION: ICD-10-CM

## 2024-10-15 RX ORDER — LOSARTAN POTASSIUM 50 MG/1
50 TABLET ORAL DAILY
Qty: 90 TABLET | Refills: 0 | Status: SHIPPED | OUTPATIENT
Start: 2024-10-15

## 2024-10-15 NOTE — TELEPHONE ENCOUNTER
Will forward to the refill pool.      Appointments in Next Year      Jan 16, 2025 10:00 AM  (Arrive by 9:40 AM)  MEDICARE ANNUAL WELLNESS VISIT with Cr Corley PA-C  [facility] ([facility] ) [phone]

## 2024-11-20 ENCOUNTER — MYC REFILL (OUTPATIENT)
Dept: FAMILY MEDICINE | Facility: CLINIC | Age: 69
End: 2024-11-20
Payer: MEDICARE

## 2024-11-20 DIAGNOSIS — F43.23 ADJUSTMENT DISORDER WITH MIXED ANXIETY AND DEPRESSED MOOD: ICD-10-CM

## 2024-11-20 RX ORDER — VENLAFAXINE HYDROCHLORIDE 150 MG/1
150 CAPSULE, EXTENDED RELEASE ORAL DAILY
Qty: 90 CAPSULE | Refills: 1 | Status: SHIPPED | OUTPATIENT
Start: 2024-11-20

## 2025-01-11 SDOH — HEALTH STABILITY: PHYSICAL HEALTH: ON AVERAGE, HOW MANY MINUTES DO YOU ENGAGE IN EXERCISE AT THIS LEVEL?: 30 MIN

## 2025-01-11 SDOH — HEALTH STABILITY: PHYSICAL HEALTH: ON AVERAGE, HOW MANY DAYS PER WEEK DO YOU ENGAGE IN MODERATE TO STRENUOUS EXERCISE (LIKE A BRISK WALK)?: 6 DAYS

## 2025-01-11 ASSESSMENT — SOCIAL DETERMINANTS OF HEALTH (SDOH): HOW OFTEN DO YOU GET TOGETHER WITH FRIENDS OR RELATIVES?: ONCE A WEEK

## 2025-01-15 ENCOUNTER — ANCILLARY PROCEDURE (OUTPATIENT)
Dept: CT IMAGING | Facility: CLINIC | Age: 70
End: 2025-01-15
Attending: THORACIC SURGERY (CARDIOTHORACIC VASCULAR SURGERY)
Payer: MEDICARE

## 2025-01-15 DIAGNOSIS — C41.9 CHONDROSARCOMA (H): ICD-10-CM

## 2025-01-15 PROCEDURE — 71250 CT THORAX DX C-: CPT

## 2025-01-16 ENCOUNTER — OFFICE VISIT (OUTPATIENT)
Dept: FAMILY MEDICINE | Facility: CLINIC | Age: 70
End: 2025-01-16
Attending: PHYSICIAN ASSISTANT
Payer: MEDICARE

## 2025-01-16 VITALS
RESPIRATION RATE: 16 BRPM | HEIGHT: 72 IN | SYSTOLIC BLOOD PRESSURE: 136 MMHG | DIASTOLIC BLOOD PRESSURE: 84 MMHG | TEMPERATURE: 97.9 F | HEART RATE: 67 BPM | BODY MASS INDEX: 31.41 KG/M2 | WEIGHT: 231.9 LBS | OXYGEN SATURATION: 100 %

## 2025-01-16 DIAGNOSIS — F43.23 ADJUSTMENT DISORDER WITH MIXED ANXIETY AND DEPRESSED MOOD: ICD-10-CM

## 2025-01-16 DIAGNOSIS — Z23 ENCOUNTER FOR IMMUNIZATION: ICD-10-CM

## 2025-01-16 DIAGNOSIS — I77.810 MILD ASCENDING AORTA DILATION: ICD-10-CM

## 2025-01-16 DIAGNOSIS — Z00.00 ENCOUNTER FOR MEDICARE ANNUAL WELLNESS EXAM: Primary | ICD-10-CM

## 2025-01-16 DIAGNOSIS — R94.6 THYROID FUNCTION TEST ABNORMAL: ICD-10-CM

## 2025-01-16 DIAGNOSIS — C41.3 CHONDROSARCOMA OF RIBS (H): ICD-10-CM

## 2025-01-16 LAB — TSH SERPL DL<=0.005 MIU/L-ACNC: 3.73 UIU/ML (ref 0.3–4.2)

## 2025-01-16 RX ORDER — ZINC GLUCONATE 50 MG
20 TABLET ORAL DAILY
COMMUNITY

## 2025-01-16 NOTE — PROGRESS NOTES
"Preventive Care Visit  [facility] LEO Corley PA-C, Family Medicine  Jan 16, 2025      Assessment & Plan     Encounter for Medicare annual wellness exam  Reviewed personal and family history. Reviewed age appropriate screenings. Recommended any needed vaccinations. He should return in a month or two to get the covid vaccine. Tdap at pharmacy   - INFLUENZA HIGH DOSE, TRIVALENT, PF (FLUZONE)    Chondrosarcoma of ribs (H)  Recent imaging was good with Dr. Sterling    Thyroid function test abnormal  Updating.   - TSH with free T4 reflex; Future  - TSH with free T4 reflex    Adjustment disorder with mixed anxiety and depressed mood  Continues on effexor 150mg. He does have symptoms if he stops it but his wife thinks he benefits a lot from it. Certainly improved mobility and pain 2/2 his cervical spine procedure has made a major difference as well. We discussed possible taper but for now he is ok and will let me know down the road if wanting to consider    Mild ascending aorta dilation  Stable/improved on most recent CT    Encounter for immunization  - INFLUENZA HIGH DOSE, TRIVALENT, PF (FLUZONE)            BMI  Estimated body mass index is 31.67 kg/m  as calculated from the following:    Height as of this encounter: 1.822 m (5' 11.75\").    Weight as of this encounter: 105.2 kg (231 lb 14.4 oz).       Counseling  Appropriate preventive services were addressed with this patient via screening, questionnaire, or discussion as appropriate for fall prevention, nutrition, physical activity, Tobacco-use cessation, social engagement, weight loss and cognition.  Checklist reviewing preventive services available has been given to the patient.  Reviewed patient's diet, addressing concerns and/or questions.   He is at risk for psychosocial distress and has been provided with information to reduce risk.   Discussed possible causes of fatigue. The patient was provided with written information regarding signs " of hearing loss.           Subjective   Alejo is a 69 year old, presenting for the following:  Annual Visit    Lev Cabral is a 69 year old male who presents today for annual wellness  He is now one year out from his cervical spine fusion  He is pleased that his leg strength/mobility is improving   -still considering lumbar procedures  He has started to get more active   -actually got to hunt and shot deer  Feels like mental health is definitely better  Still feels run down; poor sleep;         1/16/2025     9:36 AM   Additional Questions   Roomed by Kailey ARMSTRONG   Accompanied by self         1/16/2025     9:36 AM   Patient Reported Additional Medications   Patient reports taking the following new medications n/a           HPI      Health Care Directive  Patient does not have a Health Care Directive: Discussed advance care planning with patient; however, patient declined at this time.      1/11/2025   General Health   How would you rate your overall physical health? (!) FAIR   Feel stress (tense, anxious, or unable to sleep) Very much   (!) STRESS CONCERN      1/11/2025   Nutrition   Diet: Regular (no restrictions)         1/11/2025   Exercise   Days per week of moderate/strenous exercise 6 days   Average minutes spent exercising at this level 30 min         1/11/2025   Social Factors   Frequency of gathering with friends or relatives Once a week   Worry food won't last until get money to buy more No   Food not last or not have enough money for food? No   Do you have housing? (Housing is defined as stable permanent housing and does not include staying ouside in a car, in a tent, in an abandoned building, in an overnight shelter, or couch-surfing.) Yes   Are you worried about losing your housing? No   Lack of transportation? No   Unable to get utilities (heat,electricity)? No         1/16/2025   Fall Risk   Gait Speed Test (Document in seconds) 4   Gait Speed Test Interpretation Less than or equal to 5.00 seconds -  PASS          1/11/2025   Activities of Daily Living- Home Safety   Needs help with the following daily activites None of the above   Safety concerns in the home None of the above         1/11/2025   Dental   Dentist two times every year? Yes         1/11/2025   Hearing Screening   Hearing concerns? (!) IT'S HARD TO FOLLOW A CONVERSATION IN A NOISY RESTAURANT OR CROWDED ROOM.         1/11/2025   Driving Risk Screening   Patient/family members have concerns about driving No         1/11/2025   General Alertness/Fatigue Screening   Have you been more tired than usual lately? (!) YES         1/11/2025   Urinary Incontinence Screening   Bothered by leaking urine in past 6 months No         1/11/2025   TB Screening   Were you born outside of the [place]? No         Today's PHQ-2 Score:       1/16/2025     9:35 AM   PHQ-2 ( 1999 [employer])   Q1: Little interest or pleasure in doing things 1   Q2: Feeling down, depressed or hopeless 1   PHQ-2 Score 2    Q1: Little interest or pleasure in doing things Several days   Q2: Feeling down, depressed or hopeless Several days   PHQ-2 Score 2       Patient-reported           1/11/2025   Substance Use   Alcohol more than 3/day or more than 7/wk No   Do you have a current opioid prescription? No   How severe/bad is pain from 1 to 10? 1/10   Do you use any other substances recreationally? No     Social History     Tobacco Use    Smoking status: Never     Passive exposure: Never    Smokeless tobacco: Never   Vaping Use    Vaping status: Never Used   Substance Use Topics    Alcohol use: No     Alcohol/week: 0.0 standard drinks of alcohol    Drug use: No           1/11/2025   AAA Screening   Family history of Abdominal Aortic Aneurysm (AAA)? No   ASCVD Risk   The 10-year ASCVD risk score (Yokasta COLORADO, et al., 2019) is: 25.7%    Values used to calculate the score:      Age: 69 years      Sex: Male      Is Non- : No      Diabetic: No      Tobacco smoker: No       "Systolic Blood Pressure: 147 mmHg      Is BP treated: Yes      HDL Cholesterol: 48 mg/dL      Total Cholesterol: 189 mg/dL            Reviewed and updated as needed this visit by Provider                    Lab work is in process  Labs reviewed in EPIC  Current providers sharing in care for this patient include:  Patient Care Team:  Cr Corley PA-C as PCP - General (Physician Assistant)  Cr Corley PA-C as Assigned PCP    The following health maintenance items are reviewed in Epic and correct as of today:  Health Maintenance   Topic Date Due    RSV VACCINE (1 - Risk 60-74 years 1-dose series) Never done    DTAP/TDAP/TD IMMUNIZATION (2 - Td or Tdap) 05/01/2024    INFLUENZA VACCINE (1) 09/01/2024    COVID-19 Vaccine (7 - 2024-25 season) 09/01/2024    ANNUAL REVIEW OF HM ORDERS  01/08/2025    MEDICARE ANNUAL WELLNESS VISIT  01/08/2025    BMP  05/03/2025    FALL RISK ASSESSMENT  01/16/2026    GLUCOSE  05/03/2027    LIPID  01/08/2029    ADVANCE CARE PLANNING  01/08/2029    COLORECTAL CANCER SCREENING  02/22/2029    HEPATITIS C SCREENING  Completed    PHQ-2 (once per calendar year)  Completed    Pneumococcal Vaccine: 50+ Years  Completed    ZOSTER IMMUNIZATION  Completed    HPV IMMUNIZATION  Aged Out    MENINGITIS IMMUNIZATION  Aged Out    RSV MONOCLONAL ANTIBODY  Aged Out         Review of Systems  Constitutional, HEENT, cardiovascular, pulmonary, gi and gu systems are negative, except as otherwise noted.     Objective    Exam  BP (!) 147/95   Pulse 67   Temp 97.9  F (36.6  C) (Oral)   Resp 16   Ht 1.822 m (5' 11.75\")   Wt 105.2 kg (231 lb 14.4 oz)   SpO2 100%   BMI 31.67 kg/m     Estimated body mass index is 31.67 kg/m  as calculated from the following:    Height as of this encounter: 1.822 m (5' 11.75\").    Weight as of this encounter: 105.2 kg (231 lb 14.4 oz).    Physical Exam  GENERAL: alert and no distress  EYES: Eyes grossly normal to inspection, PERRL and conjunctivae and sclerae normal  HENT: ear " canals and TM's normal, nose and mouth without ulcers or lesions  NECK: no adenopathy, no asymmetry, masses, or scars  RESP: lungs clear to auscultation - no rales, rhonchi or wheezes  CV: regular rate and rhythm, normal S1 S2, no S3 or S4, no murmur, click or rub, no peripheral edema  ABDOMEN: soft, nontender, no hepatosplenomegaly, no masses and bowel sounds normal  MS: No peripheral edema   SKIN: well healed surgical scars   PSYCH: mentation appears normal, affect normal/bright         1/16/2025   Mini Cog   Clock Draw Score 2 Normal   3 Item Recall 3 objects recalled   Mini Cog Total Score 5              Signed Electronically by: Cr Corley PA-C

## 2025-01-16 NOTE — PATIENT INSTRUCTIONS
Patient Education   Preventive Care Advice   This is general advice given by our system to help you stay healthy. However, your care team may have specific advice just for you. Please talk to your care team about your preventive care needs.  Nutrition  Eat 5 or more servings of fruits and vegetables each day.  Try wheat bread, brown rice and whole grain pasta (instead of white bread, rice, and pasta).  Get enough calcium and vitamin D. Check the label on foods and aim for 100% of the RDA (recommended daily allowance).  Lifestyle  Exercise at least 150 minutes each week  (30 minutes a day, 5 days a week).  Do muscle strengthening activities 2 days a week. These help control your weight and prevent disease.  No smoking.  Wear sunscreen to prevent skin cancer.  Have a dental exam and cleaning every 6 months.  Yearly exams  See your health care team every year to talk about:  Any changes in your health.  Any medicines your care team has prescribed.  Preventive care, family planning, and ways to prevent chronic diseases.  Shots (vaccines)   HPV shots (up to age 26), if you've never had them before.  Hepatitis B shots (up to age 59), if you've never had them before.  COVID-19 shot: Get this shot when it's due.  Flu shot: Get a flu shot every year.  Tetanus shot: Get a tetanus shot every 10 years.  Pneumococcal, hepatitis A, and RSV shots: Ask your care team if you need these based on your risk.  Shingles shot (for age 50 and up)  General health tests  Diabetes screening:  Starting at age 35, Get screened for diabetes at least every 3 years.  If you are younger than age 35, ask your care team if you should be screened for diabetes.  Cholesterol test: At age 39, start having a cholesterol test every 5 years, or more often if advised.  Bone density scan (DEXA): At age 50, ask your care team if you should have this scan for osteoporosis (brittle bones).  Hepatitis C: Get tested at least once in your life.  STIs (sexually  transmitted infections)  Before age 24: Ask your care team if you should be screened for STIs.  After age 24: Get screened for STIs if you're at risk. You are at risk for STIs (including HIV) if:  You are sexually active with more than one person.  You don't use condoms every time.  You or a partner was diagnosed with a sexually transmitted infection.  If you are at risk for HIV, ask about PrEP medicine to prevent HIV.  Get tested for HIV at least once in your life, whether you are at risk for HIV or not.  Cancer screening tests  Cervical cancer screening: If you have a cervix, begin getting regular cervical cancer screening tests starting at age 21.  Breast cancer scan (mammogram): If you've ever had breasts, begin having regular mammograms starting at age 40. This is a scan to check for breast cancer.  Colon cancer screening: It is important to start screening for colon cancer at age 45.  Have a colonoscopy test every 10 years (or more often if you're at risk) Or, ask your provider about stool tests like a FIT test every year or Cologuard test every 3 years.  To learn more about your testing options, visit:   .  For help making a decision, visit:   https://bit.ly/dr85456.  Prostate cancer screening test: If you have a prostate, ask your care team if a prostate cancer screening test (PSA) at age 55 is right for you.  Lung cancer screening: If you are a current or former smoker ages 50 to 80, ask your care team if ongoing lung cancer screenings are right for you.  For informational purposes only. Not to replace the advice of your health care provider. Copyright   2023 [facility]. All rights reserved. Clinically reviewed by the [facility]. [employer] 224855 - REV 01/24.  Preventing Falls: Care Instructions  Injuries and health problems such as trouble walking or poor eyesight can increase your risk of falling. So can some medicines. But there are things you can do to help  "prevent falls. You can exercise to get stronger. You can also arrange your home to make it safer.    Talk to your doctor about the medicines you take. Ask if any of them increase the risk of falls and whether they can be changed or stopped.   Try to exercise regularly. It can help improve your strength and balance. This can help lower your risk of falling.         Practice fall safety and prevention.   Wear low-heeled shoes that fit well and give your feet good support. Talk to your doctor if you have foot problems that make this hard.  Carry a cellphone or wear a medical alert device that you can use to call for help.  Use stepladders instead of chairs to reach high objects. Don't climb if you're at risk for falls. Ask for help, if needed.  Wear the correct eyeglasses, if you need them.        Make your home safer.   Remove rugs, cords, clutter, and furniture from walkways.  Keep your house well lit. Use night-lights in hallways and bathrooms.  Install and use sturdy handrails on stairways.  Wear nonskid footwear, even inside. Don't walk barefoot or in socks without shoes.        Be safe outside.   Use handrails, curb cuts, and ramps whenever possible.  Keep your hands free by using a shoulder bag or backpack.  Try to walk in well-lit areas. Watch out for uneven ground, changes in pavement, and debris.  Be careful in the winter. Walk on the grass or gravel when sidewalks are slippery. Use de-icer on steps and walkways. Add non-slip devices to shoes.    Put grab bars and nonskid mats in your shower or tub and near the toilet. Try to use a shower chair or bath bench when bathing.   Get into a tub or shower by putting in your weaker leg first. Get out with your strong side first. Have a phone or medical alert device in the bathroom with you.   Where can you learn more?  Go to https://www.Telogiswise.net/patiented  Enter G117 in the search box to learn more about \"Preventing Falls: Care Instructions.\"  Current as of: " July 31, 2024  Content Version: 14.3    2024 [employer].   Care instructions adapted under license by your healthcare professional. If you have questions about a medical condition or this instruction, always ask your healthcare professional. [employer] disclaims any warranty or liability for your use of this information.    Learning About Stress  What is stress?     Stress is your body's response to a hard situation. Your body can have a physical, emotional, or mental response. Stress is a fact of life for most people, and it affects everyone differently. What causes stress for you may not be stressful for someone else.  A lot of things can cause stress. You may feel stress when you go on a job interview, take a test, or run a race. This kind of short-term stress is normal and even useful. It can help you if you need to work hard or react quickly. For example, stress can help you finish an important job on time.  Long-term stress is caused by ongoing stressful situations or events. Examples of long-term stress include long-term health problems, ongoing problems at work, or conflicts in your family. Long-term stress can harm your health.  How does stress affect your health?  When you are stressed, your body responds as though you are in danger. It makes hormones that speed up your heart, make you breathe faster, and give you a burst of energy. This is called the fight-or-flight stress response. If the stress is over quickly, your body goes back to normal and no harm is done.  But if stress happens too often or lasts too long, it can have bad effects. Long-term stress can make you more likely to get sick, and it can make symptoms of some diseases worse. If you tense up when you are stressed, you may develop neck, shoulder, or low back pain. Stress is linked to high blood pressure and heart disease.  Stress also harms your emotional health. It can make you wesley, tense, or depressed. Your  relationships may suffer, and you may not do well at work or school.  What can you do to manage stress?  You can try these things to help manage stress:   Do something active. Exercise or activity can help reduce stress. Walking is a great way to get started. Even everyday activities such as housecleaning or yard work can help.  Try yoga or robert chi. These techniques combine exercise and meditation. You may need some training at first to learn them.  Do something you enjoy. For example, listen to music or go to a movie. Practice your hobby or do volunteer work.  Meditate. This can help you relax, because you are not worrying about what happened before or what may happen in the future.  Do guided imagery. Imagine yourself in any setting that helps you feel calm. You can use online videos, books, or a teacher to guide you.  Do breathing exercises. For example:  From a standing position, bend forward from the waist with your knees slightly bent. Let your arms dangle close to the floor.  Breathe in slowly and deeply as you return to a standing position. Roll up slowly and lift your head last.  Hold your breath for just a few seconds in the standing position.  Breathe out slowly and bend forward from the waist.  Let your feelings out. Talk, laugh, cry, and express anger when you need to. Talking with supportive friends or family, a counselor, or a raul leader about your feelings is a healthy way to relieve stress. Avoid discussing your feelings with people who make you feel worse.  Write. It may help to write about things that are bothering you. This helps you find out how much stress you feel and what is causing it. When you know this, you can find better ways to cope.  What can you do to prevent stress?  You might try some of these things to help prevent stress:  Manage your time. This helps you find time to do the things you want and need to do.  Get enough sleep. Your body recovers from the stresses of the day while  "you are sleeping.  Get support. Your family, friends, and community can make a difference in how you experience stress.  Limit your news feed. Avoid or limit time on social media or news that may make you feel stressed.  Do something active. Exercise or activity can help reduce stress. Walking is a great way to get started.  Where can you learn more?  Go to https://www.Verbling.net/patiented  Enter N032 in the search box to learn more about \"Learning About Stress.\"  Current as of: October 24, 2023  Content Version: 14.3    2024 [employer].   Care instructions adapted under license by your healthcare professional. If you have questions about a medical condition or this instruction, always ask your healthcare professional. [employer] disclaims any warranty or liability for your use of this information.    Learning About Sleeping Well  What does sleeping well mean?     Sleeping well means getting enough sleep to feel good and stay healthy. How much sleep is enough varies among people.  The number of hours you sleep and how you feel when you wake up are both important. If you do not feel refreshed, you probably need more sleep. Another sign of not getting enough sleep is feeling tired during the day.  Experts recommend that adults get at least 7 or more hours of sleep per day. Children and older adults need more sleep.  Why is getting enough sleep important?  Getting enough quality sleep is a basic part of good health. When your sleep suffers, your physical health, mood, and your thoughts can suffer too. You may find yourself feeling more grumpy or stressed. Not getting enough sleep also can lead to serious problems, including injury, accidents, anxiety, and depression.  What might cause poor sleeping?  Many things can cause sleep problems, including:  Changes to your sleep schedule.  Stress. Stress can be caused by fear about a single event, such as giving a speech. Or you may have ongoing " "stress, such as worry about work or school.  Depression, anxiety, and other mental or emotional conditions.  Changes in your sleep habits or surroundings. This includes changes that happen where you sleep, such as noise, light, or sleeping in a different bed. It also includes changes in your sleep pattern, such as having jet lag or working a late shift.  Health problems, such as pain, breathing problems, and restless legs syndrome.  Lack of regular exercise.  Using alcohol, nicotine, or caffeine before bed.  How can you help yourself?  Here are some tips that may help you sleep more soundly and wake up feeling more refreshed.  Your sleeping area   Use your bedroom only for sleeping and sex. A bit of light reading may help you fall asleep. But if it doesn't, do your reading elsewhere in the house. Try not to use your TV, computer, smartphone, or tablet while you are in bed.  Be sure your bed is big enough to stretch out comfortably, especially if you have a sleep partner.  Keep your bedroom quiet, dark, and cool. Use curtains, blinds, or a sleep mask to block out light. To block out noise, use earplugs, soothing music, or a \"white noise\" machine.  Your evening and bedtime routine   Create a relaxing bedtime routine. You might want to take a warm shower or bath, or listen to soothing music.  Go to bed at the same time every night. And get up at the same time every morning, even if you feel tired.  What to avoid   Limit caffeine (coffee, tea, caffeinated sodas) during the day, and don't have any for at least 6 hours before bedtime.  Avoid drinking alcohol before bedtime. Alcohol can cause you to wake up more often during the night.  Try not to smoke or use tobacco, especially in the evening. Nicotine can keep you awake.  Limit naps during the day, especially close to bedtime.  Avoid lying in bed awake for too long. If you can't fall asleep or if you wake up in the middle of the night and can't get back to sleep within " "about 20 minutes, get out of bed and go to another room until you feel sleepy.  Avoid taking medicine right before bed that may keep you awake or make you feel hyper or energized. Your doctor can tell you if your medicine may do this and if you can take it earlier in the day.  If you can't sleep   Imagine yourself in a peaceful, pleasant scene. Focus on the details and feelings of being in a place that is relaxing.  Get up and do a quiet or boring activity until you feel sleepy.  Avoid drinking any liquids before going to bed to help prevent waking up often to use the bathroom.  Where can you learn more?  Go to https://www.Aventeon.net/patiented  Enter J942 in the search box to learn more about \"Learning About Sleeping Well.\"  Current as of: July 31, 2024  Content Version: 14.3    2024 "[employer].   Care instructions adapted under license by your healthcare professional. If you have questions about a medical condition or this instruction, always ask your healthcare professional. "[employer] disclaims any warranty or liability for your use of this information.       "

## 2025-01-21 ENCOUNTER — TELEPHONE (OUTPATIENT)
Dept: FAMILY MEDICINE | Facility: CLINIC | Age: 70
End: 2025-01-21
Payer: MEDICARE

## 2025-01-21 DIAGNOSIS — Z00.00 ENCOUNTER FOR MEDICARE ANNUAL WELLNESS EXAM: Primary | ICD-10-CM

## 2025-01-21 NOTE — TELEPHONE ENCOUNTER
Cr Corley PA-C   Patient had AWV completed on 1/16/2025, he did not get the labs ordered that he typically does   Only TSH completed   Would you like to order CBC, BMP, Lipid panel ?   Patient has family history of DM, and would like to see glucose levels     RN spoke to patient  Wondering about recent lab results, he is only able to see TSH   RN advised this was the only lab ordered that day   Patient states he typically gets a lot of screening labs and he did not get those this time and would like to have them completed he is 69 and would like to monitor cholesterol and glucose     RN advised will send message to provider to see if able to place lab orders and then we can schedule lab only appt   We will let him know recommendations after provider reviews     Anneliees Lemus, Registered Nurse  [facility]

## 2025-02-17 DIAGNOSIS — N52.9 ERECTILE DYSFUNCTION, UNSPECIFIED ERECTILE DYSFUNCTION TYPE: ICD-10-CM

## 2025-02-17 RX ORDER — SILDENAFIL CITRATE 20 MG/1
TABLET ORAL
Qty: 30 TABLET | Refills: 2 | Status: SHIPPED | OUTPATIENT
Start: 2025-02-17

## 2025-02-21 ENCOUNTER — HOSPITAL ENCOUNTER (EMERGENCY)
Facility: CLINIC | Age: 70
Discharge: HOME | End: 2025-02-22
Attending: EMERGENCY MEDICINE | Admitting: EMERGENCY MEDICINE
Payer: MEDICARE

## 2025-02-21 ENCOUNTER — ORDERS ONLY (AUTO-RELEASED) (OUTPATIENT)
Dept: EMERGENCY MEDICINE | Facility: CLINIC | Age: 70
End: 2025-02-21

## 2025-02-21 VITALS
WEIGHT: 233.25 LBS | OXYGEN SATURATION: 95 % | SYSTOLIC BLOOD PRESSURE: 111 MMHG | TEMPERATURE: 97.3 F | DIASTOLIC BLOOD PRESSURE: 69 MMHG | RESPIRATION RATE: 13 BRPM | BODY MASS INDEX: 31.85 KG/M2 | HEART RATE: 59 BPM

## 2025-02-21 DIAGNOSIS — I48.0 PAROXYSMAL ATRIAL FIBRILLATION WITH RVR (H): ICD-10-CM

## 2025-02-21 LAB
ANION GAP SERPL CALCULATED.3IONS-SCNC: 12 MMOL/L (ref 7–15)
BASOPHILS # BLD AUTO: 0 10E3/UL (ref 0–0.2)
BASOPHILS NFR BLD AUTO: 0 %
BUN SERPL-MCNC: 30.4 MG/DL (ref 8–23)
CALCIUM SERPL-MCNC: 9.3 MG/DL (ref 8.8–10.4)
CHLORIDE SERPL-SCNC: 104 MMOL/L (ref 98–107)
CREAT SERPL-MCNC: 0.85 MG/DL (ref 0.67–1.17)
EGFRCR SERPLBLD CKD-EPI 2021: >90 ML/MIN/1.73M2
EOSINOPHIL # BLD AUTO: 0.2 10E3/UL (ref 0–0.7)
EOSINOPHIL NFR BLD AUTO: 3 %
ERYTHROCYTE [DISTWIDTH] IN BLOOD BY AUTOMATED COUNT: 13.1 % (ref 10–15)
GLUCOSE SERPL-MCNC: 142 MG/DL (ref 70–99)
HCO3 SERPL-SCNC: 22 MMOL/L (ref 22–29)
HCT VFR BLD AUTO: 49.2 % (ref 40–53)
HGB BLD-MCNC: 16.9 G/DL (ref 13.3–17.7)
HOLD SPECIMEN: NORMAL
HOLD SPECIMEN: NORMAL
IMM GRANULOCYTES # BLD: 0 10E3/UL
IMM GRANULOCYTES NFR BLD: 0 %
LYMPHOCYTES # BLD AUTO: 1.8 10E3/UL (ref 0.8–5.3)
LYMPHOCYTES NFR BLD AUTO: 26 %
MAGNESIUM SERPL-MCNC: 1.9 MG/DL (ref 1.7–2.3)
MCH RBC QN AUTO: 30.5 PG (ref 26.5–33)
MCHC RBC AUTO-ENTMCNC: 34.3 G/DL (ref 31.5–36.5)
MCV RBC AUTO: 89 FL (ref 78–100)
MONOCYTES # BLD AUTO: 0.7 10E3/UL (ref 0–1.3)
MONOCYTES NFR BLD AUTO: 10 %
NEUTROPHILS # BLD AUTO: 4.4 10E3/UL (ref 1.6–8.3)
NEUTROPHILS NFR BLD AUTO: 61 %
NRBC # BLD AUTO: 0 10E3/UL
NRBC BLD AUTO-RTO: 0 /100
PLATELET # BLD AUTO: 208 10E3/UL (ref 150–450)
POTASSIUM SERPL-SCNC: 4 MMOL/L (ref 3.4–5.3)
RBC # BLD AUTO: 5.55 10E6/UL (ref 4.4–5.9)
SODIUM SERPL-SCNC: 138 MMOL/L (ref 135–145)
T4 FREE SERPL-MCNC: 0.97 NG/DL (ref 0.9–1.7)
TROPONIN T SERPL HS-MCNC: 26 NG/L
TROPONIN T SERPL HS-MCNC: 28 NG/L
TSH SERPL DL<=0.005 MIU/L-ACNC: 5.69 UIU/ML (ref 0.3–4.2)
WBC # BLD AUTO: 7.1 10E3/UL (ref 4–11)

## 2025-02-21 PROCEDURE — 93005 ELECTROCARDIOGRAM TRACING: CPT | Mod: 76

## 2025-02-21 PROCEDURE — 80048 BASIC METABOLIC PNL TOTAL CA: CPT | Performed by: EMERGENCY MEDICINE

## 2025-02-21 PROCEDURE — 84439 ASSAY OF FREE THYROXINE: CPT | Performed by: EMERGENCY MEDICINE

## 2025-02-21 PROCEDURE — 36415 COLL VENOUS BLD VENIPUNCTURE: CPT | Performed by: EMERGENCY MEDICINE

## 2025-02-21 PROCEDURE — 82310 ASSAY OF CALCIUM: CPT | Performed by: EMERGENCY MEDICINE

## 2025-02-21 PROCEDURE — 250N000013 HC RX MED GY IP 250 OP 250 PS 637: Performed by: EMERGENCY MEDICINE

## 2025-02-21 PROCEDURE — 85004 AUTOMATED DIFF WBC COUNT: CPT | Performed by: EMERGENCY MEDICINE

## 2025-02-21 PROCEDURE — 84443 ASSAY THYROID STIM HORMONE: CPT | Performed by: EMERGENCY MEDICINE

## 2025-02-21 PROCEDURE — 84484 ASSAY OF TROPONIN QUANT: CPT | Performed by: EMERGENCY MEDICINE

## 2025-02-21 PROCEDURE — 93005 ELECTROCARDIOGRAM TRACING: CPT

## 2025-02-21 PROCEDURE — 99284 EMERGENCY DEPT VISIT MOD MDM: CPT

## 2025-02-21 PROCEDURE — 83735 ASSAY OF MAGNESIUM: CPT | Performed by: EMERGENCY MEDICINE

## 2025-02-21 RX ADMIN — RIVAROXABAN 20 MG: 10 TABLET, FILM COATED ORAL at 23:54

## 2025-02-21 ASSESSMENT — COLUMBIA-SUICIDE SEVERITY RATING SCALE - C-SSRS
6. HAVE YOU EVER DONE ANYTHING, STARTED TO DO ANYTHING, OR PREPARED TO DO ANYTHING TO END YOUR LIFE?: NO
1. IN THE PAST MONTH, HAVE YOU WISHED YOU WERE DEAD OR WISHED YOU COULD GO TO SLEEP AND NOT WAKE UP?: NO
2. HAVE YOU ACTUALLY HAD ANY THOUGHTS OF KILLING YOURSELF IN THE PAST MONTH?: NO

## 2025-02-21 ASSESSMENT — ACTIVITIES OF DAILY LIVING (ADL)
ADLS_ACUITY_SCORE: 58

## 2025-02-22 NOTE — ED PROVIDER NOTES
Emergency Department Note      History of Present Illness     Chief Complaint   Palpitations     HPI   Lev Cabral is a 69 year old male with history of hypertension who presents to the ED with his wife for evaluation of palpitations. The patient reports that he was eating sherbet ice cream this evening and an hour later around 1830, he developed palpitations and some chest pressure. During this time he also developed a sudden headache, which radiated into his left jaw. The palpitations only lasted for a couple hours, and they resolved around 2000. His heart rate is normally at 52 but when he checked it at home it was in the 140's. Patient mentions he was here 6 months ago for similar symptoms that presented after eating sherbet ice cream. After this visit, he wore a heart monitor for 2 weeks but the results were normal. Denies shortness of breath or fever. Includes that he had a productive cough for 2 weeks, but this has been gone for the past week. Denies alcohol use or recent medication changes.    Independent Historian   None    Review of External Notes   None    Past Medical History     Medical History and Problem List   Adjustment reaction  Anxiety   Cervical spinal stenosis   Cervicalgia   Chondrosarcoma of ribs  Depression   Hypertension  Mild ascending aorta dilation   Peyronie's disease  Sleep apnea   Thoracic aortic ectasia     Medications   Cozaar  Effexor  Prilosec   Revatio     Surgical History   Appendectomy  Back surgery  Colonoscopy x2  Colonoscopy with polypectomies  Laminoplasty cervical C3-C6  Left knee arthroscopy   Neck fusion, 3 level   Reconstruction of left anterior chest wall mass and reconstruction with Gor-Duglas soft tissue patch, primary repair of diaphragm  Spine surgery   Tonsillectomy   Vasectomy     Physical Exam     Patient Vitals for the past 24 hrs:   BP Temp Temp src Pulse Resp SpO2 Weight   02/21/25 2327 -- -- -- -- -- 95 % --   02/21/25 2312 135/87 -- -- 62 16 -- --    02/21/25 2309 -- -- -- 62 15 92 % --   02/21/25 2257 122/79 -- -- 63 15 94 % --   02/21/25 2246 -- -- -- -- 14 -- --   02/21/25 2245 123/80 -- -- 65 -- 94 % --   02/21/25 2155 -- -- -- 65 12 -- --   02/21/25 2140 134/81 -- -- 72 10 -- --   02/21/25 2130 123/87 -- -- 73 15 -- --   02/21/25 2116 -- -- -- 68 16 93 % --   02/21/25 2115 124/79 -- -- 69 -- -- --   02/21/25 2105 122/86 -- -- 74 13 92 % --   02/21/25 2056 133/88 -- -- 72 18 96 % --   02/21/25 2055 -- -- -- 72 19 95 % --   02/21/25 2033 (!) 131/102 -- -- -- -- -- --   02/21/25 2031 -- 97.3  F (36.3  C) Temporal (!) 136 16 98 % 105.8 kg (233 lb 4 oz)     Physical Exam  General: Alert, no acute distress  HEENT:  Moist mucous membranes. Conjunctiva normal.   CV:  RRR, no m/r/g, skin warm and well perfused  Pulm:  CTAB, no wheezes/ronchi/rales.  No acute distress, breathing comfortably  GI:  Soft, nontender, nondistended.  No rebound or guarding.    MSK:  Moving all extremities.  No focal areas of edema, erythema  Skin:  WWP, no rashes, no lower extremity edema, skin color normal, no diaphoresis  Psych:  Well-appearing, normal affect, regular speech    Diagnostics     Lab Results   Labs Ordered and Resulted from Time of ED Arrival to Time of ED Departure   BASIC METABOLIC PANEL - Abnormal       Result Value    Sodium 138      Potassium 4.0      Chloride 104      Carbon Dioxide (CO2) 22      Anion Gap 12      Urea Nitrogen 30.4 (*)     Creatinine 0.85      GFR Estimate >90      Calcium 9.3      Glucose 142 (*)    TROPONIN T, HIGH SENSITIVITY - Abnormal    Troponin T, High Sensitivity 26 (*)    TSH WITH FREE T4 REFLEX - Abnormal    TSH 5.69 (*)    TROPONIN T, HIGH SENSITIVITY - Abnormal    Troponin T, High Sensitivity 28 (*)    MAGNESIUM - Normal    Magnesium 1.9     T4 FREE - Normal    Free T4 0.97     CBC WITH PLATELETS AND DIFFERENTIAL    WBC Count 7.1      RBC Count 5.55      Hemoglobin 16.9      Hematocrit 49.2      MCV 89      MCH 30.5      MCHC 34.3       RDW 13.1      Platelet Count 208      % Neutrophils 61      % Lymphocytes 26      % Monocytes 10      % Eosinophils 3      % Basophils 0      % Immature Granulocytes 0      NRBCs per 100 WBC 0      Absolute Neutrophils 4.4      Absolute Lymphocytes 1.8      Absolute Monocytes 0.7      Absolute Eosinophils 0.2      Absolute Basophils 0.0      Absolute Immature Granulocytes 0.0      Absolute NRBCs 0.0       Imaging   No orders to display       EKG #1  ECG taken at 2041, ECG read at 2047  Atrial fibrillation with rapid ventricular response  Incomplete right bundle branch block    Rate 122 bpm. FL interval * ms. QRS duration 102 ms. QT/QTc 286/407 ms. P-R-T axes * 71 37.    EKG #2  ECG taken at 2116, ECG read at 2121  Normal sinus rhythm  Incomplete right bundle branch block    Rate 70 bpm. FL interval 190 ms. QRS duration 106 ms. QT/QTc 374/403 ms. P-R-T axes 46 58 41.     Independent Interpretation   None    ED Course      Medications Administered   Medications   rivaroxaban ANTICOAGULANT (XARELTO) tablet 20 mg (has no administration in time range)       Procedures   Procedures     Discussion of Management   None    ED Course   ED Course as of 02/21/25 2335 Fri Feb 21, 2025 2129 I obtained history and examined the patient as noted above.      Additional Documentation  None    Medical Decision Making / Diagnosis     CMS Diagnoses: None    MIPS       None    [facility]   Lev Cabral is a 69 year old male with history of hypertension presenting to the emergency department for evaluation of palpitations that started this evening.  Please see above for the details in the HPI and exam.  Patient noted to be tachycardic to 136-140 on arrival.  Screening EKG shows A-fib with RVR, no acute ischemic appearing changes.  Reports similar episode last year with palpitations and negative EKG and external cardiac monitor evaluation but no prior history of A-fib.  Patient converted to sinus rhythm in the emergency department without  any medications or interventions in the ER.  Repeat EKG shows sinus rhythm with known incomplete RBBB without acute ischemic appearing changes.  High-sensitivity troponin and 2-hour delta troponin are detectable but flat and patient denies any chest pain.  Doubt ACS at this time.  The rest of the patient basic lab studies above are unremarkable.  No severe anemia or severe electrolyte/metabolic derangement.  TSH is elevated but free T4 is negative.  Patient remains hemodynamically stable and asymptomatic after self converting to sinus rhythm.  With reasonable clinical certainty, I do feel that the patient is safe to discharge home.  [id number] score is 2 in which shared decision making of risk/benefits, patient elected to start DOAC Xarelto.  Will have the patient follow-up closely with cardiology regarding his presentation today.  We will have the patient wear external cardiac monitoring for another 14 days.  Discussed signs/symptoms that should prompt the patient's urgent return to the emergency department.  All questions answered prior to discharge.    Disposition   The patient was discharged.     Diagnosis     ICD-10-CM    1. Paroxysmal atrial fibrillation with RVR (H)  I48.0 ZIO PATCH MAIL OUT     Adult Cardiology Eval  Referral     CANCELED: Adult Cardiology Eval  Referral         Discharge Medications   New Prescriptions    RIVAROXABAN ANTICOAGULANT (XARELTO) 20 MG TABS TABLET    Take 1 tablet (20 mg) by mouth daily (with dinner).     Scribe Disclosure:  LISA LANE, am serving as a scribe at 9:25 PM on 2/21/2025 to document services personally performed by Jarrod Galarza MD based on my observations and the provider's statements to me.      Jarrod Galarza MD  02/21/25 [id number]

## 2025-02-22 NOTE — DISCHARGE INSTRUCTIONS
Please follow up with cardiology regarding your ER visit today.    Return to the ER if you develop any chest pain, SOB, or any new or worsening symptom.

## 2025-02-24 LAB
ATRIAL RATE - MUSE: 70 BPM
ATRIAL RATE - MUSE: NORMAL BPM
DIASTOLIC BLOOD PRESSURE - MUSE: NORMAL MMHG
DIASTOLIC BLOOD PRESSURE - MUSE: NORMAL MMHG
INTERPRETATION ECG - MUSE: NORMAL
INTERPRETATION ECG - MUSE: NORMAL
P AXIS - MUSE: 46 DEGREES
P AXIS - MUSE: NORMAL DEGREES
PR INTERVAL - MUSE: 190 MS
PR INTERVAL - MUSE: NORMAL MS
QRS DURATION - MUSE: 102 MS
QRS DURATION - MUSE: 106 MS
QT - MUSE: 286 MS
QT - MUSE: 374 MS
QTC - MUSE: 403 MS
QTC - MUSE: 407 MS
R AXIS - MUSE: 58 DEGREES
R AXIS - MUSE: 71 DEGREES
SYSTOLIC BLOOD PRESSURE - MUSE: NORMAL MMHG
SYSTOLIC BLOOD PRESSURE - MUSE: NORMAL MMHG
T AXIS - MUSE: 37 DEGREES
T AXIS - MUSE: 41 DEGREES
VENTRICULAR RATE- MUSE: 122 BPM
VENTRICULAR RATE- MUSE: 70 BPM

## 2025-02-27 ENCOUNTER — OFFICE VISIT (OUTPATIENT)
Dept: CARDIOLOGY | Facility: CLINIC | Age: 70
End: 2025-02-27
Attending: EMERGENCY MEDICINE
Payer: MEDICARE

## 2025-02-27 VITALS
WEIGHT: 228 LBS | HEART RATE: 58 BPM | DIASTOLIC BLOOD PRESSURE: 91 MMHG | SYSTOLIC BLOOD PRESSURE: 161 MMHG | BODY MASS INDEX: 30.88 KG/M2 | HEIGHT: 72 IN

## 2025-02-27 DIAGNOSIS — I10 BENIGN ESSENTIAL HYPERTENSION: ICD-10-CM

## 2025-02-27 DIAGNOSIS — I48.0 PAROXYSMAL ATRIAL FIBRILLATION WITH RVR (H): Primary | ICD-10-CM

## 2025-02-27 DIAGNOSIS — R07.9 CHEST PAIN, UNSPECIFIED TYPE: ICD-10-CM

## 2025-02-27 DIAGNOSIS — G47.33 OSA (OBSTRUCTIVE SLEEP APNEA): ICD-10-CM
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
## 2025-03-11 ENCOUNTER — MYC MEDICAL ADVICE (OUTPATIENT)
Dept: FAMILY MEDICINE | Facility: CLINIC | Age: 70
End: 2025-03-11

## 2025-03-11 ENCOUNTER — HOSPITAL ENCOUNTER (OUTPATIENT)
Dept: CARDIOLOGY | Facility: CLINIC | Age: 70
Discharge: HOME | End: 2025-03-11
Attending: INTERNAL MEDICINE
Payer: MEDICARE

## 2025-03-11 ENCOUNTER — MYC REFILL (OUTPATIENT)
Dept: FAMILY MEDICINE | Facility: CLINIC | Age: 70
End: 2025-03-11
Payer: MEDICARE

## 2025-03-11 DIAGNOSIS — R07.9 CHEST PAIN, UNSPECIFIED TYPE: ICD-10-CM

## 2025-03-11 DIAGNOSIS — F43.23 ADJUSTMENT DISORDER WITH MIXED ANXIETY AND DEPRESSED MOOD: ICD-10-CM

## 2025-03-11 DIAGNOSIS — I48.0 PAROXYSMAL ATRIAL FIBRILLATION WITH RVR (H): ICD-10-CM

## 2025-03-11 PROCEDURE — 93325 DOPPLER ECHO COLOR FLOW MAPG: CPT | Mod: TC

## 2025-03-11 PROCEDURE — 255N000002 HC RX 255 OP 636: Performed by: INTERNAL MEDICINE

## 2025-03-11 RX ORDER — VENLAFAXINE HYDROCHLORIDE 150 MG/1
150 CAPSULE, EXTENDED RELEASE ORAL DAILY
Qty: 90 CAPSULE | Refills: 1 | OUTPATIENT
Start: 2025-03-11

## 2025-03-11 RX ADMIN — HUMAN ALBUMIN MICROSPHERES AND PERFLUTREN 3 ML: 10; .22 INJECTION, SOLUTION INTRAVENOUS at 09:17

## 2025-03-19 ENCOUNTER — HOSPITAL ENCOUNTER (OUTPATIENT)
Dept: CARDIOLOGY | Facility: CLINIC | Age: 70
Discharge: HOME | End: 2025-03-19
Attending: INTERNAL MEDICINE
Payer: MEDICARE

## 2025-03-19 DIAGNOSIS — R07.9 CHEST PAIN, UNSPECIFIED TYPE: ICD-10-CM

## 2025-03-19 DIAGNOSIS — I48.0 PAROXYSMAL ATRIAL FIBRILLATION WITH RVR (H): ICD-10-CM

## 2025-03-19 LAB
CV ZIO PRELIM RESULTS: NORMAL
LVEF ECHO: NORMAL

## 2025-03-19 PROCEDURE — 93306 TTE W/DOPPLER COMPLETE: CPT

## 2025-03-25 ENCOUNTER — TELEPHONE (OUTPATIENT)
Dept: FAMILY MEDICINE | Facility: CLINIC | Age: 70
End: 2025-03-25
Payer: MEDICARE

## 2025-03-25 NOTE — TELEPHONE ENCOUNTER
Patient Quality Outreach    Patient is due for the following:   Hypertension -  BP check    Action(s) Taken:   Schedule a nurse only visit for BP check    Type of outreach:    Sent thrdPlace message.    Questions for provider review:    None         Yokasta Murillo  Chart routed to .

## 2025-04-09 ENCOUNTER — OFFICE VISIT (OUTPATIENT)
Dept: DERMATOLOGY | Facility: CLINIC | Age: 70
End: 2025-04-09
Payer: MEDICARE

## 2025-04-09 DIAGNOSIS — L81.4 LENTIGO: Primary | ICD-10-CM

## 2025-04-09 DIAGNOSIS — D22.9 MULTIPLE NEVI: ICD-10-CM

## 2025-04-09 DIAGNOSIS — L82.1 SEBORRHEIC KERATOSIS: ICD-10-CM

## 2025-04-09 DIAGNOSIS — L28.1 PRURIGO NODULARIS: ICD-10-CM

## 2025-04-09 DIAGNOSIS — L57.0 ACTINIC KERATOSIS: ICD-10-CM

## 2025-04-09 ASSESSMENT — PAIN SCALES - GENERAL: PAINLEVEL_OUTOF10: NO PAIN (0)

## 2025-04-09 NOTE — PATIENT INSTRUCTIONS
Cryotherapy    What is it?  Use of a very cold liquid, such as liquid nitrogen, to freeze and destroy abnormal skin cells that need to be removed    What should I expect?  Tenderness and redness  A small blister that might grow and fill with dark purple blood. There may be crusting.  More than one treatment may be needed if the lesions do not go away.    How do I care for the treated area?  Gently wash the area with your hands when bathing.  Use a thin layer of Vaseline to help with healing. You may use a Band-Aid.   The area should heal within 7-10 days and may leave behind a pink or lighter color.   Do not use an antibiotic or Neosporin ointment.   You may take acetaminophen (Tylenol) for pain.     Call your doctor if you have:  Severe pain  Signs of infection (warmth, redness, cloudy yellow drainage, and or a bad smell)  Questions or concerns    Who should I call with questions?      [facility]: [phone]      [facility]: [phone]      For urgent needs outside of business hours call the [facility] at [phone] and ask for the dermatology resident on call     Patient Education       Proper skin care from [facility]:    -Eliminate harsh soaps as they strip the natural oils from the skin, often resulting in dry itchy skin ( i.e. Dial, Zest, Geetha Spring)  -Use mild soaps such as Cetaphil or Dove Sensitive Skin in the shower. You do not need to use soap on arms, legs, and trunk every time you shower unless visibly soiled.   -Avoid hot or cold showers.  -After showering, lightly dry off and apply moisturizing within 2-3 minutes. This will help trap moisture in the skin.   -Aggressive use of a moisturizer at least 1-2 times a day to the entire body (including -Vanicream, Cetaphil, Aquaphor or Cerave) and moisturize hands after every washing.  -We recommend using moisturizers that come in a tub that needs to be scooped out, not a  pump. This has more of an oil base. It will hold moisture in your skin much better than a water base moisturizer. The above recommended are non-pore clogging.      Wear a sunscreen with at least SPF 30 on your face, ears, neck and V of the chest daily. Wear sunscreen on other areas of the body if those areas are exposed to the sun throughout the day. Sunscreens can contain physical and/or chemical blockers. Physical blockers are less likely to clog pores, these include zinc oxide and titanium dioxide. Reapply every two hour and after swimming.     Sunscreen examples: https://www.ewg.org/sunscreen/    UV radiation  UVA radiation remains constant throughout the day and throughout the year. It is a longer wavelength than UVB and therefore penetrates deeper into the skin leading to immediate and delayed tanning, photoaging, and skin cancer. 70-80% of UVA and UVB radiation occurs between the hours of 10am-2pm.  UVB radiation  UVB radiation causes the most harmful effects and is more significant during the summer months. However, snow and ice can reflect UVB radiation leading to skin damage during the winter months as well. UVB radiation is responsible for tanning, burning, inflammation, delayed erythema (pinkness), pigmentation (brown spots), and skin cancer.     I recommend self monthly full body exams and yearly full body exams with a dermatology provider. If you develop a new or changing lesion please follow up for examination. Most skin cancers are pink and scaly or pink and pearly. However, we do see blue/brown/black skin cancers.  Consider the ABCDEs of melanoma when giving yourself your monthly full body exam ( don't forget the groin, buttocks, feet, toes, etc). A-asymmetry, B-borders, C-color, D-diameter, E-elevation or evolving. If you see any of these changes please follow up in clinic. If you cannot see your back I recommend purchasing a hand held mirror to use with a larger wall mirror.       Checking for  Skin Cancer  You can find cancer early by checking your skin each month. There are 3 kinds of skin cancer. They are melanoma, basal cell carcinoma, and squamous cell carcinoma. Doing monthly skin checks is the best way to find new marks or skin changes. Follow the instructions below for checking your skin.   The ABCDEs of checking moles for melanoma   Check your moles or growths for signs of melanoma using ABCDE:   Asymmetry: the sides of the mole or growth don t match  Border: the edges are ragged, notched, or blurred  Color: the color within the mole or growth varies  Diameter: the mole or growth is larger than 6 mm (size of a pencil eraser)  Evolving: the size, shape, or color of the mole or growth is changing (evolving is not shown in the images below)    Checking for other types of skin cancer  Basal cell carcinoma or squamous cell carcinoma have symptoms such as:     A spot or mole that looks different from all other marks on your skin  Changes in how an area feels, such as itching, tenderness, or pain  Changes in the skin's surface, such as oozing, bleeding, or scaliness  A sore that does not heal  New swelling or redness beyond the border of a mole    Who s at risk?  Anyone can get skin cancer. But you are at greater risk if you have:   Fair skin, light-colored hair, or light-colored eyes  Many moles or abnormal moles on your skin  A history of sunburns from sunlight or tanning beds  A family history of skin cancer  A history of exposure to radiation or chemicals  A weakened immune system  If you have had skin cancer in the past, you are at risk for recurring skin cancer.   How to check your skin  Do your monthly skin checkups in front of a full-length mirror. Check all parts of your body, including your:   Head (ears, face, neck, and scalp)  Torso (front, back, and sides)  Arms (tops, undersides, upper, and lower armpits)  Hands (palms, backs, and fingers, including under the nails)  Buttocks and  genitals  Legs (front, back, and sides)  Feet (tops, soles, toes, including under the nails, and between toes)  If you have a lot of moles, take digital photos of them each month. Make sure to take photos both up close and from a distance. These can help you see if any moles change over time.   Most skin changes are not cancer. But if you see any changes in your skin, call your doctor right away. Only he or she can diagnose a problem. If you have skin cancer, seeing your doctor can be the first step toward getting the treatment that could save your life.   [employer] last reviewed this educational content on 4/1/2019 2000-2020 [employer]. [address] [place] [address], [place] [address]. All rights reserved. This information is not intended as a substitute for professional medical care. Always follow your healthcare professional's instructions.       When should I call my doctor?  If you are worsening or not improving, please, contact us or seek urgent care as noted below.     Who should I call with questions (adults)?  [facility] (adult and pediatric): [phone]  [facility] (adult): [phone]  [facility] ([facility] [place]) [phone]  For urgent needs outside of business hours call the [facility] at [phone] and ask for the dermatology resident on call to be paged  If this is a medical emergency and you are unable to reach an ER, Call [phone]      If you need a prescription refill, please contact your pharmacy. Refills are approved or denied by our Physicians during normal business hours, Monday through Fridays  Per office policy, refills will not be granted if you have not been seen within the past year (or sooner depending on your child's condition

## 2025-04-09 NOTE — LETTER
4/9/2025      Lev Cabral  [address] [place] [address]      Dear Colleague,    Thank you for referring your patient, Lev Cabral, to the [facility]. Please see a copy of my visit note below.    [facility] Dermatology Note  Encounter Date: Apr 9, 2025  Office Visit     Reviewed patients past medical history and pertinent chart review prior to patients visit today.     Dermatology Problem List:  # AK, s/p cryo  # Left inferior occipital scalp, consistent with prurigo nodularis, s/p bx 1/16/2018  -s/p cryo 4/9/2025    Personal Hx: Negative for personal history of skin cancer.   Family Hx: Negative for family history of skin cancer.  ___________________________________    Assessment & Plan:     # Actinic keratosis, right vertex scalp  Actinic keratoses are pre-cancerous skin growths caused by sun exposure. Treatment is recommended and medically indicated. Treated with cryotherapy as outlined below.     Procedures performed:   - Cryotherapy procedure note, location(s): See physical exam. After verbal consent and discussion of risks and benefits including, but not limited to, dyspigmentation/scar, blister, and pain, 1 lesion(s) was(were) treated with 1-2 mm freeze border for 1-2 cycles with liquid nitrogen. Post cryotherapy instructions were provided.     # Prurigo nodule, left inferior occipital scalp  -The benign nature of the skin lesion(s) was discussed with the patient. The patient requested treatment intervention due to finding the lesions to be bothersome. Discussed cryotherapy treatment with patient. Patient elects to pursue cryotherapy today. After verbal consent and discussion of risks and benefits including but no limited to dyspigmentation/scar, blister, and pain, 1 was(were) treated with 1-2mm freeze border for 2 cycles with liquid nitrogen. Post cryotherapy instructions were provided.     # Seborrheic keratoses  # Solar  lentigines  - Benign, no further treatment needed. Continue with observation at this time. If lesions become bothersome, patient return to clinic for reevaluation    # Multiple nevi  - No concerning features on dermoscopy. We discussed the importance of self exams at home.   - ABCDEs: Counseled ABCDEs of melanoma: Asymmetry, Border (irregularity), Color (not uniform, changes in color), Diameter (greater than 6 mm which is about the size of a pencil eraser), and Evolving (any changes in preexisting moles).  - Sun protection: Counseled SPF 30+ sunscreen, UPF clothing, sun avoidance, tanning bed avoidance.    Follow up: prn for new or changing lesions or new concerns    All risks, benefits and alternatives were discussed with patient.  Patient is in agreement and understands the assessment and plan.  All questions were answered.  Carrie Santiago PA-C  [facility] Dermatology  _______________________________________    CC: Derm Problem (Skin tags: upper left eyelid, under left eye, left shoulder and chest, right axilla/- lesion on back of head, was previously frozen and had shave biopsy, but it has grown back)     HPI:  Mr. Lev Cabral is a(n) 69 year old male who presents today as a return patient for evaluation of several skin lesions.  He was last seen in dermatology on 1/16/2018 at which time a biopsy of the left inferior occipital scalp was obtained and revealed prurigo nodularis.    Today, he has several new growths on his chest, right axilla, left supraclavicular area and left upper eyelid. These are asymptomatic and do not itch, bleed, or cause pain.  The lesion of the left upper eyelid has grown in size.  It has been present for roughly 30+ years.  He also has a lesion on his left inferior occipital scalp that he habitually picks at.  He previously had this biopsied on 1/16/2018 at which time it was consistent with prurigo nodularis.  It is asymptomatic today. He had it treated with cryotherapy in  the past and felt this was beneficial.    Patient is otherwise feeling well, without additional skin concerns.     Physical Exam:  SKIN: Focused examination of face, scalp, neck, back, chest, abdomen, and bilateral upper extremities only was performed per patient request.  - right vertex scalp, pink macule(s) with overlying adherent scale consistent with an actinic keratosis   - Back, chest, abdomen, right axilla, bilateral upper extremities, face, left supraclavicular area, and left upper eyelid, waxy, stuck on tan to brown papule   -left inferior occipital scalp, erythematous nodule with reassuring features  - trunk and upper extremities, brown to tan, macules and papules with reassuring features    - No other lesions of concern on areas examined.     Medications:  Current Outpatient Medications   Medication Sig Dispense Refill     losartan (COZAAR) 50 MG tablet Take 1 tablet (50 mg) by mouth daily. 90 tablet 2     psyllium (METAMUCIL) 58.6 % POWD Take 2 Tablespoonful by mouth daily       sildenafil (REVATIO) 20 MG tablet TAKE TWO TO FIVE TABLETS BY MOUTH 30 MINUTES TO 4 HOURS BEFORE SEX. 30 tablet 2     venlafaxine (EFFEXOR XR) 150 MG 24 hr capsule Take 1 capsule (150 mg) by mouth daily. 90 capsule 1     vitamin B-12 (CYANOCOBALAMIN) 2500 MCG sublingual tablet Take 1,000 mcg by mouth daily       vitamin C (ASCORBIC ACID) 1000 MG TABS Take 1,000 mg by mouth daily       VITAMIN D, CHOLECALCIFEROL, PO Take 1,000 Units by mouth daily       zinc gluconate 50 MG tablet Take 20 mg by mouth daily.       No current facility-administered medications for this visit.      Past Medical History:   Patient Active Problem List   Diagnosis     CARDIOVASCULAR SCREENING; LDL GOAL LESS THAN 130     Family history of diabetes mellitus     Cervicalgia     Acute reaction to stress     Peyronie's disease     Mass of chest wall, left     Chondrosarcoma of ribs (H)     Mild ascending aorta dilation     Acute post-operative pain     Past  Medical History:   Diagnosis Date     Hypertension      Unspecified adjustment reaction        CC Referred Self, MD  No address on file on close of this encounter.       Again, thank you for allowing me to participate in the care of your patient.        Sincerely,        Yokasta Santiago PA-C    Electronically signed

## 2025-04-09 NOTE — PROGRESS NOTES
[facility] Dermatology Note  Encounter Date: Apr 9, 2025  Office Visit     Reviewed patients past medical history and pertinent chart review prior to patients visit today.     Dermatology Problem List:  # AK, s/p cryo  # Left inferior occipital scalp, consistent with prurigo nodularis, s/p bx 1/16/2018  -s/p cryo 4/9/2025    Personal Hx: Negative for personal history of skin cancer.   Family Hx: Negative for family history of skin cancer.  ___________________________________    Assessment & Plan:     # Actinic keratosis, right vertex scalp  Actinic keratoses are pre-cancerous skin growths caused by sun exposure. Treatment is recommended and medically indicated. Treated with cryotherapy as outlined below.     Procedures performed:   - Cryotherapy procedure note, location(s): See physical exam. After verbal consent and discussion of risks and benefits including, but not limited to, dyspigmentation/scar, blister, and pain, 1 lesion(s) was(were) treated with 1-2 mm freeze border for 1-2 cycles with liquid nitrogen. Post cryotherapy instructions were provided.     # Prurigo nodule, left inferior occipital scalp  -The benign nature of the skin lesion(s) was discussed with the patient. The patient requested treatment intervention due to finding the lesions to be bothersome. Discussed cryotherapy treatment with patient. Patient elects to pursue cryotherapy today. After verbal consent and discussion of risks and benefits including but no limited to dyspigmentation/scar, blister, and pain, 1 was(were) treated with 1-2mm freeze border for 2 cycles with liquid nitrogen. Post cryotherapy instructions were provided.     # Seborrheic keratoses  # Solar lentigines  - Benign, no further treatment needed. Continue with observation at this time. If lesions become bothersome, patient return to clinic for reevaluation    # Multiple nevi  - No concerning features on dermoscopy. We discussed the importance of self  exams at home.   - ABCDEs: Counseled ABCDEs of melanoma: Asymmetry, Border (irregularity), Color (not uniform, changes in color), Diameter (greater than 6 mm which is about the size of a pencil eraser), and Evolving (any changes in preexisting moles).  - Sun protection: Counseled SPF 30+ sunscreen, UPF clothing, sun avoidance, tanning bed avoidance.    Follow up: prn for new or changing lesions or new concerns    All risks, benefits and alternatives were discussed with patient.  Patient is in agreement and understands the assessment and plan.  All questions were answered.  Carrie Santiago PA-C  [facility] Dermatology  _______________________________________    CC: Derm Problem (Skin tags: upper left eyelid, under left eye, left shoulder and chest, right axilla/- lesion on back of head, was previously frozen and had shave biopsy, but it has grown back)     HPI:  Mr. Lev Cabral is a(n) 69 year old male who presents today as a return patient for evaluation of several skin lesions.  He was last seen in dermatology on 1/16/2018 at which time a biopsy of the left inferior occipital scalp was obtained and revealed prurigo nodularis.    Today, he has several new growths on his chest, right axilla, left supraclavicular area and left upper eyelid. These are asymptomatic and do not itch, bleed, or cause pain.  The lesion of the left upper eyelid has grown in size.  It has been present for roughly 30+ years.  He also has a lesion on his left inferior occipital scalp that he habitually picks at.  He previously had this biopsied on 1/16/2018 at which time it was consistent with prurigo nodularis.  It is asymptomatic today. He had it treated with cryotherapy in the past and felt this was beneficial.    Patient is otherwise feeling well, without additional skin concerns.     Physical Exam:  SKIN: Focused examination of face, scalp, neck, back, chest, abdomen, and bilateral upper extremities only was performed per  patient request.  - right vertex scalp, pink macule(s) with overlying adherent scale consistent with an actinic keratosis   - Back, chest, abdomen, right axilla, bilateral upper extremities, face, left supraclavicular area, and left upper eyelid, waxy, stuck on tan to brown papule   -left inferior occipital scalp, erythematous nodule with reassuring features  - trunk and upper extremities, brown to tan, macules and papules with reassuring features    - No other lesions of concern on areas examined.     Medications:  Current Outpatient Medications   Medication Sig Dispense Refill    losartan (COZAAR) 50 MG tablet Take 1 tablet (50 mg) by mouth daily. 90 tablet 2    psyllium (METAMUCIL) 58.6 % POWD Take 2 Tablespoonful by mouth daily      sildenafil (REVATIO) 20 MG tablet TAKE TWO TO FIVE TABLETS BY MOUTH 30 MINUTES TO 4 HOURS BEFORE SEX. 30 tablet 2    venlafaxine (EFFEXOR XR) 150 MG 24 hr capsule Take 1 capsule (150 mg) by mouth daily. 90 capsule 1    vitamin B-12 (CYANOCOBALAMIN) 2500 MCG sublingual tablet Take 1,000 mcg by mouth daily      vitamin C (ASCORBIC ACID) 1000 MG TABS Take 1,000 mg by mouth daily      VITAMIN D, CHOLECALCIFEROL, PO Take 1,000 Units by mouth daily      zinc gluconate 50 MG tablet Take 20 mg by mouth daily.       No current facility-administered medications for this visit.      Past Medical History:   Patient Active Problem List   Diagnosis    CARDIOVASCULAR SCREENING; LDL GOAL LESS THAN 130    Family history of diabetes mellitus    Cervicalgia    Acute reaction to stress    Peyronie's disease    Mass of chest wall, left    Chondrosarcoma of ribs (H)    Mild ascending aorta dilation    Acute post-operative pain     Past Medical History:   Diagnosis Date    Hypertension     Unspecified adjustment reaction        CC Referred Self, MD  No address on file on close of this encounter.

## 2025-05-23 PROBLEM — D12.6 COLON ADENOMAS: Status: ACTIVE | Noted: 2025-05-23

## 2025-05-28 ENCOUNTER — PATIENT OUTREACH (OUTPATIENT)
Dept: GASTROENTEROLOGY | Facility: CLINIC | Age: 70
End: 2025-05-28
Payer: MEDICARE

## 2025-06-09 ENCOUNTER — VIRTUAL VISIT (OUTPATIENT)
Dept: FAMILY MEDICINE | Facility: CLINIC | Age: 70
End: 2025-06-09
Payer: MEDICARE

## 2025-06-09 DIAGNOSIS — M54.50 ACUTE LEFT-SIDED LOW BACK PAIN WITHOUT SCIATICA: Primary | ICD-10-CM

## 2025-06-09 PROCEDURE — 98014 SYNCH AUDIO-ONLY EST MOD 30: CPT | Performed by: FAMILY MEDICINE

## 2025-06-09 RX ORDER — CYCLOBENZAPRINE HCL 10 MG
10 TABLET ORAL 3 TIMES DAILY PRN
Qty: 30 TABLET | Refills: 1 | Status: SHIPPED | OUTPATIENT
Start: 2025-06-09

## 2025-06-09 RX ORDER — DICLOFENAC SODIUM 75 MG/1
75 TABLET, DELAYED RELEASE ORAL 2 TIMES DAILY
Qty: 60 TABLET | Refills: 0 | Status: SHIPPED | OUTPATIENT
Start: 2025-06-09

## 2025-06-09 ASSESSMENT — ENCOUNTER SYMPTOMS: BACK PAIN: 1

## 2025-06-09 NOTE — PROGRESS NOTES
Alejo is a 69 year old who is being evaluated via a billable telephone visit.    What phone number would you like to be contacted at? [phone]  How would you like to obtain your AVS? Shohart  Originating Location (pt. Location): Home    Distant Location (provider location):  Off-site  Telephone visit completed due to the patient did not consent to a video visit.    Assessment and Plan    (M54.50) Acute left-sided low back pain without sciatica  (primary encounter diagnosis)  Comment: also advised to use 1000mg acetaminophen TID.  Can follow up with his spine team if not helpfing  Plan: cyclobenzaprine (FLEXERIL) 10 MG tablet,         diclofenac (VOLTAREN) 75 MG EC tablet              RTC in 2w PRN    Louis Duran MD     Subjective   Alejo is a 69 year old, presenting for the following health issues:  Back Pain      6/9/2025     4:04 PM   Additional Questions   Roomed by adonis ward   Accompanied by self         6/9/2025     4:04 PM   Patient Reported Additional Medications   Patient reports taking the following new medications na     Back Pain     History of Present Illness       Back Pain:  He presents for follow up of back pain. Patient's back pain is a new problem.    Original cause of back pain: other  First noticed back pain: 1-4 weeks ago  Patient feels back pain: constantlyLocation of back pain:  Left lower back and left side of waist  Description of back pain: cramping  Back pain spreads: nowhere    Since patient first noticed back pain, pain is: always present, but gets better and worse  Does back pain interfere with his job:  No  On a scale of 1-10 (10 being the worst), patient describes pain as:  5  What makes back pain worse: lying down and standing   Acupuncture: not tried  Acetaminophen: not tried  Activity or exercise: not tried  Chiropractor:  Not tried  Cold: not helpful  Heat: not helpful  Massage: not tried  Muscle relaxants: not tried  NSAIDS: not helpful  Opioids: not tried  Physical Therapy: not  tried  Rest: not helpful  Steroid Injection: not tried  Stretching: not helpful  Surgery: not tried  TENS unit: not tried  Topical pain relievers: not helpful  Other healthcare providers patient is seeing for back pain: None   He is taking medications regularly.        Complex history of neck and back trouble.  Had c-spine surgery, really should have L-spine surgery.  Was planting trees about 2 weeks ago, digging lots of holes.  Have since had L lower back pain.  L flank spasm. Hurts to stand upright, wakes often during the night and sleep has been poor. Sitting not too bad.  Has tried heat (hot shower seems to help), topical Icy Hot not helpful.  Using 400-600 ibuprofen helps a bit.  No pain into leg, no numbness/tingling/weakness into leg.    Has done a lot of back PT over the years and would prefer to wait before trying    Follows with back surgery at [place]; they did his neck surgery.      Objective           Vitals:  No vitals were obtained today due to virtual visit.    Physical Exam   General: Alert and no distress //Respiratory: No audible wheeze, cough, or shortness of breath // Psychiatric:  Appropriate affect, tone, and pace of words            Phone call duration: 15 minutes  Signed Electronically by: Louis Duran MD

## 2025-06-10 ENCOUNTER — TELEPHONE (OUTPATIENT)
Dept: FAMILY MEDICINE | Facility: CLINIC | Age: 70
End: 2025-06-10
Payer: MEDICARE

## 2025-06-10 NOTE — TELEPHONE ENCOUNTER
Prior Authorization Retail Medication Request    Medication/Dose: cyclobenzaprine (FLEXERIL) 10 MG tablet   Diagnosis and ICD code (if different than what is on RX):    New/renewal/insurance change PA/secondary ins. PA:  Previously Tried and Failed:    Rationale:      Insurance   Primary: MEDICARE  Insurance ID:  [id number]     Secondary (if applicable):MEDICARE SUPPLEMENT  Insurance ID:  [id number]     Pharmacy Information (if different than what is on RX)     [id number] - [place], [place] - [address] Information  Preferred routing pool for dept communication: GENESIS BAILEY NURSE POOL - PRIMARY CARE

## 2025-06-11 NOTE — TELEPHONE ENCOUNTER
PA Initiation    Medication: CYCLOBENZAPRINE HCL 10 MG PO TABS  Insurance Company: [employer] - Phone [phone] Fax [phone]  Pharmacy Filling the Rx: [facility][id number] - [place], [place] - [address] [place] [address]  Filling Pharmacy Phone:    Filling Pharmacy Fax: [phone]  Start Date: 6/11/2025

## 2025-06-29 DIAGNOSIS — N52.9 ERECTILE DYSFUNCTION, UNSPECIFIED ERECTILE DYSFUNCTION TYPE: ICD-10-CM

## 2025-06-30 RX ORDER — SILDENAFIL CITRATE 20 MG/1
TABLET ORAL
Qty: 30 TABLET | Refills: 2 | Status: SHIPPED | OUTPATIENT
Start: 2025-06-30

## 2025-07-07 ENCOUNTER — VIRTUAL VISIT (OUTPATIENT)
Dept: FAMILY MEDICINE | Facility: CLINIC | Age: 70
End: 2025-07-07
Payer: MEDICARE

## 2025-07-07 DIAGNOSIS — C41.3 CHONDROSARCOMA OF RIBS (H): ICD-10-CM

## 2025-07-07 DIAGNOSIS — F43.23 ADJUSTMENT DISORDER WITH MIXED ANXIETY AND DEPRESSED MOOD: Primary | ICD-10-CM

## 2025-07-07 PROCEDURE — 98005 SYNCH AUDIO-VIDEO EST LOW 20: CPT | Performed by: PHYSICIAN ASSISTANT

## 2025-07-07 RX ORDER — VENLAFAXINE HYDROCHLORIDE 37.5 MG/1
CAPSULE, EXTENDED RELEASE ORAL
Qty: 89 CAPSULE | Refills: 0 | Status: SHIPPED | OUTPATIENT
Start: 2025-07-07 | End: 2025-08-28

## 2025-07-07 NOTE — PROGRESS NOTES
Alejo is a 69 year old who is being evaluated via a billable video visit.          Assessment & Plan     Adjustment disorder with mixed anxiety and depressed mood  Alejo's moods are so-so with consistent negative feelings regarding all of his medical issues that have worsened over the past few years. Additionally he mentions symptoms when missing his SNRI for even a day or two. He'd like to taper off. We'll move slow as he's been on the rx for 2 years now. He'll monitor symptoms closely and we should consider trial of alternative rx if depression worsens while off.   - venlafaxine (EFFEXOR XR) 37.5 MG 24 hr capsule; Take 3 capsules (112.5 mg) by mouth daily for 14 days, THEN 2 capsules (75 mg) daily for 14 days, THEN 1 capsule (37.5 mg) daily for 14 days, THEN 1 capsule (37.5 mg) every other day for 10 days.    Chondrosarcoma of ribs (H)  Follows with Dr. Sterling. Stable this past January    The longitudinal plan of care for the diagnosis(es)/condition(s) as documented were addressed during this visit. Due to the added complexity in care, I will continue to support Alejo in the subsequent management and with ongoing continuity of care.        BMI  Estimated body mass index is 30.92 kg/m  as calculated from the following:    Height as of 2/27/25: 1.829 m (6').    Weight as of 2/27/25: 103.4 kg (228 lb).           Subjective   Alejo is a 69 year old, presenting for the following health issues:  Recheck Medication      7/7/2025     3:16 PM   Additional Questions   Roomed by AA     Video Start Time: 3:31 PM    History of Present Illness       Mental Health Follow-up:  Patient presents to follow-up on Depression.Patient's depression since last visit has been:  Medium  The patient is not having other symptoms associated with depression.      Any significant life events: No and health concerns  Patient is not feeling anxious or having panic attacks.  Patient has no concerns about alcohol or drug use.    He eats 0-1 servings of  "fruits and vegetables daily.He consumes 0 sweetened beverage(s) daily.He exercises with enough effort to increase his heart rate 9 or less minutes per day.  He exercises with enough effort to increase his heart rate 3 or less days per week.   He is taking medications regularly.      Lev Cabral is a 69 year old male who presents today for medication discussion  Specifically he is hoping to taper down/off of venlafaxine, partly 2/2 side effects if missing a dose  Has been taking now around 2 years  His moods are better than when he started but still \"not very good\" due to all of his health concerns    -he continues to feel low about this need for chronic medical apts  Additionally mentions if he misses his venlafaxine he is extremely symptomatic   -big headache, occasionally dizzy, feeling sluggish          Review of Systems  Constitutional, HEENT, cardiovascular, pulmonary, gi and gu systems are negative, except as otherwise noted.      Objective           Vitals:  No vitals were obtained today due to virtual visit.    Physical Exam   GENERAL: alert and no distress  EYES: Eyes grossly normal to inspection.  No discharge or erythema, or obvious scleral/conjunctival abnormalities.  RESP: No audible wheeze, cough, or visible cyanosis.    SKIN: Visible skin clear. No significant rash, abnormal pigmentation or lesions.  NEURO: Cranial nerves grossly intact.  Mentation and speech appropriate for age.  PSYCH: Appropriate affect, tone, and pace of words          Video-Visit Details    Type of service:  Video Visit   Video End Time:3:59 PM  Originating Location (pt. Location): Home    Distant Location (provider location):  On-site  Platform used for Video Visit: Maeve  Signed Electronically by: Cr Corley PA-C    "

## 2025-07-26 ENCOUNTER — MYC REFILL (OUTPATIENT)
Dept: FAMILY MEDICINE | Facility: CLINIC | Age: 70
End: 2025-07-26
Payer: MEDICARE

## 2025-07-26 DIAGNOSIS — I10 ESSENTIAL HYPERTENSION: ICD-10-CM

## 2025-07-28 RX ORDER — LOSARTAN POTASSIUM 50 MG/1
50 TABLET ORAL DAILY
Qty: 90 TABLET | Refills: 2 | OUTPATIENT
Start: 2025-07-28

## (undated) DEVICE — KIT ENDO TURNOVER/PROCEDURE W/CLEAN A SCOPE LINERS 103888

## (undated) DEVICE — ESU GROUND PAD UNIVERSAL W/O CORD

## (undated) DEVICE — SURGICEL HEMOSTAT 3X4" NUKNIT 1943

## (undated) DEVICE — SPONGE LAP 18X18" X8435

## (undated) DEVICE — SU PROLENE 3-0 V-7DA 36" 8976H

## (undated) DEVICE — ENDO TRAP POLYP QUICK CATCH 710201

## (undated) DEVICE — KIT ENDO TURNOVER/PROCEDURE CARRY-ON 101822

## (undated) DEVICE — PREP CHLORAPREP 26ML TINTED ORANGE  260815

## (undated) DEVICE — DRAIN PENROSE 0.75"X18" LATEX FREE GR205

## (undated) DEVICE — STPL SKIN SUBCUTICULAR INSORB  2030

## (undated) DEVICE — SU VICRYL 2-0 CT-1 27" J339H

## (undated) DEVICE — DRSG KERLIX FLUFFS X5

## (undated) DEVICE — SU NDL CUT REV MED 3/8 209014

## (undated) DEVICE — ENDO SNARE EXACTO COLD 9MM LOOP 2.4MMX230CM 00711115

## (undated) DEVICE — GLOVE PROTEXIS W/NEU-THERA 7.5  2D73TE75

## (undated) DEVICE — DRSG GAUZE 4X4" 3033

## (undated) DEVICE — SU VICRYL 1 CT 36" J959H

## (undated) DEVICE — DRSG KERLIX 4 1/2"X4YDS ROLL 6715

## (undated) DEVICE — SU PROLENE 4-0 RB-1DA 36" 8557H

## (undated) DEVICE — SUCTION DRY CHEST DRAIN OASIS 3600-100

## (undated) DEVICE — PACK MINOR SBA15MIFSE

## (undated) DEVICE — TUBING SUCTION 12"X1/4" N612

## (undated) DEVICE — SYR BULB IRRIG 50ML LATEX FREE 0035280

## (undated) DEVICE — SOL NACL 0.9% IRRIG 1000ML BOTTLE 07138-09

## (undated) DEVICE — SU PROLENE 1 CTX 30" 8435H

## (undated) DEVICE — LINEN TOWEL PACK X5 5464

## (undated) DEVICE — BLADE CLIPPER 4406

## (undated) DEVICE — GOWN IMPERVIOUS ZONED LG

## (undated) DEVICE — GLOVE PROTEXIS W/NEU-THERA 6.5  2D73TE65

## (undated) DEVICE — CLIP HEMOSTASIS ASSURANCE W16 MM BX00711884

## (undated) DEVICE — NDL 22GA 1.5"

## (undated) DEVICE — TAPE DRSG UNIVERSAL CLOTH 3" WHITE LATEX 881-3

## (undated) DEVICE — SU SILK 2-0 TIE 24" SA75H

## (undated) DEVICE — DRSG STERI STRIP 1/2X4" R1547

## (undated) DEVICE — CONNECTOR BLAKE DRAIN SGL BCC1

## (undated) DEVICE — Device

## (undated) DEVICE — BLADE KNIFE SURG 10 371110

## (undated) DEVICE — SUCTION CANISTER MEDIVAC LINER 3000ML W/LID 65651-530

## (undated) DEVICE — SU SILK 2 REEL 60" SA8H

## (undated) DEVICE — SU VICRYL 1 CTX CR 8X18" J765D

## (undated) DEVICE — TUBING SUCTION MEDI-VAC SOFT 3/16"X20' N520A

## (undated) DEVICE — CATH ON-Q PAIN SILVER SKR 2.5" PM010-A

## (undated) DEVICE — BLADE KNIFE SURG 15 371115

## (undated) DEVICE — DRAPE IOBAN INCISE 23X17" 6650EZ

## (undated) DEVICE — DRAPE BREAST/CHEST 29420

## (undated) DEVICE — CLIP APPLIER 11.5" PREMIUM II 134053

## (undated) DEVICE — ESU ELEC BLADE 6" COATED/INSULATED E1455-6

## (undated) DEVICE — DRAPE POUCH INSTRUMENT 1018

## (undated) RX ORDER — GLYCOPYRROLATE 0.2 MG/ML
INJECTION, SOLUTION INTRAMUSCULAR; INTRAVENOUS
Status: DISPENSED
Start: 2017-12-01

## (undated) RX ORDER — FENTANYL CITRATE 50 UG/ML
INJECTION, SOLUTION INTRAMUSCULAR; INTRAVENOUS
Status: DISPENSED
Start: 2017-12-01

## (undated) RX ORDER — NEOSTIGMINE METHYLSULFATE 1 MG/ML
VIAL (ML) INJECTION
Status: DISPENSED
Start: 2017-12-01

## (undated) RX ORDER — HYDROMORPHONE HYDROCHLORIDE 1 MG/ML
INJECTION, SOLUTION INTRAMUSCULAR; INTRAVENOUS; SUBCUTANEOUS
Status: DISPENSED
Start: 2017-12-01

## (undated) RX ORDER — KETOROLAC TROMETHAMINE 30 MG/ML
INJECTION, SOLUTION INTRAMUSCULAR; INTRAVENOUS
Status: DISPENSED
Start: 2017-12-01

## (undated) RX ORDER — LIDOCAINE HYDROCHLORIDE 20 MG/ML
INJECTION, SOLUTION EPIDURAL; INFILTRATION; INTRACAUDAL; PERINEURAL
Status: DISPENSED
Start: 2017-12-01

## (undated) RX ORDER — FENTANYL CITRATE 50 UG/ML
INJECTION, SOLUTION INTRAMUSCULAR; INTRAVENOUS
Status: DISPENSED
Start: 2024-02-22

## (undated) RX ORDER — FENTANYL CITRATE 50 UG/ML
INJECTION, SOLUTION INTRAMUSCULAR; INTRAVENOUS
Status: DISPENSED
Start: 2017-02-09

## (undated) RX ORDER — BUPIVACAINE HYDROCHLORIDE 5 MG/ML
INJECTION, SOLUTION EPIDURAL; INTRACAUDAL
Status: DISPENSED
Start: 2017-12-01

## (undated) RX ORDER — ONDANSETRON 2 MG/ML
INJECTION INTRAMUSCULAR; INTRAVENOUS
Status: DISPENSED
Start: 2017-12-01

## (undated) RX ORDER — ONDANSETRON 2 MG/ML
INJECTION INTRAMUSCULAR; INTRAVENOUS
Status: DISPENSED
Start: 2024-01-25

## (undated) RX ORDER — DEXAMETHASONE SODIUM PHOSPHATE 4 MG/ML
INJECTION, SOLUTION INTRA-ARTICULAR; INTRALESIONAL; INTRAMUSCULAR; INTRAVENOUS; SOFT TISSUE
Status: DISPENSED
Start: 2017-12-01

## (undated) RX ORDER — DIPHENHYDRAMINE HYDROCHLORIDE 50 MG/ML
INJECTION INTRAMUSCULAR; INTRAVENOUS
Status: DISPENSED
Start: 2017-12-01